# Patient Record
Sex: FEMALE | Race: WHITE | NOT HISPANIC OR LATINO | Employment: OTHER | URBAN - METROPOLITAN AREA
[De-identification: names, ages, dates, MRNs, and addresses within clinical notes are randomized per-mention and may not be internally consistent; named-entity substitution may affect disease eponyms.]

---

## 2018-07-11 ENCOUNTER — HOSPITAL ENCOUNTER (EMERGENCY)
Facility: HOSPITAL | Age: 83
Discharge: HOME | End: 2018-07-11
Attending: EMERGENCY MEDICINE | Admitting: EMERGENCY MEDICINE
Payer: MEDICARE

## 2018-07-11 VITALS
OXYGEN SATURATION: 95 % | BODY MASS INDEX: 23.3 KG/M2 | DIASTOLIC BLOOD PRESSURE: 78 MMHG | WEIGHT: 145 LBS | HEART RATE: 71 BPM | SYSTOLIC BLOOD PRESSURE: 184 MMHG | RESPIRATION RATE: 18 BRPM | HEIGHT: 66 IN | TEMPERATURE: 98 F

## 2018-07-11 DIAGNOSIS — T07.XXXA ABRASIONS OF MULTIPLE SITES: Primary | ICD-10-CM

## 2018-07-11 PROCEDURE — 99284 EMERGENCY DEPT VISIT MOD MDM: CPT

## 2018-07-11 PROCEDURE — 90715 TDAP VACCINE 7 YRS/> IM: CPT | Performed by: EMERGENCY MEDICINE

## 2018-07-11 PROCEDURE — 90471 IMMUNIZATION ADMIN: CPT

## 2018-07-11 RX ORDER — LEVOTHYROXINE SODIUM 0.07 MG/1
75 TABLET ORAL DAILY
COMMUNITY

## 2018-07-11 RX ORDER — FUROSEMIDE 40 MG/1
40 TABLET ORAL DAILY
COMMUNITY

## 2018-07-11 RX ORDER — ASPIRIN 81 MG/1
81 TABLET ORAL DAILY
COMMUNITY

## 2018-07-11 RX ORDER — LISINOPRIL 5 MG/1
5 TABLET ORAL DAILY
COMMUNITY

## 2018-07-11 RX ADMIN — TETANUS TOXOID, REDUCED DIPHTHERIA TOXOID AND ACELLULAR PERTUSSIS VACCINE, ADSORBED 0.5 ML: 5; 2.5; 8; 8; 2.5 SUSPENSION INTRAMUSCULAR at 21:26

## 2018-07-11 NOTE — ED TRIAGE NOTES
Pt arrives to ED covered in urine and dirty clothes  Stating that she would like her cat bite from 4 days ago looked at  Per EMS, pts Son checked on her today after a period of time and found her unkempt appearing to not be able to take care of herself  Pt denies needing any help at home  Pt states uses at wheelchair at home and couldn't get up to go to the bathroom in time because of her lasix  Pt oriented to person and place but needs prompting of the time  Pt also admits that she is falling more at home

## 2018-07-12 NOTE — ED NOTES
Pt cleaned and changed into paper scrubs  Pt refusing any testing, requesting only a tetanus shot  Physician at bedside to speak to family        Panchito Henriquez RN  07/11/18 5595

## 2018-07-12 NOTE — ED PROVIDER NOTES
History  Chief Complaint   Patient presents with    Cat Bite    Failure To Thrive     87 YOF WITH REPORTED CAT BITE TO RIGHT ARM 4 DAYS AGO  HER SON HAD VISITED THE PATIENT TODAY AND NOTICE THE RIGHT FOREARM WOUNDS  NO REPORTED ADDITIONAL TRAUMA  THE PATIENT IS IN A WC AND DOES MOST OF HER HOUSEHOLD ACTIVITIES FROM THE WC  SHE DECLINES HELP AOR HOSPITALIZATION            Prior to Admission Medications   Prescriptions Last Dose Informant Patient Reported? Taking?   aspirin (ECOTRIN LOW STRENGTH) 81 mg EC tablet Unknown at Unknown time  Yes No   Sig: Take 81 mg by mouth daily   furosemide (LASIX) 40 mg tablet   Yes Yes   Sig: Take 40 mg by mouth daily   levothyroxine 75 mcg tablet Unknown at Unknown time  Yes No   Sig: Take 75 mcg by mouth daily   lisinopril (ZESTRIL) 5 mg tablet Unknown at Unknown time  Yes No   Sig: Take 5 mg by mouth daily      Facility-Administered Medications: None       Past Medical History:   Diagnosis Date    CHF (congestive heart failure) (HCC)     Disease of thyroid gland     Hypertension     MI, old        Past Surgical History:   Procedure Laterality Date    APPENDECTOMY      CAROTID ARTERY - SUBCLAVIAN ARTERY BYPASS GRAFT      HYSTERECTOMY      TONSILLECTOMY         History reviewed  No pertinent family history  I have reviewed and agree with the history as documented  Social History   Substance Use Topics    Smoking status: Former Smoker     Types: Cigarettes     Quit date: 1985    Smokeless tobacco: Never Used    Alcohol use No        Review of Systems   Constitutional: Negative for chills and fever  HENT: Negative for ear pain, rhinorrhea and sore throat  Eyes: Negative for pain, redness and visual disturbance  Respiratory: Negative for cough and shortness of breath  Cardiovascular: Negative for chest pain and leg swelling  Gastrointestinal: Negative for abdominal pain, diarrhea, nausea and vomiting     Genitourinary: Negative for dysuria, flank pain, frequency and urgency  Musculoskeletal: Negative for back pain, myalgias and neck pain  Skin: Negative for rash  Neurological: Negative for dizziness, weakness, light-headedness and headaches  Hematological: Negative  Psychiatric/Behavioral: Negative for agitation, confusion and suicidal ideas  The patient is not nervous/anxious  All other systems reviewed and are negative  Physical Exam  Physical Exam   Constitutional: She is oriented to person, place, and time  She appears well-developed and well-nourished  HENT:   Nose: Nose normal    Mouth/Throat: Oropharynx is clear and moist  No oropharyngeal exudate  Eyes: Conjunctivae and EOM are normal  Pupils are equal, round, and reactive to light  No scleral icterus  Neck: Normal range of motion  Neck supple  No JVD present  No tracheal deviation present  Cardiovascular: Normal rate, regular rhythm and normal heart sounds  No murmur heard  Pulmonary/Chest: Effort normal and breath sounds normal  No respiratory distress  She has no wheezes  She has no rales  Abdominal: Soft  Bowel sounds are normal  There is no tenderness  There is no guarding  Musculoskeletal: Normal range of motion  She exhibits no edema or tenderness  Neurological: She is alert and oriented to person, place, and time  No cranial nerve deficit or sensory deficit  She exhibits normal muscle tone  5/5 motor, nl sens   Skin: Skin is warm and dry  THERE ARE SCATTERED SMALL LINEAR CRUSTED LESIONS TO THE RIGHT DORSAL FOREARM  SCATTERED ECCHYMOTIC PATCHES  NO DC OR OOZE  NO SEPSIS OR CELLULITIS   Psychiatric: She has a normal mood and affect  Her behavior is normal    Nursing note and vitals reviewed        Vital Signs  ED Triage Vitals [07/11/18 1912]   Temperature Pulse Respirations Blood Pressure SpO2   98 1 °F (36 7 °C) 69 18 (!) 197/114 96 %      Temp Source Heart Rate Source Patient Position - Orthostatic VS BP Location FiO2 (%)   Temporal Monitor Lying Left arm --      Pain Score       No Pain           Vitals:    07/11/18 1912 07/11/18 1930   BP: (!) 197/114 145/97   Pulse: 69 70   Patient Position - Orthostatic VS: Lying        Visual Acuity      ED Medications  Medications - No data to display    Diagnostic Studies  Results Reviewed     None                 No orders to display              Procedures  Procedures       Phone Contacts  ED Phone Contact    ED Course                               MDM  CritCare Time    Disposition  Final diagnoses:   None     ED Disposition     None      Follow-up Information    None         Patient's Medications   Discharge Prescriptions    No medications on file     No discharge procedures on file      ED Provider  Electronically Signed by           Savita Blackwood MD  07/11/18 9965

## 2018-07-12 NOTE — DISCHARGE INSTRUCTIONS
Abrasion   WHAT YOU NEED TO KNOW:   An abrasion is a scrape on your skin  It happens when your skin rubs against a rough surface  Some examples of an abrasion include rug burn, a skinned elbow, or road rash  Abrasions can be many shapes and sizes  The wound may hurt, bleed, bruise, or swell  DISCHARGE INSTRUCTIONS:   Return to the emergency department if:   · The bleeding does not stop after 10 minutes of firm pressure  · You cannot rinse one or more foreign objects out of your wound  · You have red streaks on your skin coming from your wound  Contact your healthcare provider if:   · You have a fever or chills  · Your abrasion is red, warm, swollen, or draining pus  · You have questions or concerns about your condition or care  Care for your abrasion:   · Wash your hands and dry them with a clean towel  · Press a clean cloth against your wound to stop any bleeding  · Rinse your wound with a lot of clean water  Do not use harsh soap, alcohol, or iodine solutions  · Use a clean, wet cloth to remove any objects, such as small pieces of rocks or dirt  · Rub antibiotic ointment on your wound  This may help prevent infection and help your wound heal     · Cover the wound with a non-stick bandage  Change the bandage daily, and if gets wet or dirty  Follow up with your healthcare provider as directed:  Write down your questions so you remember to ask them during your visits  © 2017 [employer] Information is for End User's use only and may not be sold, redistributed or otherwise used for commercial purposes  All illustrations and images included in CareNotes® are the copyrighted property of [employer] , [employer]  or [employer]  The above information is an  only  It is not intended as medical advice for individual conditions or treatments   Talk to your doctor, nurse or pharmacist before following any medical regimen to see if it is safe and effective for you

## 2018-08-21 ENCOUNTER — HOSPITAL ENCOUNTER (EMERGENCY)
Facility: HOSPITAL | Age: 83
Discharge: HOME | End: 2018-08-21
Attending: EMERGENCY MEDICINE | Admitting: EMERGENCY MEDICINE
Payer: MEDICARE

## 2018-08-21 VITALS
OXYGEN SATURATION: 99 % | SYSTOLIC BLOOD PRESSURE: 199 MMHG | DIASTOLIC BLOOD PRESSURE: 74 MMHG | BODY MASS INDEX: 23.3 KG/M2 | RESPIRATION RATE: 16 BRPM | TEMPERATURE: 97.1 F | WEIGHT: 145 LBS | HEART RATE: 78 BPM | HEIGHT: 66 IN

## 2018-08-21 DIAGNOSIS — I10 HYPERTENSION: ICD-10-CM

## 2018-08-21 DIAGNOSIS — R60.0 PEDAL EDEMA: ICD-10-CM

## 2018-08-21 DIAGNOSIS — R50.9 FEVER: ICD-10-CM

## 2018-08-21 DIAGNOSIS — N81.10 BLADDER PROLAPSE, FEMALE, ACQUIRED: ICD-10-CM

## 2018-08-21 DIAGNOSIS — L97.909 VENOUS STASIS ULCER (HCC): ICD-10-CM

## 2018-08-21 DIAGNOSIS — I83.009 VENOUS STASIS ULCER (HCC): ICD-10-CM

## 2018-08-21 DIAGNOSIS — N28.9 RENAL INSUFFICIENCY: ICD-10-CM

## 2018-08-21 DIAGNOSIS — N39.0 UTI (URINARY TRACT INFECTION): Primary | ICD-10-CM

## 2018-08-21 LAB
ALBUMIN SERPL BCP-MCNC: 4.1 G/DL (ref 3.5–5.7)
ALP SERPL-CCNC: 157 U/L (ref 55–165)
ALT SERPL W P-5'-P-CCNC: 8 U/L (ref 7–52)
ANION GAP SERPL CALCULATED.3IONS-SCNC: 9 MMOL/L (ref 4–13)
APTT PPP: 36 SECONDS (ref 24–36)
AST SERPL W P-5'-P-CCNC: 14 U/L (ref 13–39)
BACTERIA UR QL AUTO: ABNORMAL /HPF
BASOPHILS # BLD AUTO: 0.1 THOUSANDS/ΜL (ref 0–0.1)
BASOPHILS NFR BLD AUTO: 1 % (ref 0–1)
BILIRUB SERPL-MCNC: 0.5 MG/DL (ref 0.2–1)
BILIRUB UR QL STRIP: NEGATIVE
BUN SERPL-MCNC: 31 MG/DL (ref 7–25)
CALCIUM SERPL-MCNC: 9.8 MG/DL (ref 8.6–10.5)
CHLORIDE SERPL-SCNC: 102 MMOL/L (ref 98–107)
CLARITY UR: ABNORMAL
CO2 SERPL-SCNC: 28 MMOL/L (ref 21–31)
COLOR UR: YELLOW
CREAT SERPL-MCNC: 1.25 MG/DL (ref 0.6–1.2)
EOSINOPHIL # BLD AUTO: 0.2 THOUSAND/ΜL (ref 0–0.61)
EOSINOPHIL NFR BLD AUTO: 3 % (ref 0–6)
ERYTHROCYTE [DISTWIDTH] IN BLOOD BY AUTOMATED COUNT: 16.3 % (ref 11.6–15.1)
GFR SERPL CREATININE-BSD FRML MDRD: 39 ML/MIN/1.73SQ M
GLUCOSE SERPL-MCNC: 92 MG/DL (ref 65–140)
GLUCOSE UR STRIP-MCNC: NEGATIVE MG/DL
HCT VFR BLD AUTO: 38.1 % (ref 37–47)
HGB BLD-MCNC: 12.3 G/DL (ref 11.5–15.4)
HGB UR QL STRIP.AUTO: ABNORMAL
INR PPP: 1.09 (ref 0.9–1.5)
KETONES UR STRIP-MCNC: NEGATIVE MG/DL
LACTATE SERPL-SCNC: 1.2 MMOL/L (ref 0.5–2)
LEUKOCYTE ESTERASE UR QL STRIP: ABNORMAL
LYMPHOCYTES # BLD AUTO: 2 THOUSANDS/ΜL (ref 0.6–4.47)
LYMPHOCYTES NFR BLD AUTO: 31 % (ref 14–44)
MCH RBC QN AUTO: 28.7 PG (ref 26.8–34.3)
MCHC RBC AUTO-ENTMCNC: 32.4 G/DL (ref 31.4–37.4)
MCV RBC AUTO: 89 FL (ref 82–98)
MONOCYTES # BLD AUTO: 0.7 THOUSAND/ΜL (ref 0.17–1.22)
MONOCYTES NFR BLD AUTO: 11 % (ref 4–12)
NEUTROPHILS # BLD AUTO: 3.6 THOUSANDS/ΜL (ref 1.85–7.62)
NEUTS SEG NFR BLD AUTO: 55 % (ref 43–75)
NITRITE UR QL STRIP: POSITIVE
NON-SQ EPI CELLS URNS QL MICRO: ABNORMAL /HPF
NRBC BLD AUTO-RTO: 0 /100 WBCS
PH UR STRIP.AUTO: 7 [PH] (ref 5–8)
PLATELET # BLD AUTO: 336 THOUSANDS/UL (ref 149–390)
PMV BLD AUTO: 7.9 FL (ref 8.9–12.7)
POTASSIUM SERPL-SCNC: 4.7 MMOL/L (ref 3.5–5.5)
PROT SERPL-MCNC: 7.6 G/DL (ref 6.4–8.9)
PROT UR STRIP-MCNC: ABNORMAL MG/DL
PROTHROMBIN TIME: 12.5 SECONDS (ref 10.1–12.9)
RBC # BLD AUTO: 4.3 MILLION/UL (ref 3.81–5.12)
RBC #/AREA URNS AUTO: ABNORMAL /HPF
SODIUM SERPL-SCNC: 139 MMOL/L (ref 134–143)
SP GR UR STRIP.AUTO: 1.01 (ref 1–1.03)
UROBILINOGEN UR QL STRIP.AUTO: 0.2 E.U./DL
WBC # BLD AUTO: 6.6 THOUSAND/UL (ref 4.31–10.16)
WBC #/AREA URNS AUTO: ABNORMAL /HPF

## 2018-08-21 PROCEDURE — 85730 THROMBOPLASTIN TIME PARTIAL: CPT | Performed by: EMERGENCY MEDICINE

## 2018-08-21 PROCEDURE — 87077 CULTURE AEROBIC IDENTIFY: CPT | Performed by: EMERGENCY MEDICINE

## 2018-08-21 PROCEDURE — 81001 URINALYSIS AUTO W/SCOPE: CPT | Performed by: EMERGENCY MEDICINE

## 2018-08-21 PROCEDURE — 85025 COMPLETE CBC W/AUTO DIFF WBC: CPT | Performed by: EMERGENCY MEDICINE

## 2018-08-21 PROCEDURE — 87086 URINE CULTURE/COLONY COUNT: CPT | Performed by: EMERGENCY MEDICINE

## 2018-08-21 PROCEDURE — 99283 EMERGENCY DEPT VISIT LOW MDM: CPT

## 2018-08-21 PROCEDURE — 85610 PROTHROMBIN TIME: CPT | Performed by: EMERGENCY MEDICINE

## 2018-08-21 PROCEDURE — 36415 COLL VENOUS BLD VENIPUNCTURE: CPT | Performed by: EMERGENCY MEDICINE

## 2018-08-21 PROCEDURE — 87040 BLOOD CULTURE FOR BACTERIA: CPT | Performed by: EMERGENCY MEDICINE

## 2018-08-21 PROCEDURE — 80053 COMPREHEN METABOLIC PANEL: CPT | Performed by: EMERGENCY MEDICINE

## 2018-08-21 PROCEDURE — 83605 ASSAY OF LACTIC ACID: CPT | Performed by: EMERGENCY MEDICINE

## 2018-08-21 PROCEDURE — 93005 ELECTROCARDIOGRAM TRACING: CPT

## 2018-08-21 PROCEDURE — 87186 SC STD MICRODIL/AGAR DIL: CPT | Performed by: EMERGENCY MEDICINE

## 2018-08-21 RX ORDER — CLONIDINE HYDROCHLORIDE 0.1 MG/1
0.1 TABLET ORAL ONCE
Status: COMPLETED | OUTPATIENT
Start: 2018-08-21 | End: 2018-08-21

## 2018-08-21 RX ORDER — CEPHALEXIN 500 MG/1
500 CAPSULE ORAL ONCE
Status: COMPLETED | OUTPATIENT
Start: 2018-08-21 | End: 2018-08-21

## 2018-08-21 RX ORDER — CEPHALEXIN 500 MG/1
500 CAPSULE ORAL EVERY 6 HOURS SCHEDULED
Qty: 28 CAPSULE | Refills: 0 | Status: SHIPPED | OUTPATIENT
Start: 2018-08-21 | End: 2018-08-25

## 2018-08-21 RX ADMIN — CLONIDINE HYDROCHLORIDE 0.1 MG: 0.1 TABLET ORAL at 21:26

## 2018-08-21 RX ADMIN — CEPHALEXIN 500 MG: 500 CAPSULE ORAL at 23:16

## 2018-08-21 RX ADMIN — CLONIDINE HYDROCHLORIDE 0.1 MG: 0.1 TABLET ORAL at 22:24

## 2018-08-22 LAB
ATRIAL RATE: 90 BPM
P AXIS: 84 DEGREES
PR INTERVAL: 164 MS
QRS AXIS: 26 DEGREES
QRSD INTERVAL: 96 MS
QT INTERVAL: 368 MS
QTC INTERVAL: 450 MS
T WAVE AXIS: -24 DEGREES
VENTRICULAR RATE: 90 BPM
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
## 2018-08-22 NOTE — DISCHARGE INSTRUCTIONS
Urinary Traction Infection in Older Adults   AMBULATORY CARE:   A urinary tract infection  (UTI) is caused by bacteria that get inside your urinary tract  Your urinary tract includes your kidneys, ureters, bladder, and urethra  Urine is made in your kidneys, and it flows from the ureters to the bladder  Urine leaves the bladder through the urethra  A UTI is more common in your lower urinary tract, which includes your bladder and urethra  Common signs and symptoms include the following:   · Fever and chills    · Pain or burning when you urinate    · Urine that smells bad or looks cloudy, or blood in your urine    · Urinating more often or waking from sleep to urinate    · Sudden, strong need to urinate    · Pain or pressure in your lower abdomen     · Leaking urine    · Confusion or agitation    · Fatigue, shakiness, and weakness  Seek care immediately if:   · You are urinating very little or not at all  · You are vomiting  · You have a high fever with shaking chills  · You have side or back pain that gets worse  Contact your healthcare provider if:   · You have a fever  · You are a woman and you have increased white or yellow discharge from your vagina  · You do not feel better after 2 days of taking antibiotics  · You have questions or concerns about your condition or care  Treatment:  Medicines treat the bacterial infection or decrease pain and burning when you urinate  You may also need medicines to decrease the urge to urinate often  Your healthcare provider may recommend cranberry juice or cranberry supplements to help decrease your symptoms  Self-care:   · Urinate when you feel the urge  Do not hold your urine because bacteria can grow in the bladder if urine stays in the bladder too long  It may be helpful to urinate at least every 3 to 4 hours  · Drink liquids as directed  Liquids can help flush bacteria from your urinary tract   Ask how much liquid to drink each day and which liquids are best for you  You may need to drink more liquids than usual to help flush out the bacteria  Do not drink alcohol, caffeine, and citrus juices  These can irritate your bladder and increase your symptoms  · Apply heat  on your abdomen for 20 to 30 minutes every 2 hours for as many days as directed  Heat helps decrease discomfort and pressure in your bladder  Prevent a UTI:   · Women should wipe front to back  after urinating or having a bowel movement  This may prevent germs from getting into the urinary tract  · Urinate after you have sex  to flush away bacteria that can enter your urinary tract during sex  · Wear cotton underwear and clothes that fit loose  Tight pants and nylon underwear can trap moisture and cause bacteria to grow  Follow up with your healthcare provider as directed:  Write down your questions so you remember to ask them during your visits  © 2017 [facility] Information is for End User's use only and may not be sold, redistributed or otherwise used for commercial purposes  All illustrations and images included in CareNotes® are the copyrighted property of A D A M , Inc  or Aiden Rios  The above information is an  only  It is not intended as medical advice for individual conditions or treatments  Talk to your doctor, nurse or pharmacist before following any medical regimen to see if it is safe and effective for you  Cystocele   WHAT YOU NEED TO KNOW:   A cystocele is a condition where a part of your bladder falls into your vagina because of weakened or stretched pelvic muscles  In some cases your bladder may begin to slip through your vaginal opening  DISCHARGE INSTRUCTIONS:   Follow up with your healthcare provider or gynecologist as directed:  Write down your questions so you remember to ask them during your visits  Keep a diary:  Record the number of times you urinate each day   Describe the color and amount of your urine  Bring the diary to your follow-up visits  Manage your symptoms:   · Do Kegel exercises regularly: These exercises can help your pelvic floor muscles get stronger  Ask your healthcare provider for more information about Kegel exercises  Tighten the muscles of your pelvis (the muscles you use to stop urinating)  Hold the muscles tight for 5 seconds, then relax for 5 seconds  Gradually work up to hold the muscles contracted for 10 seconds  Do at least 3 sets of 10 repetitions a day  · Avoid straining:  Do not lift heavy objects, stand for long periods of time, or strain to have a bowel movement  Prevent constipation by drinking plenty of liquids and eating foods high in fiber  Ask how much liquid you should drink every day  High fiber foods include fresh fruits, vegetables, and whole grains  · Maintain a healthy weight:  Ask your healthcare provider if you are at a healthy weight and what is the best exercise program for you  Try to exercise at least 30 minutes every day  Exercise can also help prevent constipation  Contact your healthcare provider or gynecologist if:   · You have a fever  · You have chills or feel weak and achy  · You have lower abdominal pain or back pain that does not go away  · You cannot urinate  · You have questions about your condition or care  Return to the emergency department if:   · You have abnormal bleeding from your vagina  · You have a mass coming out of your vagina that you cannot push back in     · You have severe lower abdominal pain  · You have a bad-smelling discharge coming from your vagina  © 2017 [address] [employer] Information is for End User's use only and may not be sold, redistributed or otherwise used for commercial purposes  All illustrations and images included in CareNotes® are the copyrighted property of A D A [employer]  or Aiden Rios  The above information is an  only   It is not intended as medical advice for individual conditions or treatments  Talk to your doctor, nurse or pharmacist before following any medical regimen to see if it is safe and effective for you  Chronic Hypertension   WHAT YOU NEED TO KNOW:   Hypertension is high blood pressure (BP)  Your BP is the force of your blood moving against the walls of your arteries  Normal BP is less than 120/80  Prehypertension is between 120/80 and 139/89  Hypertension is 140/90 or higher  Hypertension causes your BP to get so high that your heart has to work much harder than normal  This can damage your heart  Chronic hypertension is a long-term condition that you can control with a healthy lifestyle or medicines  A controlled blood pressure helps protect your organs, such as your heart, lungs, brain, and kidneys  DISCHARGE INSTRUCTIONS:   Call 911 for any of the following:   · You have discomfort in your chest that feels like squeezing, pressure, fullness, or pain  · You become confused or have difficulty speaking  · You suddenly feel lightheaded or have trouble breathing  · You have pain or discomfort in your back, neck, jaw, stomach, or arm  Return to the emergency department if:   · You have a severe headache or vision loss  · You have weakness in an arm or leg  Contact your healthcare provider if:   · You feel faint, dizzy, confused, or drowsy  · You have been taking your BP medicine and your BP is still higher than your healthcare provider says it should be  · You have questions or concerns about your condition or care  Medicines: You may need any of the following:  · Medicine  may be used to help lower your BP  You may need more than one type of medicine  Take the medicine exactly as directed  · Diuretics  help decrease extra fluid that collects in your body  This will help lower your BP  You may urinate more often while you take this medicine  · Cholesterol medicine  helps lower your cholesterol level   A low cholesterol level helps prevent heart disease and makes it easier to control your blood pressure  · Take your medicine as directed  Contact your healthcare provider if you think your medicine is not helping or if you have side effects  Tell him or her if you are allergic to any medicine  Keep a list of the medicines, vitamins, and herbs you take  Include the amounts, and when and why you take them  Bring the list or the pill bottles to follow-up visits  Carry your medicine list with you in case of an emergency  Follow up with your healthcare provider as directed: You will need to return to have your blood pressure checked and to have other lab tests done  Write down your questions so you remember to ask them during your visits  Manage chronic hypertension:  Talk with your healthcare provider about these and other ways to manage hypertension:  · Take your BP at home  Sit and rest for 5 minutes before you take your BP  Extend your arm and support it on a flat surface  Your arm should be at the same level as your heart  Follow the directions that came with your BP monitor  If possible, take at least 2 BP readings each time  Take your BP at least twice a day at the same times each day, such as morning and evening  Keep a record of your BP readings and bring it to your follow-up visits  Ask your healthcare provider what your blood pressure should be  · Limit sodium (salt) as directed  Too much sodium can affect your fluid balance  Check labels to find low-sodium or no-salt-added foods  Some low-sodium foods use potassium salts for flavor  Too much potassium can also cause health problems  Your healthcare provider will tell you how much sodium and potassium are safe for you to have in a day  He or she may recommend that you limit sodium to 2,300 mg a day  · Follow the meal plan recommended by your healthcare provider    A dietitian or your provider can give you more information on low-sodium plans or the DASH (Dietary Approaches to Stop Hypertension) eating plan  The DASH plan is low in sodium, unhealthy fats, and total fat  It is high in potassium, calcium, and fiber  · Exercise to maintain a healthy weight  Exercise at least 30 minutes per day, on most days of the week  This will help decrease your blood pressure  Ask about the best exercise plan for you  · Decrease stress  This may help lower your BP  Learn ways to relax, such as deep breathing or listening to music  · Limit alcohol  Women should limit alcohol to 1 drink a day  Men should limit alcohol to 2 drinks a day  A drink of alcohol is 12 ounces of beer, 5 ounces of wine, or 1½ ounces of liquor  · Do not smoke  Nicotine and other chemicals in cigarettes and cigars can increase your BP and also cause lung damage  Ask your healthcare provider for information if you currently smoke and need help to quit  E-cigarettes or smokeless tobacco still contain nicotine  Talk to your healthcare provider before you use these products  © 2017 [address] [facility] Information is for End User's use only and may not be sold, redistributed or otherwise used for commercial purposes  All illustrations and images included in CareNotes® are the copyrighted property of A D A M , Inc  or Aiden Rios  The above information is an  only  It is not intended as medical advice for individual conditions or treatments  Talk to your doctor, nurse or pharmacist before following any medical regimen to see if it is safe and effective for you

## 2018-08-22 NOTE — ED RE-EVALUATION NOTE
Patient's pharmacy called and patient has cephalexin prescribed but patient has an allergy to cephalosporins  I changed prescription to Bactrim DS BID for ten days       Christine Lazo,   08/22/18 5961

## 2018-08-24 LAB
ATRIAL RATE: 90 BPM
BACTERIA UR CULT: ABNORMAL
P AXIS: 84 DEGREES
PR INTERVAL: 164 MS
QRS AXIS: 26 DEGREES
QRSD INTERVAL: 96 MS
QT INTERVAL: 368 MS
QTC INTERVAL: 450 MS
T WAVE AXIS: -24 DEGREES
VENTRICULAR RATE: 90 BPM

## 2018-08-25 ENCOUNTER — HOSPITAL ENCOUNTER (EMERGENCY)
Facility: HOSPITAL | Age: 83
DRG: 689 | End: 2018-08-25
Attending: EMERGENCY MEDICINE | Admitting: EMERGENCY MEDICINE
Payer: MEDICARE

## 2018-08-25 ENCOUNTER — APPOINTMENT (EMERGENCY)
Dept: RADIOLOGY | Facility: HOSPITAL | Age: 83
DRG: 689 | End: 2018-08-25
Payer: MEDICARE

## 2018-08-25 ENCOUNTER — HOSPITAL ENCOUNTER (INPATIENT)
Facility: HOSPITAL | Age: 83
LOS: 3 days | Discharge: NURSING, REHAB OR LONG-TERM CARE | DRG: 689 | End: 2018-08-28
Attending: HOSPITALIST | Admitting: HOSPITALIST
Payer: MEDICARE

## 2018-08-25 ENCOUNTER — APPOINTMENT (EMERGENCY)
Dept: CT IMAGING | Facility: HOSPITAL | Age: 83
DRG: 689 | End: 2018-08-25
Payer: MEDICARE

## 2018-08-25 VITALS
SYSTOLIC BLOOD PRESSURE: 132 MMHG | DIASTOLIC BLOOD PRESSURE: 62 MMHG | BODY MASS INDEX: 23.3 KG/M2 | HEART RATE: 88 BPM | HEIGHT: 66 IN | TEMPERATURE: 99.1 F | RESPIRATION RATE: 21 BRPM | WEIGHT: 145 LBS

## 2018-08-25 DIAGNOSIS — R77.8 ELEVATED TROPONIN: ICD-10-CM

## 2018-08-25 DIAGNOSIS — L89.42 PRESSURE ULCER OF CONTIGUOUS REGION INVOLVING BACK AND LEFT BUTTOCK, STAGE 2 (HCC): Primary | ICD-10-CM

## 2018-08-25 DIAGNOSIS — N39.0 URINARY TRACT INFECTION: ICD-10-CM

## 2018-08-25 DIAGNOSIS — M62.82 RHABDOMYOLYSIS: Primary | ICD-10-CM

## 2018-08-25 DIAGNOSIS — N18.30 CKD (CHRONIC KIDNEY DISEASE) STAGE 3, GFR 30-59 ML/MIN (HCC): Chronic | ICD-10-CM

## 2018-08-25 PROBLEM — E03.4 HYPOTHYROIDISM DUE TO ACQUIRED ATROPHY OF THYROID: Chronic | Status: ACTIVE | Noted: 2018-08-25

## 2018-08-25 PROBLEM — N30.00 ACUTE CYSTITIS WITHOUT HEMATURIA: Status: ACTIVE | Noted: 2018-08-25

## 2018-08-25 PROBLEM — G92.9 TOXIC ENCEPHALOPATHY: Status: ACTIVE | Noted: 2018-08-25

## 2018-08-25 LAB
ALBUMIN SERPL BCP-MCNC: 3.7 G/DL (ref 3.5–5.7)
ALP SERPL-CCNC: 145 U/L (ref 55–165)
ALT SERPL W P-5'-P-CCNC: 19 U/L (ref 7–52)
ANION GAP SERPL CALCULATED.3IONS-SCNC: 13 MMOL/L (ref 4–13)
ANION GAP SERPL CALCULATED.3IONS-SCNC: 9 MMOL/L (ref 4–13)
AST SERPL W P-5'-P-CCNC: 51 U/L (ref 13–39)
ATRIAL RATE: 93 BPM
ATRIAL RATE: 95 BPM
BACTERIA UR QL AUTO: ABNORMAL /HPF
BASOPHILS # BLD AUTO: 0 THOUSANDS/ΜL (ref 0–0.1)
BASOPHILS NFR BLD AUTO: 0 % (ref 0–1)
BILIRUB SERPL-MCNC: 0.7 MG/DL (ref 0.2–1)
BILIRUB UR QL STRIP: NEGATIVE
BUN SERPL-MCNC: 30 MG/DL (ref 7–25)
BUN SERPL-MCNC: 31 MG/DL (ref 7–25)
CALCIUM SERPL-MCNC: 9.2 MG/DL (ref 8.6–10.5)
CALCIUM SERPL-MCNC: 9.6 MG/DL (ref 8.6–10.5)
CHLORIDE SERPL-SCNC: 103 MMOL/L (ref 98–107)
CHLORIDE SERPL-SCNC: 105 MMOL/L (ref 98–107)
CK MB SERPL-MCNC: 2.2 % (ref 0–2.5)
CK MB SERPL-MCNC: 28.7 NG/ML (ref 0.6–6.3)
CK SERPL-CCNC: 1276 U/L (ref 30–192)
CLARITY UR: ABNORMAL
CO2 SERPL-SCNC: 24 MMOL/L (ref 21–31)
CO2 SERPL-SCNC: 27 MMOL/L (ref 21–31)
COLOR UR: YELLOW
CREAT SERPL-MCNC: 1.15 MG/DL (ref 0.6–1.2)
CREAT SERPL-MCNC: 1.15 MG/DL (ref 0.6–1.2)
EOSINOPHIL # BLD AUTO: 0 THOUSAND/ΜL (ref 0–0.61)
EOSINOPHIL NFR BLD AUTO: 0 % (ref 0–6)
ERYTHROCYTE [DISTWIDTH] IN BLOOD BY AUTOMATED COUNT: 15.9 % (ref 11.6–15.1)
ERYTHROCYTE [DISTWIDTH] IN BLOOD BY AUTOMATED COUNT: 16 % (ref 11.5–14.5)
GFR SERPL CREATININE-BSD FRML MDRD: 43 ML/MIN/1.73SQ M
GFR SERPL CREATININE-BSD FRML MDRD: 43 ML/MIN/1.73SQ M
GLUCOSE SERPL-MCNC: 109 MG/DL (ref 65–140)
GLUCOSE SERPL-MCNC: 142 MG/DL (ref 65–99)
GLUCOSE UR STRIP-MCNC: NEGATIVE MG/DL
HCT VFR BLD AUTO: 32.7 % (ref 34.8–46.1)
HCT VFR BLD AUTO: 36.8 % (ref 37–47)
HGB BLD-MCNC: 10.9 G/DL (ref 12–16)
HGB BLD-MCNC: 12.1 G/DL (ref 11.5–15.4)
HGB UR QL STRIP.AUTO: ABNORMAL
KETONES UR STRIP-MCNC: ABNORMAL MG/DL
LEUKOCYTE ESTERASE UR QL STRIP: ABNORMAL
LYMPHOCYTES # BLD AUTO: 0.8 THOUSANDS/ΜL (ref 0.6–4.47)
LYMPHOCYTES NFR BLD AUTO: 8 % (ref 14–44)
MAGNESIUM SERPL-MCNC: 2.3 MG/DL (ref 1.9–2.7)
MCH RBC QN AUTO: 29 PG (ref 26–34)
MCH RBC QN AUTO: 29.1 PG (ref 26.8–34.3)
MCHC RBC AUTO-ENTMCNC: 32.8 G/DL (ref 31.4–37.4)
MCHC RBC AUTO-ENTMCNC: 33.3 G/DL (ref 31–37)
MCV RBC AUTO: 87 FL (ref 81–99)
MCV RBC AUTO: 89 FL (ref 82–98)
MONOCYTES # BLD AUTO: 1 THOUSAND/ΜL (ref 0.17–1.22)
MONOCYTES NFR BLD AUTO: 10 % (ref 4–12)
NEUTROPHILS # BLD AUTO: 8 THOUSANDS/ΜL (ref 1.85–7.62)
NEUTS SEG NFR BLD AUTO: 82 % (ref 43–75)
NITRITE UR QL STRIP: POSITIVE
NON-SQ EPI CELLS URNS QL MICRO: ABNORMAL /HPF
NRBC BLD AUTO-RTO: 0 /100 WBCS
P AXIS: 114 DEGREES
P AXIS: 88 DEGREES
PH UR STRIP.AUTO: 6.5 [PH] (ref 5–8)
PLATELET # BLD AUTO: 278 THOUSANDS/UL (ref 149–390)
PLATELET # BLD AUTO: 316 THOUSANDS/UL (ref 149–390)
PMV BLD AUTO: 7.9 FL (ref 8.9–12.7)
PMV BLD AUTO: 8.2 FL (ref 8.6–11.7)
POTASSIUM SERPL-SCNC: 4.3 MMOL/L (ref 3.5–5.5)
POTASSIUM SERPL-SCNC: 4.4 MMOL/L (ref 3.5–5.5)
PR INTERVAL: 146 MS
PR INTERVAL: 174 MS
PROT SERPL-MCNC: 7.3 G/DL (ref 6.4–8.9)
PROT UR STRIP-MCNC: ABNORMAL MG/DL
QRS AXIS: 48 DEGREES
QRS AXIS: 51 DEGREES
QRSD INTERVAL: 82 MS
QRSD INTERVAL: 86 MS
QT INTERVAL: 348 MS
QT INTERVAL: 376 MS
QTC INTERVAL: 437 MS
QTC INTERVAL: 467 MS
RBC # BLD AUTO: 3.76 MILLION/UL (ref 3.9–5.2)
RBC # BLD AUTO: 4.14 MILLION/UL (ref 3.81–5.12)
RBC #/AREA URNS AUTO: ABNORMAL /HPF
SODIUM SERPL-SCNC: 140 MMOL/L (ref 134–143)
SODIUM SERPL-SCNC: 141 MMOL/L (ref 134–143)
SP GR UR STRIP.AUTO: 1.02 (ref 1–1.03)
T WAVE AXIS: 243 DEGREES
T WAVE AXIS: 255 DEGREES
TROPONIN I SERPL-MCNC: 0.15 NG/ML
TROPONIN I SERPL-MCNC: 0.15 NG/ML
TROPONIN I SERPL-MCNC: 0.19 NG/ML
UROBILINOGEN UR QL STRIP.AUTO: 0.2 E.U./DL
VENTRICULAR RATE: 93 BPM
VENTRICULAR RATE: 95 BPM
WBC # BLD AUTO: 8.1 THOUSAND/UL (ref 4.8–10.8)
WBC # BLD AUTO: 9.8 THOUSAND/UL (ref 4.31–10.16)
WBC #/AREA URNS AUTO: ABNORMAL /HPF

## 2018-08-25 PROCEDURE — 73030 X-RAY EXAM OF SHOULDER: CPT

## 2018-08-25 PROCEDURE — 82550 ASSAY OF CK (CPK): CPT | Performed by: EMERGENCY MEDICINE

## 2018-08-25 PROCEDURE — 71045 X-RAY EXAM CHEST 1 VIEW: CPT

## 2018-08-25 PROCEDURE — 73502 X-RAY EXAM HIP UNI 2-3 VIEWS: CPT

## 2018-08-25 PROCEDURE — 36415 COLL VENOUS BLD VENIPUNCTURE: CPT | Performed by: EMERGENCY MEDICINE

## 2018-08-25 PROCEDURE — 83735 ASSAY OF MAGNESIUM: CPT | Performed by: EMERGENCY MEDICINE

## 2018-08-25 PROCEDURE — G8987 SELF CARE CURRENT STATUS: HCPCS

## 2018-08-25 PROCEDURE — 87086 URINE CULTURE/COLONY COUNT: CPT | Performed by: EMERGENCY MEDICINE

## 2018-08-25 PROCEDURE — 97165 OT EVAL LOW COMPLEX 30 MIN: CPT

## 2018-08-25 PROCEDURE — 85027 COMPLETE CBC AUTOMATED: CPT | Performed by: PHYSICIAN ASSISTANT

## 2018-08-25 PROCEDURE — G8979 MOBILITY GOAL STATUS: HCPCS

## 2018-08-25 PROCEDURE — 87205 SMEAR GRAM STAIN: CPT | Performed by: FAMILY MEDICINE

## 2018-08-25 PROCEDURE — G8978 MOBILITY CURRENT STATUS: HCPCS

## 2018-08-25 PROCEDURE — 87070 CULTURE OTHR SPECIMN AEROBIC: CPT | Performed by: FAMILY MEDICINE

## 2018-08-25 PROCEDURE — 93005 ELECTROCARDIOGRAM TRACING: CPT

## 2018-08-25 PROCEDURE — 82553 CREATINE MB FRACTION: CPT | Performed by: EMERGENCY MEDICINE

## 2018-08-25 PROCEDURE — 97163 PT EVAL HIGH COMPLEX 45 MIN: CPT

## 2018-08-25 PROCEDURE — 87186 SC STD MICRODIL/AGAR DIL: CPT | Performed by: EMERGENCY MEDICINE

## 2018-08-25 PROCEDURE — 99223 1ST HOSP IP/OBS HIGH 75: CPT | Performed by: HOSPITALIST

## 2018-08-25 PROCEDURE — 84484 ASSAY OF TROPONIN QUANT: CPT | Performed by: EMERGENCY MEDICINE

## 2018-08-25 PROCEDURE — 80053 COMPREHEN METABOLIC PANEL: CPT | Performed by: EMERGENCY MEDICINE

## 2018-08-25 PROCEDURE — 99285 EMERGENCY DEPT VISIT HI MDM: CPT

## 2018-08-25 PROCEDURE — 84484 ASSAY OF TROPONIN QUANT: CPT | Performed by: PHYSICIAN ASSISTANT

## 2018-08-25 PROCEDURE — 80048 BASIC METABOLIC PNL TOTAL CA: CPT | Performed by: PHYSICIAN ASSISTANT

## 2018-08-25 PROCEDURE — 85025 COMPLETE CBC W/AUTO DIFF WBC: CPT | Performed by: EMERGENCY MEDICINE

## 2018-08-25 PROCEDURE — 87077 CULTURE AEROBIC IDENTIFY: CPT | Performed by: EMERGENCY MEDICINE

## 2018-08-25 PROCEDURE — 81001 URINALYSIS AUTO W/SCOPE: CPT | Performed by: EMERGENCY MEDICINE

## 2018-08-25 PROCEDURE — 70450 CT HEAD/BRAIN W/O DYE: CPT

## 2018-08-25 RX ORDER — ASPIRIN 81 MG/1
81 TABLET ORAL DAILY
Status: DISCONTINUED | OUTPATIENT
Start: 2018-08-25 | End: 2018-08-28 | Stop reason: HOSPADM

## 2018-08-25 RX ORDER — HEPARIN SODIUM 5000 [USP'U]/ML
5000 INJECTION, SOLUTION INTRAVENOUS; SUBCUTANEOUS EVERY 8 HOURS SCHEDULED
Status: DISCONTINUED | OUTPATIENT
Start: 2018-08-25 | End: 2018-08-28 | Stop reason: HOSPADM

## 2018-08-25 RX ORDER — NITROFURANTOIN 25; 75 MG/1; MG/1
100 CAPSULE ORAL 2 TIMES DAILY
COMMUNITY
End: 2019-07-30 | Stop reason: HOSPADM

## 2018-08-25 RX ORDER — AMMONIUM LACTATE 12 G/100G
CREAM TOPICAL 2 TIMES DAILY PRN
Status: DISCONTINUED | OUTPATIENT
Start: 2018-08-25 | End: 2018-08-25

## 2018-08-25 RX ORDER — LEVOFLOXACIN 5 MG/ML
500 INJECTION, SOLUTION INTRAVENOUS
Status: DISCONTINUED | OUTPATIENT
Start: 2018-08-25 | End: 2018-08-28

## 2018-08-25 RX ORDER — CLOTRIMAZOLE 1 %
CREAM (GRAM) TOPICAL 2 TIMES DAILY
Status: DISCONTINUED | OUTPATIENT
Start: 2018-08-25 | End: 2018-08-28 | Stop reason: HOSPADM

## 2018-08-25 RX ORDER — SODIUM CHLORIDE 9 MG/ML
100 INJECTION, SOLUTION INTRAVENOUS CONTINUOUS
Status: DISCONTINUED | OUTPATIENT
Start: 2018-08-25 | End: 2018-08-26

## 2018-08-25 RX ORDER — LISINOPRIL 5 MG/1
5 TABLET ORAL DAILY
Status: DISCONTINUED | OUTPATIENT
Start: 2018-08-25 | End: 2018-08-28

## 2018-08-25 RX ORDER — AMMONIUM LACTATE 12 G/100G
CREAM TOPICAL 2 TIMES DAILY
Status: DISCONTINUED | OUTPATIENT
Start: 2018-08-25 | End: 2018-08-25

## 2018-08-25 RX ORDER — LEVOTHYROXINE SODIUM 0.07 MG/1
75 TABLET ORAL
Status: DISCONTINUED | OUTPATIENT
Start: 2018-08-25 | End: 2018-08-28 | Stop reason: HOSPADM

## 2018-08-25 RX ORDER — AMMONIUM LACTATE 12 G/100G
LOTION TOPICAL 2 TIMES DAILY
Status: DISCONTINUED | OUTPATIENT
Start: 2018-08-25 | End: 2018-08-28 | Stop reason: HOSPADM

## 2018-08-25 RX ADMIN — SODIUM CHLORIDE 100 ML/HR: 9 INJECTION, SOLUTION INTRAVENOUS at 22:21

## 2018-08-25 RX ADMIN — LEVOTHYROXINE SODIUM 75 MCG: 75 TABLET ORAL at 08:50

## 2018-08-25 RX ADMIN — LEVOFLOXACIN 500 MG: 5 INJECTION, SOLUTION INTRAVENOUS at 05:30

## 2018-08-25 RX ADMIN — LISINOPRIL 5 MG: 5 TABLET ORAL at 08:50

## 2018-08-25 RX ADMIN — CLOTRIMAZOLE: 10 CREAM TOPICAL at 17:07

## 2018-08-25 RX ADMIN — HEPARIN SODIUM 5000 UNITS: 5000 INJECTION INTRAVENOUS; SUBCUTANEOUS at 22:21

## 2018-08-25 RX ADMIN — Medication: at 18:16

## 2018-08-25 RX ADMIN — SODIUM CHLORIDE 100 ML/HR: 9 INJECTION, SOLUTION INTRAVENOUS at 05:23

## 2018-08-25 RX ADMIN — HEPARIN SODIUM 5000 UNITS: 5000 INJECTION INTRAVENOUS; SUBCUTANEOUS at 05:23

## 2018-08-25 RX ADMIN — HEPARIN SODIUM 5000 UNITS: 5000 INJECTION INTRAVENOUS; SUBCUTANEOUS at 13:44

## 2018-08-25 RX ADMIN — ASPIRIN 81 MG: 81 TABLET, COATED ORAL at 08:50

## 2018-08-25 RX ADMIN — SILVER SULFADIAZINE: 10 CREAM TOPICAL at 17:07

## 2018-08-25 RX ADMIN — CLOTRIMAZOLE: 10 CREAM TOPICAL at 12:11

## 2018-08-25 NOTE — ED PROVIDER NOTES
History  Chief Complaint   Patient presents with    Altered Mental Status     fell today, found by ems 5-6 hours later in feces, confused, shaky     80YEAR-OLD FEMALE WITH A HISTORY OF CONGESTIVE HEART FAILURE OR MI AND HYPERTENSION PRESENTS TO THE EMERGENCY DEPARTMENT WITH HER SON AND DAUGHTER-IN-LAW AFTER APPARENTLY FALLING ON A NUMBER OF OCCASIONS TODAY INJURING HER LEFT SHOULDER AND HER LEFT HIP  APPARENTLY SHE WAS FOUND BY THE POLICE AT HOME AFTER THERE WAS NO RESPONSE FROM PHONE CALLS THAT THE FAMILY HAD MADE TO HER  SHE WAS FOUND LYING ON FLOOR WITH FECAL CONTAMINATION UNABLE TO GET UP  THE FAMILY HAS REPORTED THAT SHE SEEMS INCREASINGLY CONFUSED TODAY AND SEEMS TO BE HALLUCINATING WITH VISIONS OF FLYING CATS  THIS WOMAN HAS HAD A NUMBER OF FALLS OVER THE RECENT PAST, BUT HAS REFUSED ANY TYPE OF HELP EITHER AT HOME OR IN TERMS OF PLACEMENT  AT THIS TIME THE FAMILY IS INTERESTED IN HAVING THE PATIENT AT THE VERY LEAST PLACED IF NOT ADMITTED DEPENDING ON WHETHER MAYBE MEDICAL JUSTIFICATION FOR ADMISSION OR NOT  PATIENT IS UNABLE TO CARE FOR HERSELF SHE HAS LIMITED MOBILITY SECONDARY TO ARTHRITIC AND JOINT ISSUES ALSO BECAUSE OF BALANCE ISSUES IN ADDITION TO THAT SHE HAS BECOME INCREASINGLY CONFUSED AND OFF TIMES IS DIFFICULT TO COMMUNICATE WITH  Cannot display prior to admission medications because the patient has not been admitted in this contact  Past Medical History:   Diagnosis Date    CHF (congestive heart failure) (HCC)     Disease of thyroid gland     Hypertension     MI, old        Past Surgical History:   Procedure Laterality Date    APPENDECTOMY      CAROTID ARTERY - SUBCLAVIAN ARTERY BYPASS GRAFT      HYSTERECTOMY      TONSILLECTOMY         No family history on file  I have reviewed and agree with the history as documented      Social History   Substance Use Topics    Smoking status: Former Smoker     Types: Cigarettes     Quit date: 1985    Smokeless tobacco: Never Used    Alcohol use Not on file        Review of Systems    Physical Exam  Physical Exam    Vital Signs  ED Triage Vitals [08/25/18 0021]   Temperature Pulse Respirations Blood Pressure SpO2   98 5 °F (36 9 °C) 99 18 115/96 --      Temp src Heart Rate Source Patient Position - Orthostatic VS BP Location FiO2 (%)   -- -- -- -- --      Pain Score       --           Vitals:    08/25/18 0021   BP: 115/96   Pulse: 99       Visual Acuity      ED Medications  Medications - No data to display    Diagnostic Studies  Results Reviewed     Procedure Component Value Units Date/Time    CBC and differential [[id number]]     Lab Status:  No result Specimen:  Blood     Comprehensive metabolic panel [[id number]]     Lab Status:  No result Specimen:  Blood     UA w Reflex to Microscopic w Reflex to Culture [[id number]]     Lab Status:  No result Specimen:  Urine     Magnesium [[id number]]     Lab Status:  No result Specimen:  Blood     Troponin I [[id number]]     Lab Status:  No result Specimen:  Blood     CK (with reflex to MB) [[id number]]     Lab Status:  No result Specimen:  Blood                  XR chest 2 views    (Results Pending)   CT head without contrast    (Results Pending)   XR shoulder 2+ views LEFT    (Results Pending)   XR hip/pelv 2-3 vws left if performed    (Results Pending)              Procedures  Procedures       Phone Contacts  ED Phone Contact    ED Course                               MDM  Number of Diagnoses or Management Options  Rhabdomyolysis:   Urinary tract infection:   Diagnosis management comments: AT 2:50 A  M  PATIENT'S STUDIES RETURNED WITH A TROPONIN OF 0 19 THE CPK IS 1276 BUN 30 CREATININE IS PENDING THE REST OF THE METABOLIC PANEL IS UNREMARKABLE CBC LIKEWISE IS UNREMARKABLE THE URINE IS CONTAMINATED CONSISTENT WITH A URINARY TRACT INFECTION THE PATIENT NEVERTHELESS REMAINS HEMODYNAMICALLY AND CLINICALLY STABLE THE MATTER CORRECTION THE PATIENT HAS BEEN PRESENTED TO ACCEPTED BY [facility] FOR ADMISSION THE TRANSFER TO AID WITH A DIAGNOSIS OF RHABDOMYOLYSIS AND URINARY TRACT INFECTION    CritCare Time    Disposition  Final diagnoses:   None     ED Disposition     None      Follow-up Information    None         Patient's Medications   Discharge Prescriptions    No medications on file     No discharge procedures on file      ED Provider  Electronically Signed by           Yuliana Loja MD  08/25/18 1596       Yuliana Loja MD  08/25/18 5998       Yuliana Loja MD  08/25/18 9361       Yuliana Loja MD  08/25/18 1640

## 2018-08-25 NOTE — H&P
H&P- Luther Dunham 5/24/1931, 80 y o  female MRN: [id number]    Unit/Bed#: -02 Encounter: [id number]    Primary Care Provider: Baron Agustin DO   Date and time admitted to hospital: 8/25/2018  4:36 AM        * Acute cystitis without hematuria   Assessment & Plan    · Proteus Mirabalis on Ucx 8/21  · Start Levaquin with PCN allergy        Non-traumatic rhabdomyolysis   Assessment & Plan    · IVF  · Monitor CPK          Elevated troponin   Assessment & Plan    · Likely secondary to rhabdo and infection  · Continue to trend and if increases, Cardiology consult will be requested        Toxic encephalopathy   Assessment & Plan    · Secondary to UTI  · Supportive care        Essential hypertension   Assessment & Plan    · BP ok  · Continue meds and monitor        CKD (chronic kidney disease) stage 3, GFR 30-59 ml/min   Assessment & Plan    · Creat stable  · Monitor          Hypothyroidism due to acquired atrophy of thyroid   Assessment & Plan    · Continue levothyroxine        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, stage 2   Assessment & Plan    · Local care  · Frequent position changes            VTE Prophylaxis: Heparin  Code Status: DNR/DNI per discussion with Dr Mauricio Vegas and family will need to be verified, per nurse Son to bring in living will  POLST: POLST is not applicable to this patient  Discussion with family: none present at this time    Anticipated Length of Stay:  Patient will be admitted on an Inpatient basis with an anticipated length of stay of  > 2 midnights  Justification for Hospital Stay: IV antibiotics, IVF, safe d/c planning    Total Time for Visit, including Counseling / Coordination of Care: 45 minutes  Greater than 50% of this total time spent on direct patient counseling and coordination of care      Chief Complaint:   Fall and confusion    History of Present Illness:    Luther Dunham is a 80 y o  female who presents with multiple falls  Patient was seen in ER on 8/21 and diagnosed with UTI, she was noted to have confusion at that time  She has had multiple falls recently,  had injury left shoulder and left hip approximately 1 week ago  Today patient fell and was down for about 6 hours and could not get up  Patient was found down by Police after not answering her phone calls from family  Family reported visual hallucinations of flying cats  Family is concerned about home safety and may rquest placement for safety, limited mobility, confusion  Patient reports didn't eat for 2 days  States she has had multiple bouts diarrhea since she completed antibiotic  She was transferred from [facility] to [facility] for admission  Review of Systems:  Review of Systems   Constitutional: Negative for chills and fever  HENT: Positive for sore throat  Negative for trouble swallowing  Eyes: Negative for discharge  Respiratory: Positive for cough  Negative for shortness of breath and wheezing  Cardiovascular: Positive for leg swelling  Negative for chest pain  Gastrointestinal: Positive for diarrhea  Negative for abdominal pain, nausea and vomiting  Genitourinary: Negative for dysuria  Musculoskeletal: Positive for arthralgias  Negative for back pain  Skin: Positive for wound  Neurological: Positive for dizziness and weakness  Negative for speech difficulty, light-headedness and headaches  Psychiatric/Behavioral: Positive for confusion and hallucinations         Past Medical and Surgical History:   Past Medical History:   Diagnosis Date    Arthritis     [facility]est[facility] failure[facility] ([facility])     CKD (chronic kidney disease) stage 3, GFR 30-59 ml/min 8/25/2018    Disease of thyroid gland     Hypertension     MI, old     TIA (transient ischemic attack)        Past Surgical History:   Procedure Laterality Date    ANKLE FRACTURE SURGERY      APPENDECTOMY      CAROTID ARTERY - SUBCLAVIAN ARTERY BYPASS GRAFT      HYSTERECTOMY  TONSILLECTOMY         Meds/Allergies:  Prior to Admission medications    Medication Sig Start Date End Date Taking? Authorizing Provider   aspirin (ECOTRIN LOW STRENGTH) 81 mg EC tablet Take 81 mg by mouth daily    Historical Provider, MD   furosemide (LASIX) 40 mg tablet Take 40 mg by mouth daily    Historical Provider, MD   levothyroxine 75 mcg tablet Take 75 mcg by mouth daily    Historical Provider, MD   lisinopril (ZESTRIL) 5 mg tablet Take 5 mg by mouth daily    Historical Provider, MD   nitrofurantoin (MACROBID) 100 mg capsule Take 100 mg by mouth 2 (two) times a day    Historical Provider, MD   cephalexin (KEFLEX) 500 mg capsule Take 1 capsule (500 mg total) by mouth every 6 (six) hours for 7 days 8/21/18 8/25/18  DO Clay Perkins Meds: need to be reviewed with family/pharmacy in AM    Allergies: Allergies   Allergen Reactions    Penicillins        Social History:  Marital Status: /Civil Union   Occupation: retired    Patient Pre-hospital Living Situation: home  Patient Pre-hospital Level of Mobility: has wheelchair at home, but walking at home  Patient Pre-hospital Diet Restrictions: none  Substance Use History:     History   Alcohol Use No     History   Smoking Status    Former Smoker    Types: Cigarettes    Quit date: 1985   Smokeless Tobacco    Never Used     History   Drug Use No       Family History:  I have reviewed the patients family history    Physical Exam:   Vitals:   Blood Pressure: 136/68 (08/25/18 0441)  Pulse: 97 (08/25/18 0441)  Temperature: 98 4 °F (36 9 °C) (08/25/18 0441)  Temp Source: Temporal (08/25/18 0441)  Respirations: 20 (08/25/18 0441)  Height: 5' 7" (170 2 cm) (08/25/18 0441)  Weight - Scale: 60 4 kg (133 lb 1 6 oz) (08/25/18 0441)  SpO2: 97 % (08/25/18 0441)    Physical Exam   Constitutional: She appears well-developed and well-nourished  HENT:   Head: Normocephalic and atraumatic     Eyes: Conjunctivae and EOM are normal  Right eye exhibits no discharge  Left eye exhibits no discharge  Neck: Normal range of motion  No tracheal deviation present  Cardiovascular: Normal rate, regular rhythm and normal heart sounds  Exam reveals no gallop and no friction rub  No murmur heard  Pulmonary/Chest: Effort normal  No respiratory distress  She has no wheezes  She has no rales  Decreased breath sounds bilaterally   Abdominal: Soft  Bowel sounds are normal  She exhibits no distension  There is no tenderness  There is no rebound and no guarding  Musculoskeletal: Normal range of motion  She exhibits edema (mild lower extremity)  She exhibits no tenderness or deformity  Neurological:   Awake and alert, speech intact, moving extremities   Skin: Skin is warm and dry  No rash noted  There is erythema (lower extremities with redness R>L)  There is pallor  Psychiatric: She has a normal mood and affect  Nursing note and vitals reviewed  Additional Data:   Lab Results: I have personally reviewed pertinent reports  Results from last 7 days  Lab Units 08/25/18  0107   WBC Thousand/uL 9 80   HEMOGLOBIN g/dL 12 1   HEMATOCRIT % 36 8*   PLATELETS Thousands/uL 316   NEUTROS PCT % 82*   LYMPHS PCT % 8*   MONOS PCT % 10   EOS PCT % 0       Results from last 7 days  Lab Units 08/25/18  0107   SODIUM mmol/L 140   POTASSIUM mmol/L 4 4   CHLORIDE mmol/L 103   CO2 mmol/L 24   BUN mg/dL 30*   CREATININE mg/dL 1 15   CALCIUM mg/dL 9 6   TOTAL PROTEIN g/dL 7 3   BILIRUBIN TOTAL mg/dL 0 70   ALK PHOS U/L 145   ALT U/L 19   AST U/L 51*   GLUCOSE RANDOM mg/dL 109       Results from last 7 days  Lab Units 08/21/18  2201   INR  1 09               Imaging: I have personally reviewed pertinent reports      CT head:  No evidence of acute intracranial abnormality  AP pelvis and left hip:  No definite acute fracture or dislocation  Left shoulder:  Osteopenia with mild to moderate age-related osteoarthritic changes of the shoulder  Portable chest x-ray:  Cardiomegaly with slightly increased interstitial markings bilaterally which may represent viral upper respiratory infection or mild pulmonary edema    EKG, Pathology, and Other Studies Reviewed on Admission:   · EKG: not available to review    [facility] Reviewed: Yes     ** Please Note: This note has been constructed using a voice recognition system   **

## 2018-08-25 NOTE — ED PROCEDURE NOTE
PROCEDURE  ECG 12 Lead Documentation  Date/Time: 8/25/2018 1:50 AM  Performed by: Romel Sawyer  Authorized by: Vera OCHOA     ECG reviewed by me, the ED Provider: yes    Patient location:  ED  Previous ECG:     Previous ECG:  Unavailable    Comparison to cardiac monitor: No    Interpretation:     Interpretation: abnormal    Rate:     ECG rate:  95    ECG rate assessment: normal    Rhythm:     Rhythm: sinus rhythm    Ectopy:     Ectopy: none    QRS:     QRS axis:  Normal    QRS intervals:  Normal  Conduction:     Conduction: normal    ST segments:     ST segments:  Normal  T waves:     T waves: peaked             Sushma Dolan MD  08/25/18 5337

## 2018-08-25 NOTE — ED NOTES
DAUGHTER IN LAW STATES PT IS A DNR AND THAT SON HAS POWER OF   SHE STATES SHE WILL BRING ADVANCE DIRECTIVE PAPER WORK TO [address] I INFORM HER OF TRANSFER TIME  SON AND DAUGHTER IN LAW HAVE GONE HOME        Karen Bustillos RN  08/25/18 [id number]

## 2018-08-25 NOTE — PLAN OF CARE
Problem: PHYSICAL THERAPY ADULT  Goal: Performs mobility at highest level of function for planned discharge setting  See evaluation for individualized goals  Treatment/Interventions: ADL retraining, Functional transfer training, LE strengthening/ROM, Therapeutic exercise, Endurance training, Cognitive reorientation, Patient/family training, Bed mobility, Gait training, Equipment eval/education, Compensatory technique education, Spoke to nursing  Equipment Recommended: China Adkins, PT         See flowsheet documentation for full assessment, interventions and recommendations  Outcome: Progressing  Prognosis: Fair  Problem List: Decreased strength, Decreased endurance, Impaired balance, Decreased mobility, Decreased coordination, Impaired judgement, Decreased safety awareness, Impaired hearing, Decreased skin integrity, Pain  Assessment: Pt is 80 y o  female seen for PT evaluation s/p admit to [facility] [place] [address] on 8/25/2018 w/ Acute cystitis without hematuria  PT consulted to assess pt's functional mobility and d/c needs  Order placed for PT eval and tx, w/ activity as tolerated order  Comorbidities affecting pt's physical performance at time of assessment include: falls, acute cystitis, rhabdomyolysis, toxic encephalopathy, HTN  PTA, pt was lives alone in two level house and typically propels in w/c due to falls  Personal factors affecting pt at time of IE include: inaccessible home environment, inability to ambulate household distances, inability to navigate community distances, positive fall history, hearing impairments, inability to perform IADLs, inability to perform ADLs and inability to live alone   Please find objective findings from PT assessment regarding body systems outlined above with impairments and limitations including weakness, impaired balance, decreased endurance, impaired coordination, gait deviations, pain, decreased activity tolerance, decreased functional mobility tolerance, altered sensation, decreased safety awareness, impaired judgement, fall risk and decreased skin integrity  The following objective measures performed on IE also reveal limitations: Barthel Index: 20/100  Pt's clinical presentation is currently unstable/unpredictable seen in pt's presentation of significant mobility impairment, fatigue  Pt to benefit from continued PT tx to address deficits as defined above and maximize level of functional independent mobility and consistency  From PT/mobility standpoint, recommendation at time of d/c would be STR pending progress in order to facilitate return to PLOF  Recommendation: Short-term skilled PT     PT - OK to Discharge: No    See flowsheet documentation for full assessment     Merline Dennison, PT

## 2018-08-25 NOTE — EMTALA/ACUTE CARE TRANSFER
[address] [place], [place] [id number] [phone]  Dept: [address] CONSENT    NAME Kolby Salmeron                                         1931                              MRN [id number]    I have been informed of my rights regarding examination, treatment, and transfer   by Dr Jake Camilo MD    Benefits: Specialized equipment and/or services available at the receiving facility (Include comment)________________________ (Emelia Adkins 107)    Risks: Potential for delay in receiving treatment, Potential deterioration of medical condition, Loss of IV, Increased discomfort during transfer, Possible worsening of condition or death during transfer, Other: (Include comment)__________________________      Transfer Request   I acknowledge that my medical condition has been evaluated and explained to me by the emergency department physician or other qualified medical person and/or my attending physician who has recommended and offered to me further medical examination and treatment  I understand the Hospital's obligation with respect to the treatment and stabilization of my emergency medical condition  I nevertheless request to be transferred  I release the Hospital, the doctor, and any other persons caring for me from all responsibility or liability for any injury or ill effects that may result from my transfer and agree to accept all responsibility for the consequences of my choice to transfer, rather than receive stabilizing treatment at the Hospital  I understand that because the transfer is my request, my insurance may not provide reimbursement for the services  The Hospital will assist and direct me and my family in how to make arrangements for transfer, but the hospital is not liable for any fees charged by the transport service    In spite of this understanding, I refuse to consent to further medical examination and treatment which has been offered to me, and request transfer to [address] [place] [address] Rd Name, [address] :  ([facility])  I authorize the performance of emergency medical procedures and treatments upon me in both transit and upon arrival at the receiving facility  Additionally, I authorize the release of any and all medical records to the receiving facility and request they be transported with me, if possible  I authorize the performance of emergency medical procedures and treatments upon me in both transit and upon arrival at the receiving facility  Additionally, I authorize the release of any and all medical records to the receiving facility and request they be transported with me, if possible  I understand that the safest mode of transportation during a medical emergency is an ambulance and that the Hospital advocates the use of this mode of transport  Risks of traveling to the receiving facility by car, including absence of medical control, life sustaining equipment, such as oxygen, and medical personnel has been explained to me and I fully understand them  ([facility] CORRECT BOX BELOW)  [ X ]  I consent to the stated transfer and to be transported by ambulance/helicopter  [  ]  I consent to the stated transfer, but refuse transportation by ambulance and accept full responsibility for my transportation by car    I understand the risks of non-ambulance transfers and I exonerate the Hospital and its staff from any deterioration in my condition that results from this refusal     X___________________________________________    DATE  18  TIME________  Signature of patient or legally responsible individual signing on patient behalf           RELATIONSHIP TO PATIENT_________________________          Provider Certification    NAME Viviana De Jesusroy Kanwal                                         1931                              MRN [id number]    A medical screening exam was performed on the above named patient  Based on the examination:    Condition Necessitating Transfer The primary encounter diagnosis was Rhabdomyolysis  A diagnosis of Urinary tract infection was also pertinent to this visit  Patient Condition: The patient has been stabilized such that within reasonable medical probability, no material deterioration of the patient condition or the condition of the unborn child(toby) is likely to result from the transfer    Reason for Transfer: Level of Care needed not available at this facility    Transfer Requirements: Facility  ([facility])   · Space available and qualified personnel available for treatment as acknowledged by    · Agreed to accept transfer and to provide appropriate medical treatment as acknowledged by        Lindsey Duarte)  · Appropriate medical records of the examination and treatment of the patient are provided at the time of transfer   [address] _______  · Transfer will be performed by qualified personnel from  [facility])  and appropriate transfer equipment as required, including the use of necessary and appropriate life support measures      Provider Certification: I have examined the patient and explained the following risks and benefits of being transferred/refusing transfer to the patient/family:  General risk, such as traffic hazards, adverse weather conditions, rough terrain or turbulence, possible failure of equipment (including vehicle or aircraft), or consequences of actions of persons outside the control of the transport personnel      Based on these reasonable risks and benefits to the patient and/or the unborn child(toby), and based upon the information available at the time of the patients examination, I certify that the medical benefits reasonably to be expected from the provision of appropriate medical treatments at another medical facility outweigh the increasing risks, if any, to the individuals medical condition, and in the case of labor to the unborn child, from effecting the transfer      X____________________________________________ DATE 08/25/18        TIME_______      ORIGINAL - SEND TO MEDICAL RECORDS   COPY - SEND WITH PATIENT DURING TRANSFER

## 2018-08-25 NOTE — PLAN OF CARE
DISCHARGE PLANNING     Discharge to home or other facility with appropriate resources Not Progressing        INFECTION - ADULT     Absence or prevention of progression during hospitalization Not Progressing     Absence of fever/infection during neutropenic period Not Progressing        Knowledge Deficit     Patient/family/caregiver demonstrates understanding of disease process, treatment plan, medications, and discharge instructions Not Progressing        PAIN - ADULT     Verbalizes/displays adequate comfort level or baseline comfort level Not Progressing        Potential for Falls     Patient will remain free of falls Not Progressing        Prexisting or High Potential for Compromised Skin Integrity     Skin integrity is maintained or improved Not Progressing        SAFETY ADULT     Maintain or return to baseline ADL function Not Progressing     Maintain or return mobility status to optimal level Not Progressing

## 2018-08-25 NOTE — PHYSICAL THERAPY NOTE
Physical Therapy Evaluation     Patient's Name: Shey Herndon    Admitting Diagnosis  UTI (urinary tract infection) [N39 0]    Problem List  Patient Active Problem List   Diagnosis    Acute cystitis without hematuria    Non-traumatic rhabdomyolysis    Essential hypertension    CKD (chronic kidney disease) stage 3, GFR 30-59 ml/min    Hypothyroidism due to acquired atrophy of thyroid    Toxic encephalopathy    Elevated troponin    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, stage 2       Past Medical History  Past Medical History:   Diagnosis Date    Arthritis     CHF (congestive heart failure) (HCC)     CKD (chronic kidney disease) stage 3, GFR 30-59 ml/min 8/25/2018    Disease of thyroid gland     Hypertension     MI, old     TIA (transient ischemic attack)        Past Surgical History  Past Surgical History:   Procedure Laterality Date    ANKLE FRACTURE SURGERY      APPENDECTOMY      CAROTID ARTERY - SUBCLAVIAN ARTERY BYPASS GRAFT      HYSTERECTOMY      TONSILLECTOMY        08/25/18 0825   Note Type   Note type Eval/Treat   Pain Assessment   Pain Assessment 0-10   Pain Score 8   Pain Type Chronic pain  (x one month)   Pain Location Hip   Pain Orientation Left   Pain Descriptors Aching;Burning   Pain Frequency Intermittent   Pain Onset Ongoing   Clinical Progression Not changed   Hospital Pain Intervention(s) Medication (See MAR)   Home Living   Type of [address] [place] [address] Two level;Ramped entrance; Access   Bathroom Shower/Tub Walk-in shower  ("my shower broke", sponge bathing)   Bathroom Toilet Raised  (3 inch riser,"it slips")   Bathroom Equipment Grab bars around toilet; Shower chair   Bathroom Accessibility Accessible   Home Equipment Wheelchair-manual;Walker; Hospital bed; Other (Comment)  (had returned it, "they lost it")   Prior Function   Level of [place] Independent with ADLs and functional mobility; Needs assistance with IADLs  (meals on wheels) Lives With Alone   Receives Help From Family  (visit patient 1x/week, they work)   ADL Assistance Independent   IADLs Needs assistance  (has a "")   Falls in the last 6 months 5 to 10   Vocational Retired   Restrictions/Precautions   [facility] Bearing Precautions Per Order No   Other Precautions Hard of hearing; Fall Risk   Cognition   Overall Cognitive Status Impaired   Arousal/Participation Responsive   Orientation Level Oriented to person;Oriented to time  (was unsure of the hospital)   Memory Decreased long term memory;Decreased recall of recent events   Following Commands Follows one step commands with increased time or repetition   RLE Assessment   RLE Assessment X   Strength RLE   R Hip Flexion 2+/5   R Hip Extension 2+/5   R Knee Flexion 2+/5   R Knee Extension 2+/5   LLE Assessment   LLE Assessment X   Strength LLE   L Hip Flexion 2+/5   L Hip Extension 2+/5   L Knee Flexion 2+/5   L Knee Extension 2+/5   Bed Mobility   Rolling R 3  Moderate assistance   Additional items Assist x 1;Bedrails   Rolling L 3  Moderate assistance   Additional items Assist x 1;Bedrails   Supine to Sit 2  Maximal assistance   Additional items Assist x 1;Bedrails   Sit to Supine 2  Maximal assistance   Additional items Assist x 2   Additional Comments patient able to sit x 2 minutes on bedside   Transfers   Sit to Stand Unable to assess   Additional Comments patient was returned to bed secondary to fatigue   Balance   Static Sitting Fair -   Dynamic Sitting Poor  (tactile cues 100% of session)   Endurance Deficit   Endurance Deficit Yes   Endurance Deficit Description increase fatigue with minimal exertion   Activity Tolerance   Activity Tolerance Patient limited by fatigue   Nurse Made Aware nurse present   Assessment   Prognosis Fair   Problem List Decreased strength;Decreased endurance; Impaired balance;Decreased mobility; Decreased coordination; Impaired judgement;Decreased safety awareness; Impaired hearing;Decreased skin integrity;Pain   Assessment Pt is 80 y o  female seen for PT evaluation s/p [facility] 8/25/2018 w/ Acute cystitis without hematuria  PT consulted to assess pt's functional mobility and d/c needs  Order placed for PT eval and tx, w/ activity as tolerated order  Comorbidities affecting pt's physical performance at time of assessment include: falls, acute cystitis, rhabdomyolysis, toxic encephalopathy, HTN  PTA, pt was lives alone in two level house and typically propels in w/c due to falls  Personal factors affecting pt at time of IE include: inaccessible home environment, inability to ambulate household distances, inability to navigate community distances, positive fall history, hearing impairments, inability to perform IADLs, inability to perform ADLs and inability to live alone  Please find objective findings from PT assessment regarding body systems outlined above with impairments and limitations including weakness, impaired balance, decreased endurance, impaired coordination, gait deviations, pain, decreased activity tolerance, decreased functional mobility tolerance, altered sensation, decreased safety awareness, impaired judgement, fall risk and decreased skin integrity  The following objective measures performed on IE also reveal limitations: Barthel Index: 20/100  Pt's clinical presentation is currently unstable/unpredictable seen in pt's presentation of significant mobility impairment, fatigue  Pt to benefit from continued PT tx to address deficits as defined above and maximize level of functional independent mobility and consistency  From PT/mobility standpoint, recommendation at time of d/c would be STR pending progress in order to facilitate return to PLOF  Goals   Patient Goals feel better   [facility] Expiration Date 08/30/18   Short Term Goal #1 Pt will perform bed mobility tasks to mod  A of 1 to decrease burden of care   Perform transfers to mod  A of 2 to decrease risk of skin breakdown with change of position  Perform ambulation with RW for 10 feet, mod A of 2 to improve activity tolerance  Patient will tolerate AAROM BLE's to decrease risk of contractures and facilitate joint proprioception  Patient will demonstrate increased static sitting balance to Fair,  Tactile cues 75% of treatment session to facilitate activation of stabilizing muscles and prepare for safe transfers  LTG Expiration Date 09/04/18   Long Term Goal #1 Pt will perform bed mobility tasks to min  A of 1 to decrease burden of care  Perform transfers to min  A of 2 to decrease risk of skin breakdown with change of position  Perform ambulation with RW for 20 feet, min A of 2 to improve activity tolerance  Patient will tolerate AAROM BLE's to decrease risk of contractures and facilitate joint proprioception  Patient will demonstrate increased static sitting balance to Fair+,  Tactile cues 50% of treatment session to facilitate activation of stabilizing muscles and prepare for safe transfers  Plan   Treatment/Interventions ADL retraining;Functional transfer training;LE strengthening/ROM; Therapeutic exercise; Endurance training;Cognitive reorientation;Patient/family training;Bed mobility;Gait training;Equipment eval/education; Compensatory technique education;Spoke to nursing   PT Frequency 5x/wk   Recommendation   Recommendation Short-term skilled PT   Equipment Recommended Walker   PT - OK to Discharge No   Barthel Index   Feeding 5   Bathing 0   Grooming Score 0   Dressing Score 5   Bladder Score 5   Bowels Score 5   Toilet Use Score 0   Transfers (Bed/Chair) Score 0   Mobility (Level Surface) Score 0   Stairs Score 0   Barthel Index Score 20     Jake Mandel PT

## 2018-08-25 NOTE — NURSING NOTE
hospitalist was in earlier to see and assess the pt at her bedside, shown wounds, orders received and wound MD was in to see and evaluate the pt at the bedside, orders noted, wound cx sent to the lab, no other changes in the pts assess at this time, pt presently in bed in no acute distress watching tv and visiting with family, callbell in reach of the pt, encouraged to ring with any needs and the pt agrees

## 2018-08-25 NOTE — PLAN OF CARE
Problem: OCCUPATIONAL THERAPY ADULT  Goal: Performs self-care activities at highest level of function for planned discharge setting  See evaluation for individualized goals  Treatment Interventions: ADL retraining, UE strengthening/ROM, Cognitive reorientation, Patient/family training          See flowsheet documentation for full assessment, interventions and recommendations  Outcome: Progressing  Limitation: Decreased ADL status, Decreased UE ROM, Decreased UE strength, Decreased Safe judgement during ADL, Decreased cognition, Decreased endurance     Assessment: Pt is a 80 y o  female seen for OT evaluation s/p admit to [facility] on 8/25/2018 w/ Acute cystitis without hematuria  Comorbidities affecting pt's functional performance at time of assessment include: limited hearing, limited cognition and dementia  Personal factors affecting pt at time of IE include:difficulty performing ADLS, difficulty performing IADLS  and limited insight into deficits  Prior to admission, pt was living alone and was having significant number of falls  Upon evaluation: Pt requires  OT tx 2* the following deficits impacting occupational performance: decreased ROM, decreased strength, decreased balance, decreased tolerance, impaired memory, impaired problem solving, decreased safety awareness and increased pain  Pt to benefit from continued skilled OT tx while in the hospital to address deficits as defined above and maximize level of functional independence w ADL's and functional mobility  Occupational Performance areas to address include: grooming, bathing/shower, toilet hygiene, dressing and functional mobility  From OT standpoint, recommendation at time of d/c would be continuation of therapy services through skilled nursing and rehab     Artist OY Kendrick

## 2018-08-25 NOTE — CONSULTS
REASON FOR CONSULTATION:   Multiple wounds involving left shoulder, left hip and left shin  HPI: Rebecca Stephenson is a 80y o  year old female who is admitted at medical surgical floor after she was found on the floor for unknown duration with the rhabdo myolysis  Patient was also found to have evidence of Proteus mirabilis UTI and has been started on Levaquin  Wound consultation was called in as patient was noticed to have multiple wounds involving the left shoulder, left hip, left shin and a right medial malleolar area  Patient was examined at bedside  Patient thinks that she has these wounds for 1-2 months  Patient denied any pain at the site of the wounds  Patient claims that she was taking care of herself at home and was ambulatory  Review of Systems   All other systems reviewed and are negative        Historical Information   Past Medical History:   Diagnosis Date    Arthritis     CHF (congestive heart failure) (HCC)     CKD (chronic kidney disease) stage 3, GFR 30-59 ml/min 8/25/2018    Disease of thyroid gland     Hypertension     MI, old     TIA (transient ischemic attack)      Past Surgical History:   Procedure Laterality Date    ANKLE FRACTURE SURGERY      APPENDECTOMY      CAROTID ARTERY - SUBCLAVIAN ARTERY BYPASS GRAFT      HYSTERECTOMY      TONSILLECTOMY       Social History   History   Alcohol Use No     History   Drug Use No     History   Smoking Status    Former Smoker    Types: Cigarettes    Quit date: 1985   Smokeless Tobacco    Never Used     Family History: non-contributory    Meds/Allergies   all current active meds have been reviewed  Allergies   Allergen Reactions    Penicillins        Social History:   Social History     Social History    Marital status: /Civil Union     Spouse name: N/A    Number of children: N/A    Years of education: N/A     Social History Main Topics    Smoking status: Former Smoker     Types: Cigarettes     Quit date: 1985  Smokeless tobacco: Never Used    Alcohol use No    Drug use: No    Sexual activity: No     Other Topics Concern    None     Social History Narrative    None       Family History:   Family History   Problem Relation Age of Onset    Heart disease Brother          Objective   Vitals: Blood pressure 140/60, pulse 83, temperature 99 6 °F (37 6 °C), temperature source Tympanic, resp  rate 18, height 5' 7" (1 702 m), weight 60 4 kg (133 lb 1 6 oz), SpO2 96 %  Intake/Output Summary (Last 24 hours) at 08/25/18 1302  Last data filed at 08/25/18 1200   Gross per 24 hour   Intake             1480 ml   Output                0 ml   Net             1480 ml     Invasive Devices     Peripheral Intravenous Line            Peripheral IV 08/25/18 Right Antecubital less than 1 day                Physical Exam   Constitutional: She is oriented to person, place, and time  She appears well-developed and well-nourished  HENT:   Head: Normocephalic and atraumatic  Eyes: EOM are normal  Pupils are equal, round, and reactive to light  Neck: Normal range of motion  Neck supple  Cardiovascular: Normal rate and regular rhythm  Pulmonary/Chest: Effort normal and breath sounds normal    Abdominal: Soft  Bowel sounds are normal    Musculoskeletal: Normal range of motion  Neurological: She is alert and oriented to person, place, and time  Skin: Skin is warm  Dry skin with venous stasis involving bilateral lower extremities  MULTIPLE WOUNDS:  1  Left shoulder wound with dimensions 4 6 centimeter x 8 5 centimeter x 0 1 centimeter  Mild serous discharge, no Odor, wound base has graciously white slough covering 70 percent of the wound  Moderate tenderness on palpation around the kalee wound area skin  2   Left hip wound:  Resolving stage II pressure ulcer on Iliac bone prominence  3  Left shin healing venous stasis ulcer     IMPRESSION:  1  Left shoulder open wound possibly pressure ulcer    2   Left hip healing stage II pressure ulcer  3   Left shin venous stasis ulcer  4  Right medial malleolar pressure ulcer stage I    RECOMMENDATIONS:  1  Wound culture has been taken from the left shoulder wound for Gram stain and culture and sensitivity  2   I will use Silvadene 1 percent cream to be applied on all the open wounds to be covered by nonstick gauze  3   I will use ammonium lactate lotion over the dry skin involving both legs    Thank you for consulting me to take part in the care of this patient I will be available for any further questions  Code Status: Level 3 - DNAR and DNI  Advance Directive and Living Will:     Patient has no living will or anybody appointed as her legal power-of-  Patient thinks that her son will make decisions on her behalf  Power of :   None    Counseling / Coordination of Care  Total floor time spent today 50 minutes  Greater than 50% of total time was spent with the patient counseling and / or coordination of care

## 2018-08-25 NOTE — OCCUPATIONAL THERAPY NOTE
Chief Complaint:   Fall and confusion     History of Present Illness:     Jennifer English is a 80 y o  female who presents with multiple falls  Patient was seen in ER on 8/21 and diagnosed with UTI, she was noted to have confusion at that time  She has had multiple falls recently,  had injury left shoulder and left hip approximately 1 week ago  Today patient fell and was down for about 6 hours and could not get up  Patient was found down by Police after not answering her phone calls from family  Family reported visual hallucinations of flying cats  Family is concerned about home safety and may rquest placement for safety, limited mobility, confusion  Patient reports didn't eat for 2 days  States she has had multiple bouts diarrhea since she completed antibiotic  She was transferred from [facility] to [facility] for admission  08/25/18 1055   Note Type   Note type Eval/Treat   Restrictions/Precautions   Weight Bearing Precautions Per Order No   Other Precautions Contact/isolation;Cognitive; Fall Risk;Pain;Hard of hearing   Pain Assessment   Pain Assessment 0-10   Pain Location Shoulder   Pain Orientation Left   Pain Descriptors Discomfort; Stabbing   Pain Frequency Intermittent   Pain Onset Ongoing   Clinical Progression Other (Comment)  ("worse since I fell on that side again")   Hospital Pain Intervention(s) Medication (See MAR)   Home Living   Type of [address] [place] [address] Two level;Ramped entrance   Bathroom Shower/Tub Walk-in shower   Bathroom Toilet Raised   Bathroom Equipment Grab bars in shower;Grab bars around toilet   [facility]   Prior Function   Level of [place] Needs assistance with IADLs; Needs assistance with ADLs and functional mobility; Other (Comment)  (patient reports she has weekly assist with sponge bath )   Lives With Alone   Receives Help From Family;Home health   ADL Assistance Independent   IADLs Needs assistance   Falls in the last 6 months 5 to 10 Comments (patient reports " less falls since I use the w/c")   Subjective   Subjective Patient reports she does not wear socks and shoes due to difficulty donning   ADL   Where Assessed Supine, bed   Eating Assistance 4  Minimal Assistance   Grooming Assistance 2  Maximal Assistance   UB Bathing Assistance 2  Maximal Assistance   LB Bathing Assistance 1  Total Assistance   [address] 1  Total Assistance   LB Dressing Assistance 1  Total [address]  1  Total Assistance   Activity Tolerance   Activity Tolerance Patient limited by fatigue;Patient limited by pain   Nurse Made Aware nurse present   RUE Assessment   RUE Assessment X  (no active shoulder movement all planes)   LUE Assessment   LUE Assessment X  (no active shoulder movement all planes)   Hand Function   Gross Motor Coordination Functional   Vision-Basic Assessment   Current Vision Wears glasses all the time   Cognition   Overall Cognitive Status Impaired   Orientation Level Oriented to person;Oriented to place;Oriented to situation   Memory Decreased short term memory   Following Commands Follows one step commands without difficulty   Assessment   Limitation Decreased ADL status; Decreased UE ROM; Decreased UE strength;Decreased Safe judgement during ADL;Decreased cognition;Decreased endurance   Assessment Pt is a 80 y o  female seen for OT evaluation s/p admit to [facility] on [facility]/25/2018 w/ Acute cystitis without hematuria  Comorbidities affecting pt's functional performance at time of assessment include: limited hearing, limited cognition and dementia  Personal factors affecting pt at time of IE include:difficulty performing ADLS, difficulty performing IADLS  and limited insight into deficits  Prior to admission, pt was living alone and was having significant number of falls   Upon evaluation: Pt requires  OT tx 2* the following deficits impacting occupational performance: decreased ROM, decreased strength, decreased balance, decreased tolerance, impaired memory, impaired problem solving, decreased safety awareness and increased pain  Pt to benefit from continued skilled OT tx while in the hospital to address deficits as defined above and maximize level of functional independence w ADL's and functional mobility  Occupational Performance areas to address include: grooming, bathing/shower, toilet hygiene, dressing and functional mobility  From OT standpoint, recommendation at time of d/c would be continuation of therapy services through skilled nursing and rehab  Goals   Patient Goals " to get back home where I can take care of myself"   ADL Goals   Pt Will Perform Grooming At edge of bed; With min assist   Pt Will Perform UE Dressing At edge of bed; With min assist   Pt Will Perform Toileting With min assist   Plan   Treatment Interventions ADL retraining;UE strengthening/ROM; Cognitive reorientation;Patient/family training   Goal Expiration Date 09/04/18   OT Frequency 3-5x/wk   Barthel Index   Feeding 5   Bathing 0   Grooming Score 0   Dressing Score 0   Bladder Score 5   Bowels Score 5   Toilet Use Score 0   Transfers (Bed/Chair) Score 0   Mobility (Level Surface) Score 0   Stairs Score 0   Barthel Index Score 15   Aryan Martinez, OT

## 2018-08-25 NOTE — ASSESSMENT & PLAN NOTE
· Likely secondary to rhabdo and infection  · Continue to trend and if increases, Cardiology consult will be requested

## 2018-08-26 LAB
ANION GAP SERPL CALCULATED.3IONS-SCNC: 5 MMOL/L (ref 4–13)
BASOPHILS # BLD AUTO: 0 THOUSANDS/ΜL (ref 0–0.1)
BASOPHILS NFR BLD AUTO: 1 % (ref 0–2)
BUN SERPL-MCNC: 27 MG/DL (ref 7–25)
CALCIUM SERPL-MCNC: 8.6 MG/DL (ref 8.6–10.5)
CHLORIDE SERPL-SCNC: 112 MMOL/L (ref 98–107)
CK MB SERPL-MCNC: 1.2 % (ref 0.6–6.3)
CK MB SERPL-MCNC: 3 % (ref 0.6–6.3)
CK MB SERPL-MCNC: 4.6 NG/ML (ref 0.6–6.3)
CK MB SERPL-MCNC: 8.1 NG/ML (ref 0.6–6.3)
CK SERPL-CCNC: 274 U/L (ref 30–192)
CK SERPL-CCNC: 381 U/L (ref 30–192)
CO2 SERPL-SCNC: 25 MMOL/L (ref 21–31)
CREAT SERPL-MCNC: 1.08 MG/DL (ref 0.6–1.2)
EOSINOPHIL # BLD AUTO: 0.1 THOUSAND/ΜL (ref 0–0.61)
EOSINOPHIL NFR BLD AUTO: 1 % (ref 0–5)
ERYTHROCYTE [DISTWIDTH] IN BLOOD BY AUTOMATED COUNT: 16.1 % (ref 11.5–14.5)
GFR SERPL CREATININE-BSD FRML MDRD: 46 ML/MIN/1.73SQ M
GLUCOSE SERPL-MCNC: 89 MG/DL (ref 65–99)
HCT VFR BLD AUTO: 27.2 % (ref 34.8–46.1)
HGB BLD-MCNC: 9.3 G/DL (ref 12–16)
LYMPHOCYTES # BLD AUTO: 1.9 THOUSANDS/ΜL (ref 0.6–4.47)
LYMPHOCYTES NFR BLD AUTO: 29 % (ref 21–51)
MCH RBC QN AUTO: 30 PG (ref 26–34)
MCHC RBC AUTO-ENTMCNC: 34.3 G/DL (ref 31–37)
MCV RBC AUTO: 87 FL (ref 81–99)
MONOCYTES # BLD AUTO: 1 THOUSAND/ΜL (ref 0.17–1.22)
MONOCYTES NFR BLD AUTO: 15 % (ref 2–12)
NEUTROPHILS # BLD AUTO: 3.4 THOUSANDS/ΜL (ref 1.4–6.5)
NEUTS SEG NFR BLD AUTO: 54 % (ref 42–75)
NRBC BLD AUTO-RTO: 0 /100 WBCS
PLATELET # BLD AUTO: 237 THOUSANDS/UL (ref 149–390)
PMV BLD AUTO: 8 FL (ref 8.6–11.7)
POTASSIUM SERPL-SCNC: 3.8 MMOL/L (ref 3.5–5.5)
RBC # BLD AUTO: 3.11 MILLION/UL (ref 3.9–5.2)
SODIUM SERPL-SCNC: 142 MMOL/L (ref 134–143)
WBC # BLD AUTO: 6.4 THOUSAND/UL (ref 4.8–10.8)

## 2018-08-26 PROCEDURE — 82553 CREATINE MB FRACTION: CPT | Performed by: HOSPITALIST

## 2018-08-26 PROCEDURE — 85025 COMPLETE CBC W/AUTO DIFF WBC: CPT | Performed by: HOSPITALIST

## 2018-08-26 PROCEDURE — 99232 SBSQ HOSP IP/OBS MODERATE 35: CPT | Performed by: HOSPITALIST

## 2018-08-26 PROCEDURE — 80048 BASIC METABOLIC PNL TOTAL CA: CPT | Performed by: HOSPITALIST

## 2018-08-26 PROCEDURE — 82553 CREATINE MB FRACTION: CPT | Performed by: PHYSICIAN ASSISTANT

## 2018-08-26 PROCEDURE — 82550 ASSAY OF CK (CPK): CPT | Performed by: PHYSICIAN ASSISTANT

## 2018-08-26 PROCEDURE — 82550 ASSAY OF CK (CPK): CPT | Performed by: HOSPITALIST

## 2018-08-26 RX ADMIN — LEVOTHYROXINE SODIUM 75 MCG: 75 TABLET ORAL at 06:37

## 2018-08-26 RX ADMIN — SODIUM CHLORIDE 100 ML/HR: 9 INJECTION, SOLUTION INTRAVENOUS at 08:34

## 2018-08-26 RX ADMIN — Medication: at 08:38

## 2018-08-26 RX ADMIN — SILVER SULFADIAZINE: 10 CREAM TOPICAL at 08:38

## 2018-08-26 RX ADMIN — ASPIRIN 81 MG: 81 TABLET, COATED ORAL at 08:23

## 2018-08-26 RX ADMIN — LISINOPRIL 5 MG: 5 TABLET ORAL at 08:23

## 2018-08-26 RX ADMIN — HEPARIN SODIUM 5000 UNITS: 5000 INJECTION INTRAVENOUS; SUBCUTANEOUS at 06:37

## 2018-08-26 RX ADMIN — CLOTRIMAZOLE: 10 CREAM TOPICAL at 08:38

## 2018-08-26 RX ADMIN — HEPARIN SODIUM 5000 UNITS: 5000 INJECTION INTRAVENOUS; SUBCUTANEOUS at 13:53

## 2018-08-26 RX ADMIN — HEPARIN SODIUM 5000 UNITS: 5000 INJECTION INTRAVENOUS; SUBCUTANEOUS at 21:21

## 2018-08-26 NOTE — ASSESSMENT & PLAN NOTE
·  CPK almost resolved  · No evidence of renal insufficiency  · Status post IV fluid therapy  · No further workup

## 2018-08-26 NOTE — NURSING NOTE
Pt resting in bed comfortably in bed  Vitals WNL  HRR  Lungs CTA  + BS  Tab alarm in place  Offers no c/o pain or discomfort  IV patent and intact  Call bell within reach  Fall precaution stablished and maintained  Bed in lowest position  Will continue to monitor

## 2018-08-26 NOTE — ASSESSMENT & PLAN NOTE
· Has significantly improved   · Patient is  alert awake oriented x3 -is a little forgetful however  · This is secondary to the urinary tract infection

## 2018-08-26 NOTE — PROGRESS NOTES
Progress Note - Luther Dunham 5/24/1931, 80 y o  female MRN: [id number]    Unit/Bed#: -01 Encounter: [id number]    Primary Care Provider: Baron Agustin DO   Date and time admitted to hospital: 8/25/2018  4:36 AM        Toxic encephalopathy   Assessment & Plan    · Has significantly improved   · Patient is  alert awake oriented x3 -is a little forgetful however  · This is secondary to the urinary tract infection        * Acute cystitis without hematuria   Assessment & Plan    · Secondary to Proteus mirabilis UTI  · Continue IV Levaquin         Non-traumatic rhabdomyolysis   Assessment & Plan    ·  CPK almost resolved  · No evidence of renal insufficiency  · Status post IV fluid therapy  · No further workup          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, stage 2   Assessment & Plan    · Patient is status post a wound care evaluation  · Continue supportive therapy        Hypothyroidism due to acquired atrophy of thyroid   Assessment & Plan    · Continue levothyroxine        Essential hypertension   Assessment & Plan    · Continue present blood pressure medications         Elevated troponin   Assessment & Plan    · This is a non ST segment elevation MI type 2   · Troponins are equivocal they are not climbing or dropping -cardiology evaluation is pending  · Continue present meds        CKD (chronic kidney disease) stage 3, GFR 30-59 ml/min   Assessment & Plan    · Creatinine stable               VTE Pharmacologic Prophylaxis: Pharmacologic: Heparin    Patient Centered Rounds: I have performed bedside rounds with nursing staff today  Discussions with Specialists or Other Care Team Provider:  None  Education and Discussions with Family / Patient:   Patient    Time Spent for Care: 15 minutes  More than 50% of total time spent on counseling and coordination of care as described above      Current Length of Stay: 1 day(s)    Current Patient Status: Inpatient Certification Statement: The patient will continue to require additional inpatient hospital stay due to The need for continued IV antibiotics and possible placement by case management    Discharge Plan:  Hopeful discharge planning 24-48 hours    Code Status: Level 3 - DNAR and DNI    Subjective:   Patient seen and examined  No new complaints no distress  I was notified by by an employee in respiratory that she knows the patient from outside of the hospital and states that there is no way this patient can take care of herself at home  Patient lives home alone  Objective:     Vitals:   Temp (24hrs), Av 8 °F (37 1 °C), Min:96 1 °F (35 6 °C), Max:99 6 °F (37 6 °C)    HR:  [82-93] 87  Resp:  [16-20] 18  BP: (120-166)/(57-84) 157/73  SpO2:  [91 %-94 %] 94 %  Body mass index is 20 85 kg/m²  Input and Output Summary (last 24 hours): Intake/Output Summary (Last 24 hours) at 18 1458  Last data filed at 18 4722   Gross per 24 hour   Intake          2503 33 ml   Output                0 ml   Net          2503 33 ml       Physical Exam:     Physical Exam   Constitutional: She is oriented to person, place, and time  She appears well-developed and well-nourished  HENT:   Head: Normocephalic and atraumatic  Nose: Nose normal    Mouth/Throat: Oropharynx is clear and moist    Eyes: Conjunctivae and EOM are normal  Pupils are equal, round, and reactive to light  Neck: Normal range of motion  Neck supple  No JVD present  No thyromegaly present  Cardiovascular: Normal rate, regular rhythm and intact distal pulses  Exam reveals no gallop and no friction rub  No murmur heard  Pulmonary/Chest: Effort normal and breath sounds normal  No respiratory distress  Abdominal: Soft  Bowel sounds are normal  She exhibits no distension and no mass  There is no tenderness  There is no guarding  Musculoskeletal: Normal range of motion  She exhibits no edema     Lymphadenopathy:     She has no cervical adenopathy  Neurological: She is alert and oriented to person, place, and time  No cranial nerve deficit  Skin: Skin is warm  No rash noted  No erythema  Psychiatric: She has a normal mood and affect  Her behavior is normal    Nursing note and vitals reviewed  Additional Data:     Labs:      Results from last 7 days  Lab Units 08/26/18  0508   WBC Thousand/uL 6 40   HEMOGLOBIN g/dL 9 3*   HEMATOCRIT % 27 2*   PLATELETS Thousands/uL 237   NEUTROS PCT % 54   LYMPHS PCT % 29   MONOS PCT % 15*   EOS PCT % 1       Results from last 7 days  Lab Units 08/26/18  0508  08/25/18  0107   SODIUM mmol/L 142  < > 140   POTASSIUM mmol/L 3 8  < > 4 4   CHLORIDE mmol/L 112*  < > 103   CO2 mmol/L 25  < > 24   BUN mg/dL 27*  < > 30*   CREATININE mg/dL 1 08  < > 1 15   CALCIUM mg/dL 8 6  < > 9 6   TOTAL PROTEIN g/dL  --   --  7 3   BILIRUBIN TOTAL mg/dL  --   --  0 70   ALK PHOS U/L  --   --  145   ALT U/L  --   --  19   AST U/L  --   --  51*   GLUCOSE RANDOM mg/dL 89  < > 109   < > = values in this interval not displayed  Results from last 7 days  Lab Units 08/21/18  2201   INR  1 09               * I Have Reviewed All Lab Data Listed Above  * Additional Pertinent Lab Tests Reviewed: Dago 66 Admission  Reviewed    Imaging:  Imaging Reports Reviewed Today Include:  None    Recent Cultures (last 7 days):       Results from last 7 days  Lab Units 08/25/18  1347 08/25/18  0200 08/21/18  2256 08/21/18  2201   BLOOD CULTURE   --   --   --  No Growth After 4 Days  No Growth After 4 Days     GRAM STAIN RESULT  Rare Polys  No bacteria seen  --   --   --    URINE CULTURE   --  80,000-89,000 cfu/ml Gram Negative Juan* >100,000 cfu/ml Proteus mirabilis*  --    WOUND CULTURE  Culture too young- will reincubate  --   --   --        Last 24 Hours Medication List:     Current Facility-Administered Medications:  ammonium lactate  Topical BID Kathleen Michel MD    aspirin 81 mg Oral Daily Rory Lindsay PA-C clotrimazole  Topical BID Elby Jonatan, PA-C    heparin (porcine) 5,000 Units Subcutaneous [facility] Elby Jonatan, PA-C    levofloxacin 500 mg Intravenous Q48H Elby Jonatan, PA-C Last Rate: 500 mg (08/25/18 0530)   levothyroxine 75 mcg Oral Early Morning Elby Jonatan, PA-C    lisinopril 5 mg Oral Daily Elby Jonatan, PA-C    silver sulfadiazine  Topical BID Carmen French MD    sodium chloride 100 mL/hr Intravenous Continuous Elby Jonatan, PA-C Last Rate: 100 mL/hr (08/26/18 0834)        Today, Patient Was Seen By: Liliana Campbell MD    ** Please Note: Dictation voice to text software may have been used in the creation of this document   **

## 2018-08-26 NOTE — ASSESSMENT & PLAN NOTE
· This is a non ST segment elevation MI type 2   · Troponins are equivocal they are not climbing or dropping -cardiology evaluation is pending  · Continue present meds

## 2018-08-26 NOTE — CASE MANAGEMENT
Initial Clinical Review    Admission: Date/Time/Statement: 8/25/18 @ 0436     Orders Placed This Encounter   Procedures    Inpatient Admission     Standing Status:   Standing     Number of Occurrences:   1     Order Specific Question:   Admitting Physician     Answer:   Toro Dominguez [[id number]]     Order Specific Question:   Level of Care     Answer:   Med Surg [16]     Order Specific Question:   Estimated length of stay     Answer:   More than 2 Midnights     Order Specific Question:   Certification     Answer:   I certify that inpatient services are medically necessary for this patient for a duration of greater than two midnights  See H&P and MD Progress Notes for additional information about the patient's course of treatment  History of Illness: Ling Colvin is a 80 y o  female who presents with multiple falls  Patient was seen in ER on 8/21 and diagnosed with UTI, she was noted to have confusion at that time  She has had multiple falls recently,  had injury left shoulder and left hip approximately 1 week ago  Today patient fell and was down for about 6 hours and could not get up  Patient was found down by Police after not answering her phone calls from family  Family reported visual hallucinations of flying cats  Family is concerned about home safety and may rquest placement for safety, limited mobility, confusion  Patient reports didn't eat for 2 days  States she has had multiple bouts diarrhea since she completed antibiotic  She was [facility] from [facility] to [facility] for admission  Decreased breath sounds bilaterally  Awake and alert, speech intact, moving extremities   Skin: Skin is warm and dry  No rash noted  There is erythema (lower extremities with redness R>L)     ED Vital Signs:   ED Triage Vitals   Temperature Pulse Respirations Blood Pressure SpO2   08/25/18 0441 08/25/18 0441 08/25/18 0441 08/25/18 0441 08/25/18 0441   98 4 °F (36 9 °C) 97 20 136/68 97 %      Temp Source Heart Rate Source Patient Position - Orthostatic VS BP Location FiO2 (%)   08/25/18 0441 08/25/18 0441 08/25/18 0752 08/25/18 0441 --   Temporal Monitor Lying Right arm       Pain Score       08/25/18 0441       8        Wt Readings from Last 1 Encounters:   08/25/18 60 4 kg (133 lb 1 6 oz)       Abnormal Labs/Diagnostic Test Results: BUN 30 CKMB 28 7 CK 1276, TROPONIN 0 19  0 15,0 15, UA CLOUDY 15 KETONES +NITRITE  3+ LEUKOCYTES INNUME WBC   CXR Cardiomegaly with slightly increased interstitial markings bilaterally  which may represent viral upper respiratory infection or mild pulmonary  edema in the appropriate clinical setting  XRAY LEFT SHOULDER  Osteopenia with mild to moderate age-related osteoarthritic change of the left shoulder  LEFT HIP XRAY  Osteopenia  No definite acute fracture or dislocation identified  CT HEAD  No evidence of acute intracranial abnormality  ED Treatment:   Medication Administration - No Administrations Displayed (No Start Event Found)     None          Past Medical/Surgical History:    Active Ambulatory Problems     Diagnosis Date Noted    No Active Ambulatory Problems     Resolved Ambulatory Problems     Diagnosis Date Noted    No Resolved Ambulatory Problems     Past Medical History:   Diagnosis Date    Arthritis     [facility]est[facility])     CKD (chronic kidney disease) stage 3, GFR 30-59 ml/min 8/25/2018    Disease of thyroid gland     Hypertension     MI, old     TIA (transient ischemic attack)        Admitting Diagnosis: UTI (urinary tract infection) [N39 0]    Age/Sex: 80 y o  female    Assessment/Plan:   Acute cystitis without hematuria   Assessment & Plan     · Proteus Mirabalis on Ucx 8/21  · Start Levaquin with PCN allergy          Non-traumatic rhabdomyolysis   Assessment & Plan     · IVF  · Monitor CPK       Elevated troponin   Assessment & Plan     · Likely secondary to rhabdo and infection  · Continue to trend and if increases, Cardiology consult will be requested       Toxic encephalopathy   Assessment & Plan     · Secondary to UTI  · Supportive care       Essential hypertension   Assessment & Plan     · BP ok  · Continue meds and monitor          CKD (chronic kidney disease) stage 3, GFR 30-59 ml/min   Assessment & Plan     · Creat stable  · Monitor       Hypothyroidism due to acquired atrophy of thyroid   Assessment & Plan     · Continue levothyroxine       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, stage 2   Assessment & Plan     · Local care  · Frequent position changes         VTE Prophylaxis: Heparin  Code Status: DNR/DNI per discussion with Dr Ivon Salmeron and family will need to be verified, per nurse Son to bring in living will  POLST: POLST is not applicable to this patient  Discussion with family: none present at this time  Anticipated Length of Stay:  Patient will be admitted on an Inpatient basis with an anticipated length of stay of  > 2 midnights     Justification for Hospital Stay: IV antibiotics, IVF, safe d/c planning       Admission Orders:  TELE MON  PT OT  STOOL ENTERIC BACTERIAL PANEL  STOOL WBC GS, C DIFF TOXIN   WOUND CARE  Scheduled Meds:   Current Facility-Administered Medications:  ammonium lactate  Topical BID Caty Escoto MD    aspirin 81 mg Oral Daily Bart Chowdhury PA-C    clotrimazole  Topical BID Bart Chowdhury PA-C    heparin (porcine) 5,000 Units Subcutaneous [facility] Bart Chowdhury PA-C    levofloxacin 500 mg Intravenous Q48H Bart Chowdhury PA-C Last Rate: 500 mg (08/25/18 0530)   levothyroxine 75 mcg Oral Early Morning Bart Chowdhury PA-C    lisinopril 5 mg Oral Daily Bart Chowdhury PA-C    silver sulfadiazine  Topical BID Cayt Escoto MD    sodium chloride 100 mL/hr Intravenous Continuous Bart Chowdhury PA-C Last Rate: 100 mL/hr (08/26/18 0834)     Continuous Infusions:   sodium chloride 100 mL/hr Last Rate: 100 mL/hr (08/26/18 0834)     PRN Meds:

## 2018-08-27 PROBLEM — E44.1 MILD PROTEIN-CALORIE MALNUTRITION (HCC): Status: ACTIVE | Noted: 2018-08-27

## 2018-08-27 LAB
ANION GAP SERPL CALCULATED.3IONS-SCNC: 8 MMOL/L (ref 4–13)
BACTERIA BLD CULT: NORMAL
BACTERIA BLD CULT: NORMAL
BACTERIA UR CULT: ABNORMAL
BACTERIA WND AEROBE CULT: NORMAL
BASOPHILS # BLD AUTO: 0 THOUSANDS/ΜL (ref 0–0.1)
BASOPHILS NFR BLD AUTO: 1 % (ref 0–2)
BUN SERPL-MCNC: 23 MG/DL (ref 7–25)
CALCIUM SERPL-MCNC: 8.5 MG/DL (ref 8.6–10.5)
CHLORIDE SERPL-SCNC: 113 MMOL/L (ref 98–107)
CO2 SERPL-SCNC: 24 MMOL/L (ref 21–31)
CREAT SERPL-MCNC: 1.15 MG/DL (ref 0.6–1.2)
EOSINOPHIL # BLD AUTO: 0.1 THOUSAND/ΜL (ref 0–0.61)
EOSINOPHIL NFR BLD AUTO: 1 % (ref 0–5)
ERYTHROCYTE [DISTWIDTH] IN BLOOD BY AUTOMATED COUNT: 16.5 % (ref 11.5–14.5)
GFR SERPL CREATININE-BSD FRML MDRD: 43 ML/MIN/1.73SQ M
GLUCOSE SERPL-MCNC: 96 MG/DL (ref 65–99)
GRAM STN SPEC: NORMAL
GRAM STN SPEC: NORMAL
HCT VFR BLD AUTO: 27.4 % (ref 34.8–46.1)
HGB BLD-MCNC: 9.3 G/DL (ref 12–16)
LYMPHOCYTES # BLD AUTO: 2 THOUSANDS/ΜL (ref 0.6–4.47)
LYMPHOCYTES NFR BLD AUTO: 30 % (ref 21–51)
MCH RBC QN AUTO: 31.3 PG (ref 26–34)
MCHC RBC AUTO-ENTMCNC: 33.9 G/DL (ref 31–37)
MCV RBC AUTO: 92 FL (ref 81–99)
MONOCYTES # BLD AUTO: 0.8 THOUSAND/ΜL (ref 0.17–1.22)
MONOCYTES NFR BLD AUTO: 12 % (ref 2–12)
NEUTROPHILS # BLD AUTO: 3.7 THOUSANDS/ΜL (ref 1.4–6.5)
NEUTS SEG NFR BLD AUTO: 56 % (ref 42–75)
NRBC BLD AUTO-RTO: 0 /100 WBCS
PLATELET # BLD AUTO: 223 THOUSANDS/UL (ref 149–390)
PMV BLD AUTO: 8.7 FL (ref 8.6–11.7)
POTASSIUM SERPL-SCNC: 3.8 MMOL/L (ref 3.5–5.5)
RBC # BLD AUTO: 2.97 MILLION/UL (ref 3.9–5.2)
SODIUM SERPL-SCNC: 145 MMOL/L (ref 134–143)
WBC # BLD AUTO: 6.5 THOUSAND/UL (ref 4.8–10.8)

## 2018-08-27 PROCEDURE — 80048 BASIC METABOLIC PNL TOTAL CA: CPT | Performed by: HOSPITALIST

## 2018-08-27 PROCEDURE — 99232 SBSQ HOSP IP/OBS MODERATE 35: CPT | Performed by: HOSPITALIST

## 2018-08-27 PROCEDURE — 97110 THERAPEUTIC EXERCISES: CPT

## 2018-08-27 PROCEDURE — 85025 COMPLETE CBC W/AUTO DIFF WBC: CPT | Performed by: HOSPITALIST

## 2018-08-27 RX ADMIN — ASPIRIN 81 MG: 81 TABLET, COATED ORAL at 09:30

## 2018-08-27 RX ADMIN — SILVER SULFADIAZINE: 10 CREAM TOPICAL at 09:31

## 2018-08-27 RX ADMIN — SILVER SULFADIAZINE: 10 CREAM TOPICAL at 18:48

## 2018-08-27 RX ADMIN — LISINOPRIL 5 MG: 5 TABLET ORAL at 09:30

## 2018-08-27 RX ADMIN — HEPARIN SODIUM 5000 UNITS: 5000 INJECTION INTRAVENOUS; SUBCUTANEOUS at 21:13

## 2018-08-27 RX ADMIN — LEVOFLOXACIN 500 MG: 5 INJECTION, SOLUTION INTRAVENOUS at 05:49

## 2018-08-27 RX ADMIN — Medication: at 18:48

## 2018-08-27 RX ADMIN — CLOTRIMAZOLE: 10 CREAM TOPICAL at 09:31

## 2018-08-27 RX ADMIN — Medication: at 09:31

## 2018-08-27 RX ADMIN — LEVOTHYROXINE SODIUM 75 MCG: 75 TABLET ORAL at 05:49

## 2018-08-27 RX ADMIN — HEPARIN SODIUM 5000 UNITS: 5000 INJECTION INTRAVENOUS; SUBCUTANEOUS at 05:49

## 2018-08-27 RX ADMIN — CLOTRIMAZOLE: 10 CREAM TOPICAL at 18:48

## 2018-08-27 NOTE — MALNUTRITION/BMI
This medical record reflects one or more clinical indicators suggestive of malnutrition and/or morbid obesity  Malnutrition Findings:   Malnutrition type: Acute illness  Degree of Malnutrition: Malnutrition of mild degree  Malnutrition Characteristics: Inadequate energy, Weight loss, Fluid accumulation (Meal intakes <50%; 12#/8 2% wt loss in 1 5mo; +1 b/l edema)    BMI Findings:  BMI Classifications:  (BMI=20 83)     Body mass index is 20 83 kg/m²  See Nutrition note dated 08/27/18 for additional details  Completed nutrition assessment is viewable in the nutrition documentation

## 2018-08-27 NOTE — PLAN OF CARE
Problem: PHYSICAL THERAPY ADULT  Goal: Performs mobility at highest level of function for planned discharge setting  See evaluation for individualized goals  Treatment/Interventions: ADL retraining, Functional transfer training, LE strengthening/ROM, Therapeutic exercise, Endurance training, Cognitive reorientation, Patient/family training, Bed mobility, Gait training, Equipment eval/education, Compensatory technique education, Spoke to nursing  Equipment Recommended: Dg Wheat, PT         See flowsheet documentation for full assessment, interventions and recommendations  Outcome: Progressing  Prognosis: Fair  Problem List: Decreased strength, Decreased endurance, Impaired balance, Decreased mobility, Decreased coordination, Impaired judgement, Decreased safety awareness, Impaired hearing, Decreased skin integrity, Pain  Assessment: Pt in bed & agreed to LE ther ex  She performed ex as noted within her tolerance  She became fatigued with ex but followed directions & participated as able  She would benefit from cont'd PT to increase strength, endurance & safe functional mobility  Recommendation: Short-term skilled PT     PT - OK to Discharge: No    See flowsheet documentation for full assessment     Iris Gonzales, PTA

## 2018-08-27 NOTE — PROGRESS NOTES
Progress Note - Jamar Ho 5/24/1931, 80 y o  female MRN: [id number]    Unit/Bed#: -01 Encounter: [id number]    Primary Care Provider: Mylene Beatty DO   Date and time admitted to hospital: 8/25/2018  4:36 AM        Toxic encephalopathy   Assessment & Plan    · Has completely resolved  · This is secondary to the Proteus mirabilis UTI         * Acute cystitis without hematuria   Assessment & Plan    · Secondary to Proteus mirabilis UTI  · Continue IV Levaquin today is day 3  · This was a simple UTI -today's the last day of antibiotics  · From a disposition standpoint -await case management input in regards to placement        Non-traumatic rhabdomyolysis   Assessment & Plan    · Resolved  · Status post IV fluid treatment          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, stage 2   Assessment & Plan    · Patient is status post a wound care evaluation  · Continue supportive therapy  · Continue local wound care  · This was present on admission         Hypothyroidism due to acquired atrophy of thyroid   Assessment & Plan    · Continue levothyroxine        Essential hypertension   Assessment & Plan    · Continue present blood pressure medications         Elevated troponin   Assessment & Plan    · This is a non ST segment elevation MI type 2   · Troponins are equivocal they are not climbing or dropping -cardiology evaluation is pending  · Continue present meds        CKD (chronic kidney disease) stage 3, GFR 30-59 ml/min   Assessment & Plan    · Creatinine stable   · Mild hypernatremia noted  · Most likely secondary to the IV fluids that she received  · DC IV fluids  · Monitor sodium           Mild protein-calorie malnutrition (HCC)   Assessment & Plan    Malnutrition Findings:   Malnutrition type: Acute illness  Degree of Malnutrition: Malnutrition of mild degree    BMI Findings:  BMI Classifications:  (BMI=20 83)     Body mass index is 20 83 kg/m²     · Continue supportive therapy   · Appreciate dietary/nutrition input            VTE Pharmacologic Prophylaxis: Pharmacologic: Heparin    Patient Centered Rounds: I have performed bedside rounds with nursing staff today  Discussions with Specialists or Other Care Team Provider:  Case Management  Education and Discussions with Family / Patient:  Patient    Time Spent for Care: 20 minutes  More than 50% of total time spent on counseling and coordination of care as described above  Current Length of Stay: 2 day(s)    Current Patient Status: Inpatient   Certification Statement: The patient will continue to require additional inpatient hospital stay due to The pending placement issues with case management    Discharge Plan:   Patient is medically stable for discharge -await final disposition from case management    Code Status: Level 3 - DNAR and DNI    Subjective:   Patient seen and examined  No complaints no distress    Objective:     Vitals:   Temp (24hrs), Av 4 °F (37 4 °C), Min:98 2 °F (36 8 °C), Max:100 1 °F (37 8 °C)    HR:  [83-91] 91  Resp:  [20] 20  BP: (130-182)/(69-82) 182/82  SpO2:  [92 %-93 %] 93 %  Body mass index is 20 83 kg/m²  Input and Output Summary (last 24 hours): Intake/Output Summary (Last 24 hours) at 18 1344  Last data filed at 18 1206   Gross per 24 hour   Intake              265 ml   Output                0 ml   Net              265 ml       Physical Exam:     Physical Exam   Constitutional: She is oriented to person, place, and time  She appears well-developed and well-nourished  HENT:   Head: Normocephalic and atraumatic  Nose: Nose normal    Mouth/Throat: Oropharynx is clear and moist    Eyes: Conjunctivae and EOM are normal  Pupils are equal, round, and reactive to light  Neck: Normal range of motion  Neck supple  No JVD present  No thyromegaly present  Cardiovascular: Normal rate, regular rhythm and intact distal pulses    Exam reveals no gallop and no friction rub  No murmur heard  Pulmonary/Chest: Effort normal and breath sounds normal  No respiratory distress  Abdominal: Soft  Bowel sounds are normal  She exhibits no distension and no mass  There is no tenderness  There is no guarding  Musculoskeletal: Normal range of motion  She exhibits no edema  Lymphadenopathy:     She has no cervical adenopathy  Neurological: She is alert and oriented to person, place, and time  No cranial nerve deficit  Skin: Skin is warm  No rash noted  No erythema  Psychiatric: She has a normal mood and affect  Her behavior is normal    Nursing note and vitals reviewed  Additional Data:     Labs:      Results from last 7 days  Lab Units 08/27/18  0508   WBC Thousand/uL 6 50   HEMOGLOBIN g/dL 9 3*   HEMATOCRIT % 27 4*   PLATELETS Thousands/uL 223   NEUTROS PCT % 56   LYMPHS PCT % 30   MONOS PCT % 12   EOS PCT % 1       Results from last 7 days  Lab Units 08/27/18  0508  08/25/18  0107   SODIUM mmol/L 145*  < > 140   POTASSIUM mmol/L 3 8  < > 4 4   CHLORIDE mmol/L 113*  < > 103   CO2 mmol/L 24  < > 24   BUN mg/dL 23  < > 30*   CREATININE mg/dL 1 15  < > 1 15   CALCIUM mg/dL 8 5*  < > 9 6   ALK PHOS U/L  --   --  145   ALT U/L  --   --  19   AST U/L  --   --  51*   < > = values in this interval not displayed  Results from last 7 days  Lab Units 08/21/18  2201   INR  1 09               * I Have Reviewed All Lab Data Listed Above  * Additional Pertinent Lab Tests Reviewed: [facility] Admission  Reviewed    Imaging:  Imaging Reports Reviewed Today Include:  None    Recent Cultures (last 7 days):       Results from last 7 days  Lab Units 08/25/18  1347 08/25/18  0200 08/21/18  2256 08/21/18  2201   BLOOD CULTURE   --   --   --  No Growth After 5 Days  No Growth After 5 Days     GRAM STAIN RESULT  Rare Polys  No bacteria seen  --   --   --    URINE CULTURE   --  90,000-99,000 cfu/ml Proteus mirabilis* >100,000 cfu/ml Proteus mirabilis*  --    WOUND CULTURE  1+ Growth of   --   --   --        Last 24 Hours Medication List:     Current Facility-Administered Medications:  ammonium lactate  Topical BID Alonzo Essex, MD    aspirin 81 mg Oral Daily Na Larry PA-C    clotrimazole  Topical BID Na Larry PA-C    heparin (porcine) 5,000 Units Subcutaneous [facility] Na Larry PA-C    levofloxacin 500 mg Intravenous Q48H Na Larry PA-C Last Rate: 500 mg (08/27/18 0549)   levothyroxine 75 mcg Oral Early Morning Na Larry PA-C    lisinopril 5 mg Oral Daily Na Larry PA-C    silver sulfadiazine  Topical BID Alonzo Essex, MD         Today, Patient Was Seen By: Samantha Overton MD    ** Please Note: Dictation voice to text software may have been used in the creation of this document   **

## 2018-08-27 NOTE — PLAN OF CARE
Problem: Nutrition/Hydration-ADULT  Goal: Nutrient/Hydration intake appropriate for improving, restoring or maintaining nutritional needs  Monitor and assess patient's nutrition/hydration status for malnutrition (ex- brittle hair, bruises, dry skin, pale skin and conjunctiva, muscle wasting, smooth red tongue, and disorientation)  Collaborate with interdisciplinary team and initiate plan and interventions as ordered  Monitor patient's weight and dietary intake as ordered or per policy  Utilize nutrition screening tool and intervene per policy  Determine patient's food preferences and provide high-protein, high-caloric foods as appropriate  INTERVENTIONS:  - Monitor oral intake, urinary output, labs, and treatment plans  - Assess nutrition and hydration status and recommend course of action  - Evaluate amount of meals eaten  - Assist patient with eating if necessary   - Allow adequate time for meals  - Recommend/ encourage appropriate diets, oral nutritional supplements, and vitamin/mineral supplements  - Order, calculate, and assess calorie counts as needed  - Recommend, monitor, and adjust tube feedings and TPN/PPN based on assessed needs  - Assess need for intravenous fluids  - Provide specific nutrition/hydration education as appropriate  - Include patient/family/caregiver in decisions related to nutrition  Outcome: Progressing  Goal: Pt will consume >65% of meals and supplements and avoid wt loss by next review

## 2018-08-27 NOTE — PHYSICAL THERAPY NOTE
08/27/18 1129   Pain Assessment   Pain Assessment 0-10   Pain Score 7   Pain Location Hip; Shoulder   Pain Orientation Left   Pain Descriptors Aching;Discomfort   Pain Frequency Intermittent   Pain Onset Ongoing   Clinical Progression Not changed   Restrictions/Precautions   Weight Bearing Precautions Per Order No   Other Precautions Contact/isolation   General   Family/Caregiver Present No   Cognition   Overall Cognitive Status Impaired   Arousal/Participation Alert; Cooperative   Attention Attends with cues to redirect   Orientation Level Oriented to person;Oriented to place;Oriented to situation   Memory Decreased short term memory   Following Commands Follows one step commands with increased time or repetition   Subjective   Subjective "I fell at home & laid a long time until I was found  I guess that shows I shouldn't live alone " Pt c/o pain L hip & shoulder s/p fall at home   Endurance Deficit   Endurance Deficit Yes   Activity Tolerance   Activity Tolerance Patient limited by fatigue;Patient limited by pain   Exercises   Quad Sets Supine;20 reps;Bilateral   Heelslides Supine;20 reps;Bilateral   Hip Abduction Supine;20 reps;Bilateral   Ankle Pumps Supine;20 reps;Bilateral   Assessment   Prognosis Fair   Problem List Decreased strength;Decreased endurance; Impaired balance;Decreased mobility; Decreased coordination; Impaired judgement;Decreased safety awareness; Impaired hearing;Decreased skin integrity;Pain   Assessment Pt in bed & agreed to LE ther ex  She performed ex as noted within her tolerance  She became fatigued with ex but followed directions & participated as able  She would benefit from cont'd PT to increase strength, endurance & safe functional mobility  Goals   Patient Goals to have less pain   Treatment Day 1   Plan   Treatment/Interventions Functional transfer training;LE strengthening/ROM; Therapeutic exercise; Endurance training;Cognitive reorientation;Patient/family training; Bed mobility; Equipment eval/education;Gait training   Progress Slow progress, decreased activity tolerance   PT Frequency 5x/wk   Recommendation   Recommendation Short-term skilled PT   Equipment Recommended Walker   PT - OK to Discharge Susan Mondragon, PTA

## 2018-08-27 NOTE — PLAN OF CARE
Problem: PAIN - ADULT  Goal: Verbalizes/displays adequate comfort level or baseline comfort level  Interventions:  - Encourage patient to monitor pain and request assistance  - Assess pain using appropriate pain scale  - Administer analgesics based on type and severity of pain and evaluate response  - Implement non-pharmacological measures as appropriate and evaluate response  - Consider cultural and social influences on pain and pain management  - Notify physician/advanced practitioner if interventions unsuccessful or patient reports new pain   Outcome: Progressing  Being  Medicated  For  Pain  Based  On  scale

## 2018-08-27 NOTE — PLAN OF CARE

## 2018-08-27 NOTE — PLAN OF CARE
Problem: OCCUPATIONAL THERAPY ADULT  Goal: Performs self-care activities at highest level of function for planned discharge setting  See evaluation for individualized goals  Treatment Interventions: ADL retraining, UE strengthening/ROM, Cognitive reorientation, Patient/family training          See flowsheet documentation for full assessment, interventions and recommendations  Outcome: Progressing  Limitation: Decreased ADL status, Decreased UE ROM, Decreased UE strength, Decreased Safe judgement during ADL, Decreased cognition, Decreased endurance     Assessment: Pt  Is currently concerned about recently discovered wound/infection of Left shoulder  Has pain associated with same  Did participate in light UE exer  As detailed on flow sheet  She reports that she needs "a place to live" - cognizant of her limited ability to care for self  POC remains appropriate; cont  With same as pt  Able         PA Vincent/CHRIS

## 2018-08-27 NOTE — PLAN OF CARE
Problem: Prexisting or High Potential for Compromised Skin Integrity  Goal: Skin integrity is maintained or improved  INTERVENTIONS:  - Identify patients at risk for skin breakdown  - Assess and monitor skin integrity  - Assess and monitor nutrition and hydration status  - Monitor labs (i e  albumin)  - Assess for incontinence   - Turn and reposition patient  - Assist with mobility/ambulation  - Relieve pressure over bony prominences  - Avoid friction and shearing  - Provide appropriate hygiene as needed including keeping skin clean and dry  - Evaluate need for skin moisturizer/barrier cream  - Collaborate with interdisciplinary team (i e  Nutrition, Rehabilitation, etc )   - Patient/family teaching   Outcome: Progressing  Providing  And  Keeping  All  Dressing  Clean  Dry  And  Intact

## 2018-08-27 NOTE — ASSESSMENT & PLAN NOTE
· Patient is status post a wound care evaluation  · Continue supportive therapy  · Continue local wound care  · This was present on admission

## 2018-08-27 NOTE — PLAN OF CARE
Problem: DISCHARGE PLANNING - CARE MANAGEMENT  Goal: Discharge to post-acute care or home with appropriate resources  INTERVENTIONS:  - Conduct assessment to determine patient/family and health care team treatment goals, and need for post-acute services based on payer coverage, community resources, and patient preferences, and barriers to discharge  - Address psychosocial, clinical, and financial barriers to discharge as identified in assessment in conjunction with the patient/family and health care team  - Arrange appropriate level of post-acute services according to patient's   needs and preference and payer coverage in collaboration with the physician and health care team  - Communicate with and update the patient/family, physician, and health care team regarding progress on the discharge plan  - Arrange appropriate transportation to post-acute venues  Referrals made to [facility] and [facility]   Outcome: Not Progressing

## 2018-08-27 NOTE — SOCIAL WORK
Evaluated them pt at the bedside  Pt states she lives alone in a 2 story home  Has 15 steps outside  Pt indicated she stays on the first floor of then home  Pt states she is WC bound  Has walker, cane and BSC  Pt has multiple wounds on BLE  Pt is alone and states her spouse is in [facility]  Discussed SNF with the pt  Pt states she would prefer to go to [facility] or [facility]  Will make referrals to [facility]  Spoke to the pt son Harriet Christopher who indicated he is the POA  Son is agreeable to [facility] or [facility] and does not want a referral made to [facility] where the spouse is  Will continue to follow  CM reviewed d/c planning process including the following: identifying help at home, patient preference for d/c planning needs, availability of treatment team to discuss questions or concerns patient and/or family may have regarding understanding medications and recognizing signs and symptoms once discharged  CM also encouraged patient to follow up with all recommended appointments after discharge  Patient advised of importance for patient and family to participate in managing patients medical well being

## 2018-08-27 NOTE — OCCUPATIONAL THERAPY NOTE
08/27/18 1328   Restrictions/Precautions   Weight Bearing Precautions Per Order No   Other Precautions Contact/isolation   Pain Assessment   Pain Assessment 0-10   Pain Score 8   Pain Location Shoulder   Pain Orientation Left;Posterior   Effect of Pain on Daily Activities limiting movement/ROM at this time   Hospital Pain Intervention(s) Declines   Response to Interventions states "I don't like to take meds  but I will if it gets worse  "   ADL   Where Assessed Other (Comment)  (pt  declined self-care)   ROM- Right Upper Extremities   R Shoulder AROM; Horizontal ABduction  (ALSO AAROM pro/retraction)   R Weight/Reps/Sets AROM - 15 reps; AAROM - 10 reps, 2 sets   ROM - Left Upper Extremities    LUE ROM Comment declined secondary to pain   Right Upper Extremity- Strength   R Elbow Elbow flexion;Elbow extension  (supin/pronation)   R Weight/Reps/Sets flex/ext Actively - 20 reps in forward And reverse; supin/pronation with 1 pound weight - 15 reps   Left Upper Extremity-Strength   L Elbow Elbow flexion;Elbow extension  (supin/pronation)   L Weights/Reps/Sets flex/ext Actively - 20 reps in forward And reverse; supin/pronation with 1 pound weight - 15 reps   Coordination   Gross Motor impaired by ROM  limitations and pain   Cognition   Overall Cognitive Status Impaired   Arousal/Participation Alert; Cooperative   Following Commands Follows one step commands with increased time or repetition   Activity Tolerance   Activity Tolerance Patient limited by pain   Assessment   Assessment Pt  Is currently concerned about recently discovered wound/infection of Left shoulder  Has pain associated with same  Did participate in light UE exer  As detailed on flow sheet  She reports that she needs "a place to live" - cognizant of her limited ability to care for self  POC remains appropriate; cont  With same as pt  Able  Plan   Treatment Interventions UE strengthening/ROM; Activityengagement   Goal Expiration Date 09/04/18 Treatment Day 172 PA Shirley/CHRIS

## 2018-08-27 NOTE — ASSESSMENT & PLAN NOTE
· Creatinine stable   · Mild hypernatremia noted  · Most likely secondary to the IV fluids that she received  · DC IV fluids  · Monitor sodium

## 2018-08-27 NOTE — ASSESSMENT & PLAN NOTE
Malnutrition Findings:   Malnutrition type: Acute illness  Degree of Malnutrition: Malnutrition of mild degree    BMI Findings:  BMI Classifications:  (BMI=20 83)     Body mass index is 20 83 kg/m²     · Continue supportive therapy   · Appreciate dietary/nutrition input

## 2018-08-27 NOTE — ASSESSMENT & PLAN NOTE
· Secondary to Proteus mirabilis UTI  · Continue IV Levaquin today is day 3  · This was a simple UTI -today's the last day of antibiotics  · From a disposition standpoint -await case management input in regards to placement

## 2018-08-28 ENCOUNTER — HOSPITAL ENCOUNTER (INPATIENT)
Facility: HOSPITAL | Age: 83
LOS: 2 YEARS days | DRG: 389 | End: 2020-11-30
Attending: INTERNAL MEDICINE | Admitting: INTERNAL MEDICINE
Payer: MEDICARE

## 2018-08-28 VITALS
HEART RATE: 88 BPM | RESPIRATION RATE: 18 BRPM | SYSTOLIC BLOOD PRESSURE: 194 MMHG | WEIGHT: 133 LBS | HEIGHT: 67 IN | TEMPERATURE: 100.6 F | DIASTOLIC BLOOD PRESSURE: 93 MMHG | BODY MASS INDEX: 20.88 KG/M2 | OXYGEN SATURATION: 91 %

## 2018-08-28 DIAGNOSIS — I10 ESSENTIAL HYPERTENSION: ICD-10-CM

## 2018-08-28 DIAGNOSIS — R05.9 COUGH: ICD-10-CM

## 2018-08-28 DIAGNOSIS — R30.0 DYSURIA: ICD-10-CM

## 2018-08-28 DIAGNOSIS — M25.512 LEFT SHOULDER PAIN, UNSPECIFIED CHRONICITY: ICD-10-CM

## 2018-08-28 DIAGNOSIS — L89.42 PRESSURE ULCER OF CONTIGUOUS REGION INVOLVING BACK AND LEFT BUTTOCK, STAGE 2 (HCC): ICD-10-CM

## 2018-08-28 DIAGNOSIS — I48.91 ATRIAL FIBRILLATION WITH RAPID VENTRICULAR RESPONSE (HCC): ICD-10-CM

## 2018-08-28 DIAGNOSIS — N18.30 CKD (CHRONIC KIDNEY DISEASE) STAGE 3, GFR 30-59 ML/MIN (HCC): Chronic | ICD-10-CM

## 2018-08-28 DIAGNOSIS — E44.1 MILD PROTEIN-CALORIE MALNUTRITION (HCC): ICD-10-CM

## 2018-08-28 DIAGNOSIS — D50.8 OTHER IRON DEFICIENCY ANEMIA: ICD-10-CM

## 2018-08-28 DIAGNOSIS — T07.XXXA WOUNDS, MULTIPLE: ICD-10-CM

## 2018-08-28 DIAGNOSIS — E03.4 HYPOTHYROIDISM DUE TO ACQUIRED ATROPHY OF THYROID: Chronic | ICD-10-CM

## 2018-08-28 DIAGNOSIS — R60.9 EDEMA, UNSPECIFIED TYPE: ICD-10-CM

## 2018-08-28 DIAGNOSIS — Z20.822 COVID-19 RULED OUT: ICD-10-CM

## 2018-08-28 DIAGNOSIS — D64.9 ANEMIA, UNSPECIFIED TYPE: Primary | ICD-10-CM

## 2018-08-28 DIAGNOSIS — E03.4 ATROPHY OF THYROID: ICD-10-CM

## 2018-08-28 DIAGNOSIS — R11.0 NAUSEA: ICD-10-CM

## 2018-08-28 DIAGNOSIS — R23.4 WOUND ESCHAR OF FOOT: ICD-10-CM

## 2018-08-28 DIAGNOSIS — J20.9 ACUTE BRONCHITIS, UNSPECIFIED ORGANISM: ICD-10-CM

## 2018-08-28 LAB
ANION GAP SERPL CALCULATED.3IONS-SCNC: 9 MMOL/L (ref 4–13)
BASOPHILS # BLD AUTO: 0 THOUSANDS/ΜL (ref 0–0.1)
BASOPHILS NFR BLD AUTO: 1 % (ref 0–2)
BUN SERPL-MCNC: 19 MG/DL (ref 7–25)
CALCIUM SERPL-MCNC: 8.5 MG/DL (ref 8.6–10.5)
CHLORIDE SERPL-SCNC: 111 MMOL/L (ref 98–107)
CO2 SERPL-SCNC: 24 MMOL/L (ref 21–31)
CREAT SERPL-MCNC: 1.1 MG/DL (ref 0.6–1.2)
EOSINOPHIL # BLD AUTO: 0.2 THOUSAND/ΜL (ref 0–0.61)
EOSINOPHIL NFR BLD AUTO: 3 % (ref 0–5)
ERYTHROCYTE [DISTWIDTH] IN BLOOD BY AUTOMATED COUNT: 16.1 % (ref 11.5–14.5)
GFR SERPL CREATININE-BSD FRML MDRD: 45 ML/MIN/1.73SQ M
GLUCOSE SERPL-MCNC: 102 MG/DL (ref 65–99)
HCT VFR BLD AUTO: 30.3 % (ref 34.8–46.1)
HGB BLD-MCNC: 9.9 G/DL (ref 12–16)
LYMPHOCYTES # BLD AUTO: 2 THOUSANDS/ΜL (ref 0.6–4.47)
LYMPHOCYTES NFR BLD AUTO: 28 % (ref 21–51)
MCH RBC QN AUTO: 28.8 PG (ref 26–34)
MCHC RBC AUTO-ENTMCNC: 32.7 G/DL (ref 31–37)
MCV RBC AUTO: 88 FL (ref 81–99)
MONOCYTES # BLD AUTO: 0.9 THOUSAND/ΜL (ref 0.17–1.22)
MONOCYTES NFR BLD AUTO: 12 % (ref 2–12)
NEUTROPHILS # BLD AUTO: 4.1 THOUSANDS/ΜL (ref 1.4–6.5)
NEUTS SEG NFR BLD AUTO: 56 % (ref 42–75)
NRBC BLD AUTO-RTO: 0 /100 WBCS
PLATELET # BLD AUTO: 244 THOUSANDS/UL (ref 149–390)
PMV BLD AUTO: 9 FL (ref 8.6–11.7)
POTASSIUM SERPL-SCNC: 3.8 MMOL/L (ref 3.5–5.5)
RBC # BLD AUTO: 3.44 MILLION/UL (ref 3.9–5.2)
SODIUM SERPL-SCNC: 144 MMOL/L (ref 134–143)
WBC # BLD AUTO: 7.2 THOUSAND/UL (ref 4.8–10.8)

## 2018-08-28 PROCEDURE — 85025 COMPLETE CBC W/AUTO DIFF WBC: CPT | Performed by: HOSPITALIST

## 2018-08-28 PROCEDURE — 80048 BASIC METABOLIC PNL TOTAL CA: CPT | Performed by: HOSPITALIST

## 2018-08-28 PROCEDURE — 99239 HOSP IP/OBS DSCHRG MGMT >30: CPT | Performed by: NURSE PRACTITIONER

## 2018-08-28 RX ORDER — LISINOPRIL 5 MG/1
5 TABLET ORAL ONCE
Status: COMPLETED | OUTPATIENT
Start: 2018-08-28 | End: 2018-08-28

## 2018-08-28 RX ORDER — AMMONIUM LACTATE 12 G/100G
LOTION TOPICAL 2 TIMES DAILY
Status: CANCELLED | OUTPATIENT
Start: 2018-08-28

## 2018-08-28 RX ORDER — LISINOPRIL 10 MG/1
10 TABLET ORAL DAILY
Status: DISCONTINUED | OUTPATIENT
Start: 2018-08-29 | End: 2018-08-28 | Stop reason: HOSPADM

## 2018-08-28 RX ORDER — LISINOPRIL 10 MG/1
10 TABLET ORAL DAILY
Status: CANCELLED | OUTPATIENT
Start: 2018-08-29

## 2018-08-28 RX ORDER — FUROSEMIDE 40 MG/1
40 TABLET ORAL DAILY
Qty: 30 TABLET | Refills: 0 | Status: SHIPPED | OUTPATIENT
Start: 2018-08-28 | End: 2019-07-30 | Stop reason: HOSPADM

## 2018-08-28 RX ORDER — HYDRALAZINE HYDROCHLORIDE 20 MG/ML
5 INJECTION INTRAMUSCULAR; INTRAVENOUS EVERY 6 HOURS PRN
Status: DISCONTINUED | OUTPATIENT
Start: 2018-08-28 | End: 2018-08-28 | Stop reason: HOSPADM

## 2018-08-28 RX ORDER — LEVOTHYROXINE SODIUM 0.07 MG/1
75 TABLET ORAL
Status: CANCELLED | OUTPATIENT
Start: 2018-08-29

## 2018-08-28 RX ORDER — ASPIRIN 81 MG/1
81 TABLET ORAL DAILY
Status: CANCELLED | OUTPATIENT
Start: 2018-08-29

## 2018-08-28 RX ORDER — LEVOFLOXACIN 500 MG/1
500 TABLET, FILM COATED ORAL EVERY 24 HOURS
Status: DISCONTINUED | OUTPATIENT
Start: 2018-08-29 | End: 2018-08-28 | Stop reason: HOSPADM

## 2018-08-28 RX ORDER — CLOTRIMAZOLE 1 %
CREAM (GRAM) TOPICAL 2 TIMES DAILY
Status: CANCELLED | OUTPATIENT
Start: 2018-08-28

## 2018-08-28 RX ORDER — LEVOFLOXACIN 500 MG/1
500 TABLET, FILM COATED ORAL EVERY 24 HOURS
Status: CANCELLED | OUTPATIENT
Start: 2018-08-29 | End: 2018-08-30

## 2018-08-28 RX ADMIN — Medication: at 08:56

## 2018-08-28 RX ADMIN — ASPIRIN 81 MG: 81 TABLET, COATED ORAL at 08:56

## 2018-08-28 RX ADMIN — LISINOPRIL 5 MG: 5 TABLET ORAL at 08:56

## 2018-08-28 RX ADMIN — LISINOPRIL 5 MG: 5 TABLET ORAL at 10:28

## 2018-08-28 RX ADMIN — CLOTRIMAZOLE: 10 CREAM TOPICAL at 08:56

## 2018-08-28 RX ADMIN — LEVOTHYROXINE SODIUM 75 MCG: 75 TABLET ORAL at 05:35

## 2018-08-28 RX ADMIN — HEPARIN SODIUM 5000 UNITS: 5000 INJECTION INTRAVENOUS; SUBCUTANEOUS at 05:35

## 2018-08-28 RX ADMIN — SILVER SULFADIAZINE: 10 CREAM TOPICAL at 08:56

## 2018-08-28 NOTE — NURSING NOTE
Patient belongings returned, IV discontinued without incident, AVS reviewed, Transfer Summary set with patient, no questions at this time

## 2018-08-28 NOTE — DISCHARGE SUMMARY
Discharge- Yuliana Kurtz 5/24/1931, 80 y o  female MRN: [id number]    Unit/Bed#: -01 Encounter: [id number]    Primary Care Provider: Yamilet Rdz DO   Date and time admitted to hospital: 8/25/2018  4:36 AM        * Acute cystitis without hematuria   Assessment & Plan    · Secondary to Proteus mirabilis UTI  · Patient completed 3-day course of Levaquin          Toxic encephalopathy   Assessment & Plan    · Has completely resolved  · This is secondary to the Proteus mirabilis UTI         Elevated troponin   Assessment & Plan    · This is a non ST segment elevation MI type 2 secondary to acute infection   · Troponins are equivocal they are not climbing or dropping  · Cardiology does not recommend any intervention at this time as patient is completely asymptomatic  · Continue current medical management  Mild protein-calorie malnutrition ([address] )   Assessment & Plan    Malnutrition Findings:   Malnutrition type: Acute illness  Degree of Malnutrition: Malnutrition of mild degree    BMI Findings:  BMI Classifications:  (BMI=20 83)     Body mass index is 20 83 kg/m²  · Continue supportive therapy   · Appreciate dietary/nutrition input        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, stage 2   Assessment & Plan    · Patient is status post a wound care evaluation  · Continue supportive therapy  · Continue local wound care  · This was present on admission         Hypothyroidism due to acquired atrophy of thyroid   Assessment & Plan    · Continue levothyroxine        CKD (chronic kidney disease) stage 3, GFR 30-59 ml/min   Assessment & Plan    · Creatinine stable   · Mild hypernatremia noted  · Most likely secondary to the IV fluids that she received  · IVF was discontinue     · Will encourage fluid intake           Essential hypertension   Assessment & Plan    · Continue present blood pressure medications         Non-traumatic rhabdomyolysis   Assessment & Plan · Resolved                  Discharging Physician / Practitioner: PAOLO Patel  PCP: Naseem Licona DO  Admission Date:   Admission Orders     Ordered        08/25/18 0453  Inpatient Admission  Once             Discharge Date: 08/28/18    Disposition:      Short Term Rehab or SNF at the [address] [place] [address] to [address]:   · Not Applicable to this Patient - Not Applicable to this Patient    Reason for Admission: fall and confusion     Discharge Diagnoses:     Please see assessment and plan section above for further details regarding discharge diagnoses  Resolved Problems  Date Reviewed: 8/28/2018    None          Consultations During Hospital Stay:  · Wound care     Procedures Performed:   ·  none      Medication Adjustments and Discharge Medications:  · Summary of Medication Adjustments made as a result of this hospitalization:  Lisinopril increased from 5 mg to 10 mg daily  · Medication Dosing Tapers - Please refer to Discharge Medication List for details on any medication dosing tapers (if applicable to patient)  · Medications being temporarily held (include recommended restart time):  None   · Discharge Medication List: See after visit summary for reconciled discharge medications  Wound Care Recommendations:  When applicable, please see wound care section of After Visit Summary  Diet Recommendations at Discharge:  Diet -        Diet Orders            Start     Ordered    08/27/18 1235  Diet Regular; Regular House; Dysphagia 3-Dental Soft; Thin Liquid  Diet effective now     Comments:  Ensure pudding any flavor at breakfast & lunch; magic cup at dinner  Question Answer Comment   Diet Type Regular    Regular Regular House    Other Restriction(s): Dysphagia 3-Dental Soft    Liquid Modifier Thin Liquid    RD to adjust diet per protocol?  Yes        08/27/18 1234    08/27/18 1234  Dietary nutrition supplements  Once     Comments:  Ensure pudding any flavor at breakfast & lunch; magic cup at dinner  Question Answer Comment   Select Supplement: Ensure Pudding-Chocolate    Select Supplement: Ensure Pudding-Vanila    Frequency Breakfast, Lunch        08/27/18 1234          Significant Findings / Test Results:     No orders to display     · Proteus mirabilis UTI    Incidental Findings:   · Hypernatremia- secondary IVF  Test Results Pending at Discharge (will require follow up): ·  None      Outpatient Tests Requested:  ·  None     Complications:    ·  None     Hospital Course:     Qing Raphael is a 80 y o  female patient who originally presented to the hospital on 8/25/2018 due to multiple falls  The patient was found to have confusion and was diagnosed with UTI  Was noted as well that the family reported visual hallucinations of flying cats  The patient was anorexia, was having multiple bouts of diarrhea from antibiotics that was prescribed prior  She was subsequently admitted for toxic metabolic encephalopathy secondary to Proteus mirabilis UTI  She was started on IV Levaquin and need 1 more dose of Levaquin 500 mg PO on 8/29/2018 to finish a 3-day course  She was noted to have elevated troponin which is equivocal with no raise or fall  Patient is asymptomatic and there is no ischemic changes on EKG  Lisinopril was increased for elevated BP  She is medically cleared to be discharged to the [place] skilled nursing facility  Condition at Discharge: fair     Discharge Day Visit / Exam:     Subjective:  Feeling okay  Denies any pain  Vitals: Blood Pressure: (!) 194/93 (08/28/18 0734)  Pulse: 88 (08/28/18 0734)  Temperature: (!) 100 6 °F (38 1 °C) (08/28/18 0734)  Temp Source: Tympanic (08/28/18 0734)  Respirations: 18 (08/28/18 0734)  Height: 5' 7" (170 2 cm) (08/27/18 1158)  Weight - Scale: 60 3 kg (133 lb) (08/27/18 1157)  SpO2: 91 % (08/28/18 0734)  Exam:   Physical Exam   Constitutional: She appears well-developed and well-nourished     HENT:   Head: Normocephalic and atraumatic  Mouth/Throat: Oropharynx is clear and moist    Eyes: Conjunctivae and EOM are normal  Pupils are equal, round, and reactive to light  Neck: Normal range of motion  Neck supple  Cardiovascular: Normal rate, regular rhythm and normal heart sounds  Exam reveals no gallop and no friction rub  No murmur heard  Pulmonary/Chest: Effort normal  She has no wheezes  She has no rales  Decreased breath sound in bases     Abdominal: Soft  Bowel sounds are normal  She exhibits no distension  There is no tenderness  There is no rebound  Musculoskeletal: Normal range of motion  She exhibits no edema, tenderness or deformity  Neurological: She is alert  No cranial nerve deficit  Some forgetfulness at baseline      Skin: Skin is warm and dry  No rash noted  No erythema  Multiple wounds POA on right knee, right toe, left shin  Dressing is on, no drainage  Stage 2 pressure ulcer POA on buttocks  Psychiatric: She has a normal mood and affect  Her behavior is normal  Thought content normal    Vitals reviewed  Discussion with Family: son     Goals of Care Discussions:  · Code Status at Discharge: Level 3 - DNAR and DNI  · Were there any Goals of Care Discussions during Hospitalization?: No  · Results of any General Goals of Care Discussions:  Same    · POLST Completed: No   · If POLST Completed, Summary of POLST Agreement Provided Here: n/a    · OK to Rehospitalize if Needed? Yes    Discharge instructions/Information to patient and family:   See after visit summary section titled Discharge Instructions for information provided to patient and family  Planned Readmission: no      Discharge Statement:  I spent 37 minutes discharging the patient  This time was spent on the day of discharge  I had direct contact with the patient on the day of discharge   Greater than 50% of the total time was spent examining patient, answering all patient questions, arranging and discussing plan of care with patient as well as directly providing post-discharge instructions  Additional time then spent on discharge activities      ** Please Note: This note has been constructed using a voice recognition system **

## 2018-08-28 NOTE — ASSESSMENT & PLAN NOTE
· Creatinine stable   · Mild hypernatremia noted  · Most likely secondary to the IV fluids that she received  · IVF was discontinue     · Will encourage fluid intake

## 2018-08-28 NOTE — ASSESSMENT & PLAN NOTE
· This is a non ST segment elevation MI type 2 secondary to acute infection and rhabdo  · Troponins are equivocal they are not climbing or dropping  No ischemic changes on EKG  · Patient is completely asymptomatic  · Continue current medical management

## 2018-08-28 NOTE — NURSING NOTE
PT IS AWAKE AND ORIENTED AT PRESENT WITH A HX OF BEING FORGETFULL  AT PRESENT, NONE OF THIS IS OBSERVED AND SHE FOLLOWS DIRECTIONS  SHE IS R/A STATUS  RESP  EASY, AND NO SOB  ABD IS SOFT AND POS BSX4  SHE VOIDS INC  SHE HAS EDEMA TO  B/L LEGS AND  MULT WOUNDS WHICH WERE TREATED ON PRIOR SHIFT AND DRESSINGS  ARE NON ADHERENT AND MAINTAINED CLEAN AND DRY  SHE HAS NO C/O PAIN AT PRESENT  CALL BELL GIVEN

## 2018-08-28 NOTE — SOCIAL WORK
Received TC from LUCI Irvin from [facility]  who has accepted the pt  Spoke to pt son Norma Blevins and HILDA Painter Offer of same  Family was made aware pt will be in a quad room near the nurses station  Family is agreeable to same  Pt has been medically cleared to go to the SNF today  Family made aware

## 2018-09-06 LAB
ANION GAP SERPL CALCULATED.3IONS-SCNC: 9 MMOL/L (ref 4–13)
BASOPHILS # BLD AUTO: 0.1 THOUSANDS/ΜL (ref 0–0.1)
BASOPHILS NFR BLD AUTO: 1 % (ref 0–2)
BUN SERPL-MCNC: 27 MG/DL (ref 7–25)
CALCIUM SERPL-MCNC: 8.9 MG/DL (ref 8.6–10.5)
CHLORIDE SERPL-SCNC: 102 MMOL/L (ref 98–107)
CO2 SERPL-SCNC: 27 MMOL/L (ref 21–31)
CREAT SERPL-MCNC: 1.75 MG/DL (ref 0.6–1.2)
EOSINOPHIL # BLD AUTO: 0.2 THOUSAND/ΜL (ref 0–0.61)
EOSINOPHIL NFR BLD AUTO: 2 % (ref 0–5)
ERYTHROCYTE [DISTWIDTH] IN BLOOD BY AUTOMATED COUNT: 16.1 % (ref 11.5–14.5)
GFR SERPL CREATININE-BSD FRML MDRD: 26 ML/MIN/1.73SQ M
GLUCOSE SERPL-MCNC: 115 MG/DL (ref 65–99)
HCT VFR BLD AUTO: 34.2 % (ref 34.8–46.1)
HGB BLD-MCNC: 11 G/DL (ref 12–16)
LYMPHOCYTES # BLD AUTO: 1.7 THOUSANDS/ΜL (ref 0.6–4.47)
LYMPHOCYTES NFR BLD AUTO: 21 % (ref 21–51)
MCH RBC QN AUTO: 29 PG (ref 26–34)
MCHC RBC AUTO-ENTMCNC: 32.2 G/DL (ref 31–37)
MCV RBC AUTO: 90 FL (ref 81–99)
MONOCYTES # BLD AUTO: 0.7 THOUSAND/ΜL (ref 0.17–1.22)
MONOCYTES NFR BLD AUTO: 9 % (ref 2–12)
NEUTROPHILS # BLD AUTO: 5.2 THOUSANDS/ΜL (ref 1.4–6.5)
NEUTS SEG NFR BLD AUTO: 66 % (ref 42–75)
NRBC BLD AUTO-RTO: 0 /100 WBCS
PLATELET # BLD AUTO: 424 THOUSANDS/UL (ref 149–390)
PMV BLD AUTO: 8.2 FL (ref 8.6–11.7)
POTASSIUM SERPL-SCNC: 4.5 MMOL/L (ref 3.5–5.5)
RBC # BLD AUTO: 3.79 MILLION/UL (ref 3.9–5.2)
SODIUM SERPL-SCNC: 138 MMOL/L (ref 134–143)
WBC # BLD AUTO: 7.9 THOUSAND/UL (ref 4.8–10.8)

## 2018-09-06 PROCEDURE — 80048 BASIC METABOLIC PNL TOTAL CA: CPT | Performed by: INTERNAL MEDICINE

## 2018-09-06 PROCEDURE — 87077 CULTURE AEROBIC IDENTIFY: CPT | Performed by: INTERNAL MEDICINE

## 2018-09-06 PROCEDURE — 87147 CULTURE TYPE IMMUNOLOGIC: CPT | Performed by: INTERNAL MEDICINE

## 2018-09-06 PROCEDURE — 87186 SC STD MICRODIL/AGAR DIL: CPT | Performed by: INTERNAL MEDICINE

## 2018-09-06 PROCEDURE — 87205 SMEAR GRAM STAIN: CPT | Performed by: INTERNAL MEDICINE

## 2018-09-06 PROCEDURE — 87070 CULTURE OTHR SPECIMN AEROBIC: CPT | Performed by: INTERNAL MEDICINE

## 2018-09-06 PROCEDURE — 85025 COMPLETE CBC W/AUTO DIFF WBC: CPT | Performed by: INTERNAL MEDICINE

## 2018-09-09 LAB
BACTERIA WND AEROBE CULT: ABNORMAL
BACTERIA WND AEROBE CULT: ABNORMAL
GRAM STN SPEC: ABNORMAL
GRAM STN SPEC: ABNORMAL

## 2018-11-20 ENCOUNTER — APPOINTMENT (OUTPATIENT)
Dept: RADIOLOGY | Facility: HOSPITAL | Age: 83
End: 2018-11-20
Payer: MEDICARE

## 2018-11-20 PROCEDURE — 71046 X-RAY EXAM CHEST 2 VIEWS: CPT

## 2018-12-10 LAB
ANION GAP SERPL CALCULATED.3IONS-SCNC: 6 MMOL/L (ref 4–13)
BASOPHILS # BLD AUTO: 0.1 THOUSANDS/ΜL (ref 0–0.1)
BASOPHILS NFR BLD AUTO: 1 % (ref 0–2)
BUN SERPL-MCNC: 20 MG/DL (ref 7–25)
CALCIUM SERPL-MCNC: 8.9 MG/DL (ref 8.6–10.5)
CHLORIDE SERPL-SCNC: 105 MMOL/L (ref 98–107)
CO2 SERPL-SCNC: 28 MMOL/L (ref 21–31)
CREAT SERPL-MCNC: 1.05 MG/DL (ref 0.6–1.2)
EOSINOPHIL # BLD AUTO: 0.3 THOUSAND/ΜL (ref 0–0.61)
EOSINOPHIL NFR BLD AUTO: 6 % (ref 0–5)
ERYTHROCYTE [DISTWIDTH] IN BLOOD BY AUTOMATED COUNT: 15.6 % (ref 11.5–14.5)
GFR SERPL CREATININE-BSD FRML MDRD: 48 ML/MIN/1.73SQ M
GLUCOSE SERPL-MCNC: 80 MG/DL (ref 65–99)
HCT VFR BLD AUTO: 32.2 % (ref 34.8–46.1)
HGB BLD-MCNC: 10.8 G/DL (ref 12–16)
LYMPHOCYTES # BLD AUTO: 1.8 THOUSANDS/ΜL (ref 0.6–4.47)
LYMPHOCYTES NFR BLD AUTO: 35 % (ref 21–51)
MCH RBC QN AUTO: 30.4 PG (ref 26–34)
MCHC RBC AUTO-ENTMCNC: 33.4 G/DL (ref 31–37)
MCV RBC AUTO: 91 FL (ref 81–99)
MONOCYTES # BLD AUTO: 0.6 THOUSAND/ΜL (ref 0.17–1.22)
MONOCYTES NFR BLD AUTO: 12 % (ref 2–12)
NEUTROPHILS # BLD AUTO: 2.3 THOUSANDS/ΜL (ref 1.4–6.5)
NEUTS SEG NFR BLD AUTO: 45 % (ref 42–75)
NRBC BLD AUTO-RTO: 0 /100 WBCS
PLATELET # BLD AUTO: 217 THOUSANDS/UL (ref 149–390)
PMV BLD AUTO: 8.7 FL (ref 8.6–11.7)
POTASSIUM SERPL-SCNC: 3.9 MMOL/L (ref 3.5–5.5)
RBC # BLD AUTO: 3.54 MILLION/UL (ref 3.9–5.2)
SODIUM SERPL-SCNC: 139 MMOL/L (ref 134–143)
WBC # BLD AUTO: 5.2 THOUSAND/UL (ref 4.8–10.8)

## 2018-12-10 PROCEDURE — 80048 BASIC METABOLIC PNL TOTAL CA: CPT | Performed by: INTERNAL MEDICINE

## 2018-12-10 PROCEDURE — 85025 COMPLETE CBC W/AUTO DIFF WBC: CPT | Performed by: INTERNAL MEDICINE

## 2019-01-01 LAB
ANION GAP SERPL CALCULATED.3IONS-SCNC: 9 MMOL/L (ref 4–13)
BASOPHILS # BLD AUTO: 0.1 THOUSANDS/ΜL (ref 0–0.1)
BASOPHILS NFR BLD AUTO: 1 % (ref 0–2)
BUN SERPL-MCNC: 28 MG/DL (ref 7–25)
CALCIUM SERPL-MCNC: 8.9 MG/DL (ref 8.6–10.5)
CHLORIDE SERPL-SCNC: 105 MMOL/L (ref 98–107)
CO2 SERPL-SCNC: 26 MMOL/L (ref 21–31)
CREAT SERPL-MCNC: 1.39 MG/DL (ref 0.6–1.2)
EOSINOPHIL # BLD AUTO: 0.2 THOUSAND/ΜL (ref 0–0.61)
EOSINOPHIL NFR BLD AUTO: 4 % (ref 0–5)
ERYTHROCYTE [DISTWIDTH] IN BLOOD BY AUTOMATED COUNT: 14.2 % (ref 11.5–14.5)
GFR SERPL CREATININE-BSD FRML MDRD: 34 ML/MIN/1.73SQ M
GLUCOSE SERPL-MCNC: 76 MG/DL (ref 65–99)
HCT VFR BLD AUTO: 30.8 % (ref 42–47)
HGB BLD-MCNC: 10.4 G/DL (ref 12–16)
LYMPHOCYTES # BLD AUTO: 2 THOUSANDS/ΜL (ref 0.6–4.47)
LYMPHOCYTES NFR BLD AUTO: 34 % (ref 21–51)
MCH RBC QN AUTO: 29 PG (ref 26–34)
MCHC RBC AUTO-ENTMCNC: 33.7 G/DL (ref 31–37)
MCV RBC AUTO: 86 FL (ref 81–99)
MONOCYTES # BLD AUTO: 0.6 THOUSAND/ΜL (ref 0.17–1.22)
MONOCYTES NFR BLD AUTO: 10 % (ref 2–12)
NEUTROPHILS # BLD AUTO: 2.9 THOUSANDS/ΜL (ref 1.4–6.5)
NEUTS SEG NFR BLD AUTO: 50 % (ref 42–75)
PLATELET # BLD AUTO: 332 THOUSANDS/UL (ref 149–390)
PMV BLD AUTO: 8 FL (ref 8.6–11.7)
POTASSIUM SERPL-SCNC: 4 MMOL/L (ref 3.5–5.5)
RBC # BLD AUTO: 3.58 MILLION/UL (ref 3.9–5.2)
SODIUM SERPL-SCNC: 140 MMOL/L (ref 134–143)
WBC # BLD AUTO: 5.8 THOUSAND/UL (ref 4.8–10.8)

## 2019-01-01 PROCEDURE — 80048 BASIC METABOLIC PNL TOTAL CA: CPT | Performed by: INTERNAL MEDICINE

## 2019-01-01 PROCEDURE — 85025 COMPLETE CBC W/AUTO DIFF WBC: CPT | Performed by: INTERNAL MEDICINE

## 2019-01-02 ENCOUNTER — APPOINTMENT (OUTPATIENT)
Dept: RADIOLOGY | Facility: HOSPITAL | Age: 84
End: 2019-01-02
Payer: MEDICARE

## 2019-01-02 LAB
ANION GAP SERPL CALCULATED.3IONS-SCNC: 7 MMOL/L (ref 4–13)
BASOPHILS # BLD AUTO: 0 THOUSANDS/ΜL (ref 0–0.1)
BASOPHILS NFR BLD AUTO: 1 % (ref 0–2)
BUN SERPL-MCNC: 24 MG/DL (ref 7–25)
CALCIUM SERPL-MCNC: 8.6 MG/DL (ref 8.6–10.5)
CHLORIDE SERPL-SCNC: 107 MMOL/L (ref 98–107)
CO2 SERPL-SCNC: 27 MMOL/L (ref 21–31)
CREAT SERPL-MCNC: 1.15 MG/DL (ref 0.6–1.2)
EOSINOPHIL # BLD AUTO: 0.3 THOUSAND/ΜL (ref 0–0.61)
EOSINOPHIL NFR BLD AUTO: 5 % (ref 0–5)
ERYTHROCYTE [DISTWIDTH] IN BLOOD BY AUTOMATED COUNT: 15.4 % (ref 11.5–14.5)
GFR SERPL CREATININE-BSD FRML MDRD: 43 ML/MIN/1.73SQ M
GLUCOSE SERPL-MCNC: 82 MG/DL (ref 65–99)
HCT VFR BLD AUTO: 29.6 % (ref 34.8–46.1)
HGB BLD-MCNC: 9.9 G/DL (ref 12–16)
LYMPHOCYTES # BLD AUTO: 2 THOUSANDS/ΜL (ref 0.6–4.47)
LYMPHOCYTES NFR BLD AUTO: 36 % (ref 21–51)
MCH RBC QN AUTO: 29.9 PG (ref 26–34)
MCHC RBC AUTO-ENTMCNC: 33.6 G/DL (ref 31–37)
MCV RBC AUTO: 89 FL (ref 81–99)
MONOCYTES # BLD AUTO: 0.6 THOUSAND/ΜL (ref 0.17–1.22)
MONOCYTES NFR BLD AUTO: 11 % (ref 2–12)
NEUTROPHILS # BLD AUTO: 2.6 THOUSANDS/ΜL (ref 1.4–6.5)
NEUTS SEG NFR BLD AUTO: 48 % (ref 42–75)
NRBC BLD AUTO-RTO: 0 /100 WBCS
PLATELET # BLD AUTO: 265 THOUSANDS/UL (ref 149–390)
PMV BLD AUTO: 7.5 FL (ref 8.6–11.7)
POTASSIUM SERPL-SCNC: 4.2 MMOL/L (ref 3.5–5.5)
RBC # BLD AUTO: 3.33 MILLION/UL (ref 3.9–5.2)
SODIUM SERPL-SCNC: 141 MMOL/L (ref 134–143)
WBC # BLD AUTO: 5.5 THOUSAND/UL (ref 4.8–10.8)

## 2019-01-02 PROCEDURE — 73030 X-RAY EXAM OF SHOULDER: CPT

## 2019-01-02 PROCEDURE — 85025 COMPLETE CBC W/AUTO DIFF WBC: CPT | Performed by: INTERNAL MEDICINE

## 2019-01-02 PROCEDURE — 80048 BASIC METABOLIC PNL TOTAL CA: CPT | Performed by: INTERNAL MEDICINE

## 2019-01-11 LAB
ALBUMIN SERPL BCP-MCNC: 3.5 G/DL (ref 3.5–5.7)
PREALB SERPL-MCNC: 20.2 MG/DL (ref 18–40)

## 2019-01-11 PROCEDURE — 82040 ASSAY OF SERUM ALBUMIN: CPT | Performed by: INTERNAL MEDICINE

## 2019-01-11 PROCEDURE — 84134 ASSAY OF PREALBUMIN: CPT | Performed by: INTERNAL MEDICINE

## 2019-01-15 LAB — TSH SERPL DL<=0.05 MIU/L-ACNC: 7.34 UIU/ML (ref 0.45–5.33)

## 2019-01-15 PROCEDURE — 84443 ASSAY THYROID STIM HORMONE: CPT | Performed by: INTERNAL MEDICINE

## 2019-01-24 ENCOUNTER — TRANSCRIBE ORDERS (OUTPATIENT)
Dept: ADMINISTRATIVE | Facility: HOSPITAL | Age: 84
End: 2019-01-24

## 2019-01-24 DIAGNOSIS — I73.9 PVD (PERIPHERAL VASCULAR DISEASE) (HCC): Primary | ICD-10-CM

## 2019-01-25 ENCOUNTER — HOSPITAL ENCOUNTER (OUTPATIENT)
Dept: NON INVASIVE DIAGNOSTICS | Facility: HOSPITAL | Age: 84
Discharge: HOME | End: 2019-01-25
Attending: PODIATRIST
Payer: MEDICARE

## 2019-01-25 DIAGNOSIS — I73.9 PVD (PERIPHERAL VASCULAR DISEASE) (HCC): ICD-10-CM

## 2019-01-25 PROCEDURE — 93923 UPR/LXTR ART STDY 3+ LVLS: CPT

## 2019-01-25 PROCEDURE — 93922 UPR/L XTREMITY ART 2 LEVELS: CPT | Performed by: SURGERY

## 2019-01-25 PROCEDURE — 93925 LOWER EXTREMITY STUDY: CPT

## 2019-01-25 PROCEDURE — 93925 LOWER EXTREMITY STUDY: CPT | Performed by: SURGERY

## 2019-02-09 LAB
ALBUMIN SERPL BCP-MCNC: 3.3 G/DL (ref 3.5–5.7)
PREALB SERPL-MCNC: 22.4 MG/DL (ref 18–40)

## 2019-02-09 PROCEDURE — 82040 ASSAY OF SERUM ALBUMIN: CPT | Performed by: INTERNAL MEDICINE

## 2019-02-09 PROCEDURE — 84134 ASSAY OF PREALBUMIN: CPT | Performed by: INTERNAL MEDICINE

## 2019-03-05 LAB
ANION GAP SERPL CALCULATED.3IONS-SCNC: 7 MMOL/L (ref 4–13)
BASOPHILS # BLD AUTO: 0.1 THOUSANDS/ΜL (ref 0–0.1)
BASOPHILS NFR BLD AUTO: 1 % (ref 0–2)
BUN SERPL-MCNC: 26 MG/DL (ref 7–25)
CALCIUM SERPL-MCNC: 8.7 MG/DL (ref 8.6–10.5)
CHLORIDE SERPL-SCNC: 106 MMOL/L (ref 98–107)
CO2 SERPL-SCNC: 27 MMOL/L (ref 21–31)
CREAT SERPL-MCNC: 1.08 MG/DL (ref 0.6–1.2)
EOSINOPHIL # BLD AUTO: 0.3 THOUSAND/ΜL (ref 0–0.61)
EOSINOPHIL NFR BLD AUTO: 5 % (ref 0–5)
ERYTHROCYTE [DISTWIDTH] IN BLOOD BY AUTOMATED COUNT: 15.1 % (ref 11.5–14.5)
GFR SERPL CREATININE-BSD FRML MDRD: 46 ML/MIN/1.73SQ M
GLUCOSE SERPL-MCNC: 84 MG/DL (ref 65–99)
HCT VFR BLD AUTO: 30 % (ref 42–47)
HGB BLD-MCNC: 10.4 G/DL (ref 12–16)
LYMPHOCYTES # BLD AUTO: 1.6 THOUSANDS/ΜL (ref 0.6–4.47)
LYMPHOCYTES NFR BLD AUTO: 29 % (ref 21–51)
MCH RBC QN AUTO: 31.7 PG (ref 26–34)
MCHC RBC AUTO-ENTMCNC: 34.7 G/DL (ref 31–37)
MCV RBC AUTO: 91 FL (ref 81–99)
MONOCYTES # BLD AUTO: 0.6 THOUSAND/ΜL (ref 0.17–1.22)
MONOCYTES NFR BLD AUTO: 12 % (ref 2–12)
NEUTROPHILS # BLD AUTO: 3 THOUSANDS/ΜL (ref 1.4–6.5)
NEUTS SEG NFR BLD AUTO: 54 % (ref 42–75)
NRBC BLD AUTO-RTO: 0 /100 WBCS
PLATELET # BLD AUTO: 279 THOUSANDS/UL (ref 149–390)
PMV BLD AUTO: 8.1 FL (ref 8.6–11.7)
POTASSIUM SERPL-SCNC: 4.2 MMOL/L (ref 3.5–5.5)
RBC # BLD AUTO: 3.29 MILLION/UL (ref 3.9–5.2)
SODIUM SERPL-SCNC: 140 MMOL/L (ref 134–143)
WBC # BLD AUTO: 5.5 THOUSAND/UL (ref 4.8–10.8)

## 2019-03-05 PROCEDURE — 80048 BASIC METABOLIC PNL TOTAL CA: CPT | Performed by: INTERNAL MEDICINE

## 2019-03-05 PROCEDURE — 85025 COMPLETE CBC W/AUTO DIFF WBC: CPT | Performed by: INTERNAL MEDICINE

## 2019-04-04 ENCOUNTER — HOSPITAL ENCOUNTER (OUTPATIENT)
Dept: NON INVASIVE DIAGNOSTICS | Facility: HOSPITAL | Age: 84
Discharge: HOME | End: 2019-04-04
Attending: INTERNAL MEDICINE
Payer: MEDICARE

## 2019-04-04 PROCEDURE — 93971 EXTREMITY STUDY: CPT | Performed by: SURGERY

## 2019-04-04 PROCEDURE — 93971 EXTREMITY STUDY: CPT

## 2019-06-04 LAB
ANION GAP SERPL CALCULATED.3IONS-SCNC: 7 MMOL/L (ref 4–13)
BASOPHILS # BLD AUTO: 0.1 THOUSANDS/ΜL (ref 0–0.1)
BASOPHILS NFR BLD AUTO: 1 % (ref 0–2)
BUN SERPL-MCNC: 25 MG/DL (ref 7–25)
CALCIUM SERPL-MCNC: 9.4 MG/DL (ref 8.6–10.5)
CHLORIDE SERPL-SCNC: 107 MMOL/L (ref 98–107)
CO2 SERPL-SCNC: 29 MMOL/L (ref 21–31)
CREAT SERPL-MCNC: 1.27 MG/DL (ref 0.6–1.2)
EOSINOPHIL # BLD AUTO: 0.2 THOUSAND/ΜL (ref 0–0.61)
EOSINOPHIL NFR BLD AUTO: 4 % (ref 0–5)
ERYTHROCYTE [DISTWIDTH] IN BLOOD BY AUTOMATED COUNT: 14.7 % (ref 11.5–14.5)
GFR SERPL CREATININE-BSD FRML MDRD: 38 ML/MIN/1.73SQ M
GLUCOSE SERPL-MCNC: 85 MG/DL (ref 65–99)
HCT VFR BLD AUTO: 31.1 % (ref 42–47)
HGB BLD-MCNC: 10.5 G/DL (ref 12–16)
LYMPHOCYTES # BLD AUTO: 1.8 THOUSANDS/ΜL (ref 0.6–4.47)
LYMPHOCYTES NFR BLD AUTO: 33 % (ref 21–51)
MCH RBC QN AUTO: 30.5 PG (ref 26–34)
MCHC RBC AUTO-ENTMCNC: 33.6 G/DL (ref 31–37)
MCV RBC AUTO: 91 FL (ref 81–99)
MONOCYTES # BLD AUTO: 0.7 THOUSAND/ΜL (ref 0.17–1.22)
MONOCYTES NFR BLD AUTO: 12 % (ref 2–12)
NEUTROPHILS # BLD AUTO: 2.8 THOUSANDS/ΜL (ref 1.4–6.5)
NEUTS SEG NFR BLD AUTO: 50 % (ref 42–75)
PLATELET # BLD AUTO: 275 THOUSANDS/UL (ref 149–390)
PMV BLD AUTO: 8.2 FL (ref 8.6–11.7)
POTASSIUM SERPL-SCNC: 4.1 MMOL/L (ref 3.5–5.5)
RBC # BLD AUTO: 3.43 MILLION/UL (ref 3.9–5.2)
SODIUM SERPL-SCNC: 143 MMOL/L (ref 134–143)
WBC # BLD AUTO: 5.6 THOUSAND/UL (ref 4.8–10.8)

## 2019-06-04 PROCEDURE — 85025 COMPLETE CBC W/AUTO DIFF WBC: CPT | Performed by: INTERNAL MEDICINE

## 2019-06-04 PROCEDURE — 80048 BASIC METABOLIC PNL TOTAL CA: CPT | Performed by: INTERNAL MEDICINE

## 2019-07-02 LAB
ANION GAP SERPL CALCULATED.3IONS-SCNC: 6 MMOL/L (ref 4–13)
BUN SERPL-MCNC: 30 MG/DL (ref 7–25)
CALCIUM SERPL-MCNC: 9.1 MG/DL (ref 8.6–10.5)
CHLORIDE SERPL-SCNC: 105 MMOL/L (ref 98–107)
CO2 SERPL-SCNC: 29 MMOL/L (ref 21–31)
CREAT SERPL-MCNC: 1.31 MG/DL (ref 0.6–1.2)
GFR SERPL CREATININE-BSD FRML MDRD: 36 ML/MIN/1.73SQ M
GLUCOSE SERPL-MCNC: 86 MG/DL (ref 65–99)
POTASSIUM SERPL-SCNC: 4.3 MMOL/L (ref 3.5–5.5)
SODIUM SERPL-SCNC: 140 MMOL/L (ref 134–143)
TSH SERPL DL<=0.05 MIU/L-ACNC: 2.85 UIU/ML (ref 0.45–5.33)

## 2019-07-02 PROCEDURE — 84443 ASSAY THYROID STIM HORMONE: CPT | Performed by: INTERNAL MEDICINE

## 2019-07-02 PROCEDURE — 80048 BASIC METABOLIC PNL TOTAL CA: CPT | Performed by: INTERNAL MEDICINE

## 2019-07-20 LAB
ANION GAP SERPL CALCULATED.3IONS-SCNC: 8 MMOL/L (ref 4–13)
BACTERIA UR QL AUTO: ABNORMAL /HPF
BASOPHILS # BLD AUTO: 0 THOUSANDS/ΜL (ref 0–0.1)
BASOPHILS NFR BLD AUTO: 0 % (ref 0–2)
BILIRUB UR QL STRIP: NEGATIVE
BUN SERPL-MCNC: 35 MG/DL (ref 7–25)
CALCIUM SERPL-MCNC: 9.5 MG/DL (ref 8.6–10.5)
CHLORIDE SERPL-SCNC: 97 MMOL/L (ref 98–107)
CLARITY UR: CLEAR
CO2 SERPL-SCNC: 31 MMOL/L (ref 21–31)
COLOR UR: YELLOW
CREAT SERPL-MCNC: 1.38 MG/DL (ref 0.6–1.2)
EOSINOPHIL # BLD AUTO: 0 THOUSAND/ΜL (ref 0–0.61)
EOSINOPHIL NFR BLD AUTO: 0 % (ref 0–5)
ERYTHROCYTE [DISTWIDTH] IN BLOOD BY AUTOMATED COUNT: 15.1 % (ref 11.5–14.5)
GFR SERPL CREATININE-BSD FRML MDRD: 34 ML/MIN/1.73SQ M
GLUCOSE SERPL-MCNC: 151 MG/DL (ref 65–99)
GLUCOSE UR STRIP-MCNC: NEGATIVE MG/DL
HCT VFR BLD AUTO: 36.8 % (ref 42–47)
HGB BLD-MCNC: 12.4 G/DL (ref 12–16)
HGB UR QL STRIP.AUTO: NEGATIVE
KETONES UR STRIP-MCNC: NEGATIVE MG/DL
LEUKOCYTE ESTERASE UR QL STRIP: NEGATIVE
LYMPHOCYTES # BLD AUTO: 1.1 THOUSANDS/ΜL (ref 0.6–4.47)
LYMPHOCYTES NFR BLD AUTO: 11 % (ref 21–51)
MCH RBC QN AUTO: 29.4 PG (ref 26–34)
MCHC RBC AUTO-ENTMCNC: 33.8 G/DL (ref 31–37)
MCV RBC AUTO: 87 FL (ref 81–99)
MONOCYTES # BLD AUTO: 0.8 THOUSAND/ΜL (ref 0.17–1.22)
MONOCYTES NFR BLD AUTO: 7 % (ref 2–12)
NEUTROPHILS # BLD AUTO: 8.8 THOUSANDS/ΜL (ref 1.4–6.5)
NEUTS SEG NFR BLD AUTO: 82 % (ref 42–75)
NITRITE UR QL STRIP: NEGATIVE
NON-SQ EPI CELLS URNS QL MICRO: ABNORMAL /HPF
PH UR STRIP.AUTO: 6.5 [PH]
PLATELET # BLD AUTO: 352 THOUSANDS/UL (ref 149–390)
PMV BLD AUTO: 8 FL (ref 8.6–11.7)
POTASSIUM SERPL-SCNC: 4.7 MMOL/L (ref 3.5–5.5)
PROT UR STRIP-MCNC: ABNORMAL MG/DL
RBC # BLD AUTO: 4.23 MILLION/UL (ref 3.9–5.2)
RBC #/AREA URNS AUTO: ABNORMAL /HPF
SODIUM SERPL-SCNC: 136 MMOL/L (ref 134–143)
SP GR UR STRIP.AUTO: 1.01 (ref 1–1.03)
UROBILINOGEN UR QL STRIP.AUTO: 0.2 E.U./DL
WBC # BLD AUTO: 10.8 THOUSAND/UL (ref 4.8–10.8)
WBC #/AREA URNS AUTO: ABNORMAL /HPF

## 2019-07-20 PROCEDURE — 80048 BASIC METABOLIC PNL TOTAL CA: CPT | Performed by: INTERNAL MEDICINE

## 2019-07-20 PROCEDURE — 85025 COMPLETE CBC W/AUTO DIFF WBC: CPT | Performed by: INTERNAL MEDICINE

## 2019-07-20 PROCEDURE — 81001 URINALYSIS AUTO W/SCOPE: CPT | Performed by: INTERNAL MEDICINE

## 2019-07-20 PROCEDURE — 87086 URINE CULTURE/COLONY COUNT: CPT | Performed by: INTERNAL MEDICINE

## 2019-07-21 LAB
ANION GAP SERPL CALCULATED.3IONS-SCNC: 10 MMOL/L (ref 4–13)
BACTERIA UR CULT: NORMAL
BASOPHILS # BLD AUTO: 0 THOUSANDS/ΜL (ref 0–0.1)
BASOPHILS NFR BLD AUTO: 1 % (ref 0–2)
BUN SERPL-MCNC: 39 MG/DL (ref 7–25)
CALCIUM SERPL-MCNC: 9 MG/DL (ref 8.6–10.5)
CHLORIDE SERPL-SCNC: 98 MMOL/L (ref 98–107)
CO2 SERPL-SCNC: 28 MMOL/L (ref 21–31)
CREAT SERPL-MCNC: 1.37 MG/DL (ref 0.6–1.2)
EOSINOPHIL # BLD AUTO: 0 THOUSAND/ΜL (ref 0–0.61)
EOSINOPHIL NFR BLD AUTO: 0 % (ref 0–5)
ERYTHROCYTE [DISTWIDTH] IN BLOOD BY AUTOMATED COUNT: 14.9 % (ref 11.5–14.5)
GFR SERPL CREATININE-BSD FRML MDRD: 34 ML/MIN/1.73SQ M
GLUCOSE SERPL-MCNC: 137 MG/DL (ref 65–99)
HCT VFR BLD AUTO: 35.3 % (ref 42–47)
HGB BLD-MCNC: 12.1 G/DL (ref 12–16)
LYMPHOCYTES # BLD AUTO: 1.4 THOUSANDS/ΜL (ref 0.6–4.47)
LYMPHOCYTES NFR BLD AUTO: 14 % (ref 21–51)
MCH RBC QN AUTO: 29.7 PG (ref 26–34)
MCHC RBC AUTO-ENTMCNC: 34.1 G/DL (ref 31–37)
MCV RBC AUTO: 87 FL (ref 81–99)
MONOCYTES # BLD AUTO: 0.9 THOUSAND/ΜL (ref 0.17–1.22)
MONOCYTES NFR BLD AUTO: 9 % (ref 2–12)
NEUTROPHILS # BLD AUTO: 7.8 THOUSANDS/ΜL (ref 1.4–6.5)
NEUTS SEG NFR BLD AUTO: 77 % (ref 42–75)
PLATELET # BLD AUTO: 362 THOUSANDS/UL (ref 149–390)
PMV BLD AUTO: 8.4 FL (ref 8.6–11.7)
POTASSIUM SERPL-SCNC: 4.2 MMOL/L (ref 3.5–5.5)
RBC # BLD AUTO: 4.06 MILLION/UL (ref 3.9–5.2)
SODIUM SERPL-SCNC: 136 MMOL/L (ref 134–143)
WBC # BLD AUTO: 10.2 THOUSAND/UL (ref 4.8–10.8)

## 2019-07-21 PROCEDURE — 80048 BASIC METABOLIC PNL TOTAL CA: CPT | Performed by: INTERNAL MEDICINE

## 2019-07-21 PROCEDURE — 85025 COMPLETE CBC W/AUTO DIFF WBC: CPT | Performed by: INTERNAL MEDICINE

## 2019-07-22 LAB
ANION GAP SERPL CALCULATED.3IONS-SCNC: 8 MMOL/L (ref 4–13)
BASOPHILS # BLD AUTO: 0.1 THOUSANDS/ΜL (ref 0–0.1)
BASOPHILS NFR BLD AUTO: 1 % (ref 0–2)
BUN SERPL-MCNC: 38 MG/DL (ref 7–25)
CALCIUM SERPL-MCNC: 8.7 MG/DL (ref 8.6–10.5)
CHLORIDE SERPL-SCNC: 101 MMOL/L (ref 98–107)
CO2 SERPL-SCNC: 28 MMOL/L (ref 21–31)
CREAT SERPL-MCNC: 1.27 MG/DL (ref 0.6–1.2)
EOSINOPHIL # BLD AUTO: 0 THOUSAND/ΜL (ref 0–0.61)
EOSINOPHIL NFR BLD AUTO: 0 % (ref 0–5)
ERYTHROCYTE [DISTWIDTH] IN BLOOD BY AUTOMATED COUNT: 15.1 % (ref 11.5–14.5)
GFR SERPL CREATININE-BSD FRML MDRD: 38 ML/MIN/1.73SQ M
GLUCOSE SERPL-MCNC: 141 MG/DL (ref 65–99)
HCT VFR BLD AUTO: 35.7 % (ref 42–47)
HGB BLD-MCNC: 11.9 G/DL (ref 12–16)
LYMPHOCYTES # BLD AUTO: 1.2 THOUSANDS/ΜL (ref 0.6–4.47)
LYMPHOCYTES NFR BLD AUTO: 11 % (ref 21–51)
MCH RBC QN AUTO: 29.1 PG (ref 26–34)
MCHC RBC AUTO-ENTMCNC: 33.2 G/DL (ref 31–37)
MCV RBC AUTO: 88 FL (ref 81–99)
MONOCYTES # BLD AUTO: 1 THOUSAND/ΜL (ref 0.17–1.22)
MONOCYTES NFR BLD AUTO: 9 % (ref 2–12)
NEUTROPHILS # BLD AUTO: 8.9 THOUSANDS/ΜL (ref 1.4–6.5)
NEUTS SEG NFR BLD AUTO: 79 % (ref 42–75)
PLATELET # BLD AUTO: 360 THOUSANDS/UL (ref 149–390)
PMV BLD AUTO: 8.4 FL (ref 8.6–11.7)
POTASSIUM SERPL-SCNC: 4.1 MMOL/L (ref 3.5–5.5)
RBC # BLD AUTO: 4.07 MILLION/UL (ref 3.9–5.2)
SODIUM SERPL-SCNC: 137 MMOL/L (ref 134–143)
WBC # BLD AUTO: 11.3 THOUSAND/UL (ref 4.8–10.8)

## 2019-07-22 PROCEDURE — 80048 BASIC METABOLIC PNL TOTAL CA: CPT | Performed by: INTERNAL MEDICINE

## 2019-07-22 PROCEDURE — 85025 COMPLETE CBC W/AUTO DIFF WBC: CPT | Performed by: INTERNAL MEDICINE

## 2019-07-23 ENCOUNTER — APPOINTMENT (OUTPATIENT)
Dept: RADIOLOGY | Facility: HOSPITAL | Age: 84
DRG: 389 | End: 2019-07-23
Payer: MEDICARE

## 2019-07-23 LAB
ANION GAP SERPL CALCULATED.3IONS-SCNC: 11 MMOL/L (ref 4–13)
BASOPHILS # BLD AUTO: 0.1 THOUSANDS/ΜL (ref 0–0.1)
BASOPHILS NFR BLD AUTO: 0 % (ref 0–2)
BUN SERPL-MCNC: 37 MG/DL (ref 7–25)
CALCIUM SERPL-MCNC: 8.7 MG/DL (ref 8.6–10.5)
CHLORIDE SERPL-SCNC: 101 MMOL/L (ref 98–107)
CO2 SERPL-SCNC: 26 MMOL/L (ref 21–31)
CREAT SERPL-MCNC: 1.32 MG/DL (ref 0.6–1.2)
EOSINOPHIL # BLD AUTO: 0 THOUSAND/ΜL (ref 0–0.61)
EOSINOPHIL NFR BLD AUTO: 0 % (ref 0–5)
ERYTHROCYTE [DISTWIDTH] IN BLOOD BY AUTOMATED COUNT: 14.9 % (ref 11.5–14.5)
GFR SERPL CREATININE-BSD FRML MDRD: 36 ML/MIN/1.73SQ M
GLUCOSE SERPL-MCNC: 128 MG/DL (ref 65–99)
HCT VFR BLD AUTO: 37.2 % (ref 42–47)
HGB BLD-MCNC: 12.5 G/DL (ref 12–16)
LYMPHOCYTES # BLD AUTO: 1.6 THOUSANDS/ΜL (ref 0.6–4.47)
LYMPHOCYTES NFR BLD AUTO: 14 % (ref 21–51)
MCH RBC QN AUTO: 29.7 PG (ref 26–34)
MCHC RBC AUTO-ENTMCNC: 33.7 G/DL (ref 31–37)
MCV RBC AUTO: 88 FL (ref 81–99)
MONOCYTES # BLD AUTO: 1.2 THOUSAND/ΜL (ref 0.17–1.22)
MONOCYTES NFR BLD AUTO: 10 % (ref 2–12)
NEUTROPHILS # BLD AUTO: 9.1 THOUSANDS/ΜL (ref 1.4–6.5)
NEUTS SEG NFR BLD AUTO: 76 % (ref 42–75)
PLATELET # BLD AUTO: 387 THOUSANDS/UL (ref 149–390)
PMV BLD AUTO: 8.3 FL (ref 8.6–11.7)
POTASSIUM SERPL-SCNC: 4 MMOL/L (ref 3.5–5.5)
RBC # BLD AUTO: 4.22 MILLION/UL (ref 3.9–5.2)
SODIUM SERPL-SCNC: 138 MMOL/L (ref 134–143)
WBC # BLD AUTO: 12 THOUSAND/UL (ref 4.8–10.8)

## 2019-07-23 PROCEDURE — 80048 BASIC METABOLIC PNL TOTAL CA: CPT | Performed by: INTERNAL MEDICINE

## 2019-07-23 PROCEDURE — 85025 COMPLETE CBC W/AUTO DIFF WBC: CPT | Performed by: INTERNAL MEDICINE

## 2019-07-23 PROCEDURE — 71046 X-RAY EXAM CHEST 2 VIEWS: CPT

## 2019-07-24 ENCOUNTER — HOSPITAL ENCOUNTER (INPATIENT)
Facility: HOSPITAL | Age: 84
LOS: 6 days | Discharge: NURSING, REHAB OR LONG-TERM CARE | DRG: 389 | End: 2019-07-30
Admitting: INTERNAL MEDICINE
Payer: MEDICARE

## 2019-07-24 ENCOUNTER — APPOINTMENT (EMERGENCY)
Dept: CT IMAGING | Facility: HOSPITAL | Age: 84
DRG: 389 | End: 2019-07-24
Payer: MEDICARE

## 2019-07-24 ENCOUNTER — APPOINTMENT (INPATIENT)
Dept: RADIOLOGY | Facility: HOSPITAL | Age: 84
DRG: 389 | End: 2019-07-24
Payer: MEDICARE

## 2019-07-24 DIAGNOSIS — K56.609 SMALL BOWEL OBSTRUCTION (HCC): Primary | ICD-10-CM

## 2019-07-24 DIAGNOSIS — I48.91 NEW ONSET A-FIB (HCC): ICD-10-CM

## 2019-07-24 PROBLEM — I50.9 CONGESTIVE HEART FAILURE (CHF) (HCC): Status: ACTIVE | Noted: 2019-07-24

## 2019-07-24 PROBLEM — D72.823 LEUKEMOID REACTION: Status: ACTIVE | Noted: 2019-07-24

## 2019-07-24 PROBLEM — R41.89 COGNITIVE IMPAIRMENT: Status: ACTIVE | Noted: 2019-07-24

## 2019-07-24 LAB
ALBUMIN SERPL BCP-MCNC: 3.5 G/DL (ref 3.5–5.7)
ALP SERPL-CCNC: 73 U/L (ref 55–165)
ALT SERPL W P-5'-P-CCNC: 55 U/L (ref 7–52)
ANION GAP SERPL CALCULATED.3IONS-SCNC: 9 MMOL/L (ref 4–13)
APTT PPP: 30 SECONDS (ref 23–37)
AST SERPL W P-5'-P-CCNC: 27 U/L (ref 13–39)
BASOPHILS # BLD AUTO: 0 THOUSANDS/ΜL (ref 0–0.1)
BASOPHILS NFR BLD AUTO: 0 % (ref 0–2)
BILIRUB SERPL-MCNC: 0.5 MG/DL (ref 0.2–1)
BNP SERPL-MCNC: 431 PG/ML (ref 1–100)
BUN SERPL-MCNC: 40 MG/DL (ref 7–25)
CALCIUM SERPL-MCNC: 8.9 MG/DL (ref 8.6–10.5)
CHLORIDE SERPL-SCNC: 99 MMOL/L (ref 98–107)
CO2 SERPL-SCNC: 31 MMOL/L (ref 21–31)
CREAT SERPL-MCNC: 1.48 MG/DL (ref 0.6–1.2)
EOSINOPHIL # BLD AUTO: 0 THOUSAND/ΜL (ref 0–0.61)
EOSINOPHIL NFR BLD AUTO: 0 % (ref 0–5)
ERYTHROCYTE [DISTWIDTH] IN BLOOD BY AUTOMATED COUNT: 15.2 % (ref 11.5–14.5)
GFR SERPL CREATININE-BSD FRML MDRD: 31 ML/MIN/1.73SQ M
GLUCOSE SERPL-MCNC: 170 MG/DL (ref 65–99)
HCT VFR BLD AUTO: 37.2 % (ref 42–47)
HGB BLD-MCNC: 12.3 G/DL (ref 12–16)
INR PPP: 1.06 (ref 0.9–1.5)
LACTATE SERPL-SCNC: 1.9 MMOL/L (ref 0.5–2)
LIPASE SERPL-CCNC: 16 U/L (ref 11–82)
LYMPHOCYTES # BLD AUTO: 0.9 THOUSANDS/ΜL (ref 0.6–4.47)
LYMPHOCYTES NFR BLD AUTO: 7 % (ref 21–51)
MCH RBC QN AUTO: 29 PG (ref 26–34)
MCHC RBC AUTO-ENTMCNC: 33.1 G/DL (ref 31–37)
MCV RBC AUTO: 88 FL (ref 81–99)
MONOCYTES # BLD AUTO: 1.1 THOUSAND/ΜL (ref 0.17–1.22)
MONOCYTES NFR BLD AUTO: 8 % (ref 2–12)
NEUTROPHILS # BLD AUTO: 11.8 THOUSANDS/ΜL (ref 1.4–6.5)
NEUTS SEG NFR BLD AUTO: 85 % (ref 42–75)
PLATELET # BLD AUTO: 360 THOUSANDS/UL (ref 149–390)
PMV BLD AUTO: 7.7 FL (ref 8.6–11.7)
POTASSIUM SERPL-SCNC: 4.1 MMOL/L (ref 3.5–5.5)
PROT SERPL-MCNC: 6.5 G/DL (ref 6.4–8.9)
PROTHROMBIN TIME: 12.3 SECONDS (ref 10.2–13)
RBC # BLD AUTO: 4.25 MILLION/UL (ref 3.9–5.2)
SODIUM SERPL-SCNC: 139 MMOL/L (ref 134–143)
TROPONIN I SERPL-MCNC: 0.03 NG/ML
TROPONIN I SERPL-MCNC: 0.03 NG/ML
WBC # BLD AUTO: 13.9 THOUSAND/UL (ref 4.8–10.8)

## 2019-07-24 PROCEDURE — 87040 BLOOD CULTURE FOR BACTERIA: CPT

## 2019-07-24 PROCEDURE — 83690 ASSAY OF LIPASE: CPT

## 2019-07-24 PROCEDURE — 99285 EMERGENCY DEPT VISIT HI MDM: CPT

## 2019-07-24 PROCEDURE — 71045 X-RAY EXAM CHEST 1 VIEW: CPT

## 2019-07-24 PROCEDURE — 85025 COMPLETE CBC W/AUTO DIFF WBC: CPT

## 2019-07-24 PROCEDURE — 85610 PROTHROMBIN TIME: CPT

## 2019-07-24 PROCEDURE — 80053 COMPREHEN METABOLIC PANEL: CPT

## 2019-07-24 PROCEDURE — 84484 ASSAY OF TROPONIN QUANT: CPT | Performed by: NURSE PRACTITIONER

## 2019-07-24 PROCEDURE — 83880 ASSAY OF NATRIURETIC PEPTIDE: CPT | Performed by: NURSE PRACTITIONER

## 2019-07-24 PROCEDURE — 83605 ASSAY OF LACTIC ACID: CPT

## 2019-07-24 PROCEDURE — 99223 1ST HOSP IP/OBS HIGH 75: CPT | Performed by: NURSE PRACTITIONER

## 2019-07-24 PROCEDURE — 85730 THROMBOPLASTIN TIME PARTIAL: CPT

## 2019-07-24 PROCEDURE — 71250 CT THORAX DX C-: CPT

## 2019-07-24 PROCEDURE — 93005 ELECTROCARDIOGRAM TRACING: CPT

## 2019-07-24 PROCEDURE — 1124F ACP DISCUSS-NO DSCNMKR DOCD: CPT | Performed by: INTERNAL MEDICINE

## 2019-07-24 PROCEDURE — C9113 INJ PANTOPRAZOLE SODIUM, VIA: HCPCS

## 2019-07-24 PROCEDURE — 74176 CT ABD & PELVIS W/O CONTRAST: CPT

## 2019-07-24 PROCEDURE — 36415 COLL VENOUS BLD VENIPUNCTURE: CPT

## 2019-07-24 PROCEDURE — 87070 CULTURE OTHR SPECIMN AEROBIC: CPT | Performed by: INTERNAL MEDICINE

## 2019-07-24 PROCEDURE — 84484 ASSAY OF TROPONIN QUANT: CPT

## 2019-07-24 PROCEDURE — 87205 SMEAR GRAM STAIN: CPT | Performed by: INTERNAL MEDICINE

## 2019-07-24 RX ORDER — PANTOPRAZOLE SODIUM 40 MG/1
40 INJECTION, POWDER, FOR SOLUTION INTRAVENOUS ONCE
Status: COMPLETED | OUTPATIENT
Start: 2019-07-24 | End: 2019-07-24

## 2019-07-24 RX ORDER — HYDRALAZINE HYDROCHLORIDE 20 MG/ML
10 INJECTION INTRAMUSCULAR; INTRAVENOUS EVERY 6 HOURS PRN
Status: DISCONTINUED | OUTPATIENT
Start: 2019-07-24 | End: 2019-07-30 | Stop reason: HOSPADM

## 2019-07-24 RX ORDER — PANTOPRAZOLE SODIUM 40 MG/1
40 INJECTION, POWDER, FOR SOLUTION INTRAVENOUS
Status: DISCONTINUED | OUTPATIENT
Start: 2019-07-25 | End: 2019-07-30 | Stop reason: HOSPADM

## 2019-07-24 RX ORDER — HEPARIN SODIUM 5000 [USP'U]/ML
5000 INJECTION, SOLUTION INTRAVENOUS; SUBCUTANEOUS EVERY 8 HOURS SCHEDULED
Status: DISCONTINUED | OUTPATIENT
Start: 2019-07-24 | End: 2019-07-30 | Stop reason: HOSPADM

## 2019-07-24 RX ORDER — ONDANSETRON 2 MG/ML
4 INJECTION INTRAMUSCULAR; INTRAVENOUS ONCE
Status: COMPLETED | OUTPATIENT
Start: 2019-07-24 | End: 2019-07-24

## 2019-07-24 RX ORDER — ONDANSETRON 2 MG/ML
4 INJECTION INTRAMUSCULAR; INTRAVENOUS EVERY 6 HOURS PRN
Status: DISCONTINUED | OUTPATIENT
Start: 2019-07-24 | End: 2019-07-30 | Stop reason: HOSPADM

## 2019-07-24 RX ORDER — SODIUM CHLORIDE 9 MG/ML
60 INJECTION, SOLUTION INTRAVENOUS CONTINUOUS
Status: DISCONTINUED | OUTPATIENT
Start: 2019-07-24 | End: 2019-07-26

## 2019-07-24 RX ORDER — ACETAMINOPHEN 325 MG/1
650 TABLET ORAL EVERY 6 HOURS PRN
Status: DISCONTINUED | OUTPATIENT
Start: 2019-07-24 | End: 2019-07-24

## 2019-07-24 RX ADMIN — SODIUM CHLORIDE 1000 ML: 0.9 INJECTION, SOLUTION INTRAVENOUS at 20:40

## 2019-07-24 RX ADMIN — SODIUM CHLORIDE 75 ML/HR: 9 INJECTION, SOLUTION INTRAVENOUS at 22:27

## 2019-07-24 RX ADMIN — PANTOPRAZOLE SODIUM 40 MG: 40 INJECTION, POWDER, LYOPHILIZED, FOR SOLUTION INTRAVENOUS at 19:50

## 2019-07-24 RX ADMIN — SODIUM CHLORIDE 1000 ML: 0.9 INJECTION, SOLUTION INTRAVENOUS at 19:32

## 2019-07-24 RX ADMIN — ONDANSETRON 4 MG: 2 INJECTION INTRAMUSCULAR; INTRAVENOUS at 19:50

## 2019-07-24 RX ADMIN — HEPARIN SODIUM 5000 UNITS: 5000 INJECTION, SOLUTION INTRAVENOUS; SUBCUTANEOUS at 22:27

## 2019-07-24 NOTE — ED PROVIDER NOTES
History  Chief Complaint   Patient presents with    Abdominal Pain     x7 days    Vomiting     per nursing home staff, pt has been having coffee ground emesis     Lilliam Dickson is an 66-year-old female from a local nursing home facility, was brought to the emergency department due to generalized abdominal pain associated with vomiting coffee-ground material today  There was no fever or chills  There was no diarrhea  On arrival in the emergency department patient was comfortable and not in any distress  Patient was offered analgesics but refused  History provided by:  Patient, nursing home and EMS personnel   used: No    Abdominal Pain   Pain location:  Generalized  Pain quality: cramping    Pain radiates to:  Does not radiate  Pain severity:  Mild  Onset quality:  Sudden  Duration: Today  Timing:  Sporadic  Progression:  Waxing and waning  Chronicity:  New  Context: not alcohol use, not awakening from sleep, not diet changes, not eating, not laxative use, not medication withdrawal, not previous surgeries, not recent illness, not recent sexual activity, not recent travel, not retching, not sick contacts, not suspicious food intake and not trauma    Relieved by:  Nothing  Worsened by:  Nothing  Ineffective treatments:  None tried  Associated symptoms: vomiting    Associated symptoms: no anorexia, no belching, no chest pain, no chills, no constipation, no cough, no diarrhea, no dysuria, no fatigue, no fever, no flatus, no hematemesis, no hematochezia, no hematuria, no melena, no nausea, no shortness of breath, no sore throat, no vaginal bleeding and no vaginal discharge    Vomiting:     Quality:  Coffee grounds    Number of occurrences:  Unable to specify    Severity:  Mild    Vomiting duration: Today  Timing:  Sporadic    Progression:  Improving  Risk factors: being elderly        Prior to Admission Medications   Prescriptions Last Dose Informant Patient Reported? Taking? aspirin (ECOTRIN LOW STRENGTH) 81 mg EC tablet   Yes No   Sig: Take 81 mg by mouth daily   furosemide (LASIX) 40 mg tablet   Yes No   Sig: Take 40 mg by mouth daily   furosemide (LASIX) 40 mg tablet   No No   Sig: Take 1 tablet (40 mg total) by mouth daily   levothyroxine 75 mcg tablet   Yes No   Sig: Take 75 mcg by mouth daily   lisinopril (ZESTRIL) 5 mg tablet   Yes No   Sig: Take 5 mg by mouth daily   nitrofurantoin (MACROBID) 100 mg capsule   Yes No   Sig: Take 100 mg by mouth 2 (two) times a day      Facility-Administered Medications: None       Past Medical History:   Diagnosis Date    Arthritis     [facility]congestive heart failure) [facility])     CKD (chronic kidney disease) stage 3, GFR 30-59 ml/min ([facility]) 2018    Disease of thyroid gland     Hypertension     MI, old     TIA (transient ischemic attack)        Past Surgical History:   Procedure Laterality Date    ANKLE FRACTURE SURGERY      APPENDECTOMY      CAROTID ARTERY - SUBCLAVIAN ARTERY BYPASS GRAFT      HYSTERECTOMY      TONSILLECTOMY         Family History   Problem Relation Age of Onset    Heart disease Brother      I have reviewed and agree with the history as documented  Social History     Tobacco Use    Smoking status: Former Smoker     Types: Cigarettes     Last attempt to quit:      Years since quittin 5    Smokeless tobacco: Never Used   Substance Use Topics    Alcohol use: No    Drug use: No        Review of Systems   Constitutional: Negative for chills, fatigue and fever  HENT: Negative for sore throat  Eyes: Negative  Respiratory: Negative for cough and shortness of breath  Cardiovascular: Negative for chest pain  Gastrointestinal: Positive for abdominal pain and vomiting  Negative for anorexia, constipation, diarrhea, flatus, hematemesis, hematochezia, melena and nausea  Endocrine: Negative  Genitourinary: Negative for dysuria, hematuria, vaginal bleeding and vaginal discharge     Musculoskeletal: Negative  Skin: Negative  Allergic/Immunologic: Negative  Neurological: Negative  Hematological: Negative  Psychiatric/Behavioral: Negative  Physical Exam  Physical Exam   Constitutional: She is oriented to person, place, and time  She appears well-developed and well-nourished  Non-toxic appearance  She does not appear ill  No distress  HENT:   Head: Normocephalic and atraumatic  Mouth/Throat: Oropharynx is clear and moist  No oropharyngeal exudate  Eyes: Pupils are equal, round, and reactive to light  EOM are normal  No scleral icterus  Cardiovascular: Normal rate, regular rhythm, normal heart sounds and intact distal pulses  Exam reveals no gallop and no friction rub  No murmur heard  Pulmonary/Chest: Effort normal and breath sounds normal  No stridor  No respiratory distress  She has no wheezes  She has no rales  Abdominal: Soft  Normal appearance  She exhibits distension  Bowel sounds are decreased  There is no tenderness  Neurological: She is alert and oriented to person, place, and time  Skin: Skin is warm and dry  No rash noted  She is not diaphoretic  No cyanosis or erythema  No pallor  Psychiatric: She has a normal mood and affect  Her behavior is normal    Nursing note and vitals reviewed        Vital Signs  ED Triage Vitals [07/24/19 1816]   Temp Pulse Respirations Blood Pressure SpO2   -- 92 16 140/72 92 %      Temp src Heart Rate Source Patient Position - Orthostatic VS BP Location FiO2 (%)   -- -- -- -- --      Pain Score       --           Vitals:    07/24/19 1816   BP: 140/72   Pulse: 92         Visual Acuity      ED Medications  Medications   sodium chloride 0 9 % bolus 1,000 mL (1,000 mL Intravenous New Bag 7/24/19 1932)   sodium chloride 0 9 % bolus 1,000 mL (has no administration in time range)   pantoprazole (PROTONIX) injection 40 mg (has no administration in time range)   phenol (CHLORASEPTIC) 1 4 % mucosal liquid 1 spray (has no administration in time range)   ondansetron (ZOFRAN) injection 4 mg (4 mg Intravenous Given 7/24/19 1950)   pantoprazole (PROTONIX) injection 40 mg (40 mg Intravenous Given 7/24/19 1950)       Diagnostic Studies  Results Reviewed     Procedure Component Value Units Date/Time    Lactic acid, plasma x2 [[id number]]  (Normal) Collected:  07/24/19 1940    Lab Status:  Final result Specimen:  Blood from Arm, Right Updated:  07/24/19 2013     LACTIC ACID 1 9 mmol/L     Narrative:       Result may be elevated if tourniquet was used during collection      Blood culture #1 [[id number]] Collected:  07/24/19 1952    Lab Status:  No result Specimen:  Blood from Arm, Right     Blood culture #2 [[id number]] Collected:  07/24/19 1952    Lab Status:  No result Specimen:  Blood from Arm, Left     Lipase [[id number]]  (Normal) Collected:  07/24/19 1838    Lab Status:  Final result Specimen:  Blood from Arm, Right Updated:  07/24/19 1922     Lipase 16 u/L     Comprehensive metabolic panel [[id number]]  (Abnormal) Collected:  07/24/19 1838    Lab Status:  Final result Specimen:  Blood from Arm, Right Updated:  07/24/19 1922     Sodium 139 mmol/L      Potassium 4 1 mmol/L      Chloride 99 mmol/L      CO2 31 mmol/L      ANION GAP 9 mmol/L      BUN 40 mg/dL      Creatinine 1 48 mg/dL      Glucose 170 mg/dL      Calcium 8 9 mg/dL      AST 27 U/L      ALT 55 U/L      Alkaline Phosphatase 73 U/L      Total Protein 6 5 g/dL      Albumin 3 5 g/dL      Total Bilirubin 0 50 mg/dL      eGFR 31 ml/min/1 73sq m     Narrative:       [place] guidelines for Chronic Kidney Disease (CKD):     Stage 1 with normal or high GFR (GFR > 90 mL/min/1 73 square meters)    Stage 2 Mild CKD (GFR = 60-89 mL/min/1 73 square meters)    Stage 3A Moderate CKD (GFR = 45-59 mL/min/1 73 square meters)    Stage 3B Moderate CKD (GFR = 30-44 mL/min/1 73 square meters)    Stage 4 Severe CKD (GFR = 15-29 mL/min/1 73 square meters)    Stage 5 End Stage CKD (GFR <15 mL/min/1 73 square meters)  Note: GFR calculation is accurate only with a steady state creatinine    Troponin I [[id number]]  (Normal) Collected:  07/24/19 1838    Lab Status:  Final result Specimen:  Blood from Arm, Right Updated:  07/24/19 1922     Troponin I 0 03 ng/mL     Troponin I [[id number]]     Lab Status:  No result Specimen:  Blood     APTT [[id number]]  (Normal) Collected:  07/24/19 1838    Lab Status:  Final result Specimen:  Blood from Arm, Right Updated:  07/24/19 1912     PTT 30 seconds     Protime-INR [[id number]]  (Normal) Collected:  07/24/19 1838    Lab Status:  Final result Specimen:  Blood from Arm, Right Updated:  07/24/19 1912     Protime 12 3 seconds      INR 1 06    Lactic acid, plasma x2 [[id number]]     Lab Status:  No result Specimen:  Blood     UA w Reflex to Microscopic w Reflex to Culture [[id number]]     Lab Status:  No result Specimen:  Urine, Clean Catch     CBC and differential [[id number]]  (Abnormal) Collected:  07/24/19 1838    Lab Status:  Final result Specimen:  Blood from Arm, Right Updated:  07/24/19 1850     WBC 13 90 Thousand/uL      RBC 4 25 Million/uL      Hemoglobin 12 3 g/dL      Hematocrit 37 2 %      MCV 88 fL      MCH 29 0 pg      MCHC 33 1 g/dL      RDW 15 2 %      MPV 7 7 fL      Platelets 486 Thousands/uL      Neutrophils Relative 85 %      Lymphocytes Relative 7 %      Monocytes Relative 8 %      Eosinophils Relative 0 %      Basophils Relative 0 %      Neutrophils Absolute 11 80 Thousands/µL      Lymphocytes Absolute 0 90 Thousands/µL      Monocytes Absolute 1 10 Thousand/µL      Eosinophils Absolute 0 00 Thousand/µL      Basophils Absolute 0 00 Thousands/µL                  CT chest abdomen pelvis wo contrast   Final Result by Kendy Scott MD (1933)   Prominent diffuse small bowel distention  The terminal ileum is decompressed  Findings are in keeping with small bowel obstruction   There is an acute caliber change of the small bowel #2/83#5/45 possibly secondary to an adhesion  Small bibasilar pleural effusions with mild upper lobe interlobular septal thickening and cardiomegaly in keeping with mild CHF  The study was marked in [facility] for immediate notification  Workstation performed: [id number]                    Procedures  ECG 12 Lead Documentation Only  Date/Time: 7/24/2019 6:51 PM  Performed by: Estela Vincent MD  Authorized by: Estela Vincent MD     Indications / Diagnosis:  Abdominal pain  ECG reviewed by me, the ED Provider: yes    Patient location:  ED  Previous ECG:     Previous ECG:  Unavailable    Comparison to cardiac monitor: Yes    Interpretation:     Interpretation: abnormal    Quality:     Tracing quality:  Limited by artifact  Rate:     ECG rate:  92 beats per minute    ECG rate assessment: normal    Rhythm:     Rhythm: sinus rhythm    Ectopy:     Ectopy: none    QRS:     QRS axis:  Normal    QRS intervals:  Normal  Conduction:     Conduction: normal    ST segments:     ST segments:  Non-specific  T waves:     T waves: non-specific    Other findings:     Other findings: early repolarization and LVH      General Procedure  Date/Time: 7/24/2019 8:25 PM  Performed by: Estela Vincent MD  Authorized by: Estela Vincent MD     Patient location:  ED and bedside  Assisting Provider(s): No    Consent:     Consent obtained:  Verbal    Consent given by:  Patient    Risks discussed:  Bleeding and pain  Indications:     Indications:  Small bowel obstruction  Anesthesia (see MAR for exact dosages): Anesthesia method:  None  Procedure Detail:     Equipment used: Insertion of nasogastric tube  Post-procedure details:     Patient tolerance of procedure: Tolerated well, no immediate complications           ED Course  ED Course as of Jul 24 2026 Wed Jul 24, 2019   3933 Patient is resting comfortably on the stretcher  Son is at the bedside    I discussed with the patient and her son the results of her blood work and the CT scan the chest abdomen and pelvis which revealed small bowel obstruction  I advised her that she will be admitted in the hospital       1941 Discussed with Dr Christiano Keene, Surgeon on call, about the positive findings in the CT of Chest, Abdomen and Pelvis  1942 Dr Christiano Keene recommended that the patient be admitted to the Hospitalist's service due to multiple medical problems  1948 Plan for admission was discussed with the Hospitalist on call and she agreed                                    MDM  Number of Diagnoses or Management Options  Small bowel obstruction [facility]): new and requires workup     Amount and/or Complexity of Data Reviewed  Clinical lab tests: ordered and reviewed  Tests in the radiology section of CPT®: ordered and reviewed  Tests in the medicine section of CPT®: ordered and reviewed  Discussion of test results with the performing providers: yes  Decide to obtain previous medical records or to obtain history from someone other than the patient: yes  Obtain history from someone other than the patient: yes  Review and summarize past medical records: yes  Discuss the patient with other providers: yes  Independent visualization of images, tracings, or specimens: yes    Risk of Complications, Morbidity, and/or Mortality  Presenting problems: moderate  Diagnostic procedures: moderate  Management options: moderate    Patient Progress  Patient progress: stable      Disposition  Final diagnoses:   Small bowel obstruction ([address] )     Time reflects when diagnosis was documented in both MDM as applicable and the Disposition within this note     Time User Action Codes Description Comment    7/24/2019  7:44 PM Ben Cunha Add [K56 609] Small bowel obstruction [facility])       ED Disposition     ED Disposition Condition Date/Time Comment    Admit Stable Wed Jul 24, 2019  7:49 PM Case was discussed with Hospitalist on call and Dr Christiano Keene, Surgeon on call and the patient's admission status was agreed to be Admission Status: inpatient status to the service of Dr Selvin Vanegas   Follow-up Information    None         Patient's Medications   Discharge Prescriptions    No medications on file     No discharge procedures on file      ED Provider  Electronically Signed by           Judy Limon MD  07/24/19 Silvia Ng MD  07/24/19 2026

## 2019-07-25 ENCOUNTER — APPOINTMENT (INPATIENT)
Dept: RADIOLOGY | Facility: HOSPITAL | Age: 84
DRG: 389 | End: 2019-07-25
Attending: SPECIALIST
Payer: MEDICARE

## 2019-07-25 ENCOUNTER — APPOINTMENT (INPATIENT)
Dept: NON INVASIVE DIAGNOSTICS | Facility: HOSPITAL | Age: 84
DRG: 389 | End: 2019-07-25
Payer: MEDICARE

## 2019-07-25 LAB
ALBUMIN SERPL BCP-MCNC: 3 G/DL (ref 3.5–5.7)
ALP SERPL-CCNC: 68 U/L (ref 55–165)
ALT SERPL W P-5'-P-CCNC: 40 U/L (ref 7–52)
ANION GAP SERPL CALCULATED.3IONS-SCNC: 8 MMOL/L (ref 4–13)
AST SERPL W P-5'-P-CCNC: 23 U/L (ref 13–39)
ATRIAL RATE: 92 BPM
BASOPHILS # BLD AUTO: 0 THOUSANDS/ΜL (ref 0–0.1)
BASOPHILS NFR BLD AUTO: 0 % (ref 0–2)
BILIRUB SERPL-MCNC: 0.5 MG/DL (ref 0.2–1)
BILIRUB UR QL STRIP: NEGATIVE
BUN SERPL-MCNC: 35 MG/DL (ref 7–25)
CALCIUM SERPL-MCNC: 7.9 MG/DL (ref 8.6–10.5)
CHLORIDE SERPL-SCNC: 105 MMOL/L (ref 98–107)
CLARITY UR: CLEAR
CO2 SERPL-SCNC: 27 MMOL/L (ref 21–31)
COLOR UR: YELLOW
CREAT SERPL-MCNC: 1.29 MG/DL (ref 0.6–1.2)
EOSINOPHIL # BLD AUTO: 0.1 THOUSAND/ΜL (ref 0–0.61)
EOSINOPHIL NFR BLD AUTO: 1 % (ref 0–5)
ERYTHROCYTE [DISTWIDTH] IN BLOOD BY AUTOMATED COUNT: 14.7 % (ref 11.5–14.5)
GFR SERPL CREATININE-BSD FRML MDRD: 37 ML/MIN/1.73SQ M
GLUCOSE SERPL-MCNC: 100 MG/DL (ref 65–99)
GLUCOSE UR STRIP-MCNC: NEGATIVE MG/DL
HCT VFR BLD AUTO: 32.8 % (ref 42–47)
HGB BLD-MCNC: 11 G/DL (ref 12–16)
HGB UR QL STRIP.AUTO: NEGATIVE
KETONES UR STRIP-MCNC: NEGATIVE MG/DL
LEUKOCYTE ESTERASE UR QL STRIP: NEGATIVE
LYMPHOCYTES # BLD AUTO: 1.1 THOUSANDS/ΜL (ref 0.6–4.47)
LYMPHOCYTES NFR BLD AUTO: 12 % (ref 21–51)
MAGNESIUM SERPL-MCNC: 2.3 MG/DL (ref 1.9–2.7)
MCH RBC QN AUTO: 29.7 PG (ref 26–34)
MCHC RBC AUTO-ENTMCNC: 33.5 G/DL (ref 31–37)
MCV RBC AUTO: 89 FL (ref 81–99)
MONOCYTES # BLD AUTO: 0.9 THOUSAND/ΜL (ref 0.17–1.22)
MONOCYTES NFR BLD AUTO: 10 % (ref 2–12)
NEUTROPHILS # BLD AUTO: 6.9 THOUSANDS/ΜL (ref 1.4–6.5)
NEUTS SEG NFR BLD AUTO: 77 % (ref 42–75)
NITRITE UR QL STRIP: NEGATIVE
P AXIS: 42 DEGREES
PH UR STRIP.AUTO: 5.5 [PH]
PHOSPHATE SERPL-MCNC: 3.1 MG/DL (ref 3–5.5)
PLATELET # BLD AUTO: 332 THOUSANDS/UL (ref 149–390)
PMV BLD AUTO: 9.2 FL (ref 8.6–11.7)
POTASSIUM SERPL-SCNC: 3.7 MMOL/L (ref 3.5–5.5)
PR INTERVAL: 150 MS
PROT SERPL-MCNC: 5.7 G/DL (ref 6.4–8.9)
PROT UR STRIP-MCNC: NEGATIVE MG/DL
QRS AXIS: -5 DEGREES
QRSD INTERVAL: 114 MS
QT INTERVAL: 348 MS
QTC INTERVAL: 430 MS
RBC # BLD AUTO: 3.7 MILLION/UL (ref 3.9–5.2)
SODIUM SERPL-SCNC: 140 MMOL/L (ref 134–143)
SP GR UR STRIP.AUTO: 1.02 (ref 1–1.03)
T WAVE AXIS: 167 DEGREES
TROPONIN I SERPL-MCNC: 0.04 NG/ML
UROBILINOGEN UR QL STRIP.AUTO: 0.2 E.U./DL
VENTRICULAR RATE: 92 BPM
WBC # BLD AUTO: 9 THOUSAND/UL (ref 4.8–10.8)

## 2019-07-25 PROCEDURE — G8979 MOBILITY GOAL STATUS: HCPCS

## 2019-07-25 PROCEDURE — G8987 SELF CARE CURRENT STATUS: HCPCS

## 2019-07-25 PROCEDURE — 74022 RADEX COMPL AQT ABD SERIES: CPT

## 2019-07-25 PROCEDURE — 97530 THERAPEUTIC ACTIVITIES: CPT

## 2019-07-25 PROCEDURE — 84484 ASSAY OF TROPONIN QUANT: CPT | Performed by: NURSE PRACTITIONER

## 2019-07-25 PROCEDURE — G8978 MOBILITY CURRENT STATUS: HCPCS

## 2019-07-25 PROCEDURE — 93010 ELECTROCARDIOGRAM REPORT: CPT | Performed by: INTERNAL MEDICINE

## 2019-07-25 PROCEDURE — G8989 SELF CARE D/C STATUS: HCPCS

## 2019-07-25 PROCEDURE — 81003 URINALYSIS AUTO W/O SCOPE: CPT | Performed by: NURSE PRACTITIONER

## 2019-07-25 PROCEDURE — 97163 PT EVAL HIGH COMPLEX 45 MIN: CPT

## 2019-07-25 PROCEDURE — 80053 COMPREHEN METABOLIC PANEL: CPT | Performed by: NURSE PRACTITIONER

## 2019-07-25 PROCEDURE — 83735 ASSAY OF MAGNESIUM: CPT | Performed by: NURSE PRACTITIONER

## 2019-07-25 PROCEDURE — 85025 COMPLETE CBC W/AUTO DIFF WBC: CPT | Performed by: NURSE PRACTITIONER

## 2019-07-25 PROCEDURE — 99222 1ST HOSP IP/OBS MODERATE 55: CPT | Performed by: SPECIALIST

## 2019-07-25 PROCEDURE — 84100 ASSAY OF PHOSPHORUS: CPT | Performed by: NURSE PRACTITIONER

## 2019-07-25 PROCEDURE — G8988 SELF CARE GOAL STATUS: HCPCS

## 2019-07-25 PROCEDURE — 97167 OT EVAL HIGH COMPLEX 60 MIN: CPT

## 2019-07-25 PROCEDURE — C9113 INJ PANTOPRAZOLE SODIUM, VIA: HCPCS | Performed by: NURSE PRACTITIONER

## 2019-07-25 PROCEDURE — 87086 URINE CULTURE/COLONY COUNT: CPT | Performed by: NURSE PRACTITIONER

## 2019-07-25 PROCEDURE — 99232 SBSQ HOSP IP/OBS MODERATE 35: CPT | Performed by: INTERNAL MEDICINE

## 2019-07-25 RX ORDER — FUROSEMIDE 10 MG/ML
20 INJECTION INTRAMUSCULAR; INTRAVENOUS DAILY
Status: DISCONTINUED | OUTPATIENT
Start: 2019-07-25 | End: 2019-07-28

## 2019-07-25 RX ADMIN — PANTOPRAZOLE SODIUM 40 MG: 40 INJECTION, POWDER, LYOPHILIZED, FOR SOLUTION INTRAVENOUS at 09:36

## 2019-07-25 RX ADMIN — FUROSEMIDE 20 MG: 10 INJECTION, SOLUTION INTRAMUSCULAR; INTRAVENOUS at 10:37

## 2019-07-25 RX ADMIN — HEPARIN SODIUM 5000 UNITS: 5000 INJECTION, SOLUTION INTRAVENOUS; SUBCUTANEOUS at 14:16

## 2019-07-25 RX ADMIN — HEPARIN SODIUM 5000 UNITS: 5000 INJECTION, SOLUTION INTRAVENOUS; SUBCUTANEOUS at 05:26

## 2019-07-25 RX ADMIN — SODIUM CHLORIDE 60 ML/HR: 9 INJECTION, SOLUTION INTRAVENOUS at 13:54

## 2019-07-25 RX ADMIN — HEPARIN SODIUM 5000 UNITS: 5000 INJECTION, SOLUTION INTRAVENOUS; SUBCUTANEOUS at 23:00

## 2019-07-25 NOTE — ED NOTES
3 attempts at NG tube insertion by this RN  2 attempts by ED provider until successful  Placement confirmed by auscultation  NG tube hooked to suction, no drainage  Provider made aware and presents bedside  NG tube placement again confirmed, no drainage at this time        John Benjamin RN  07/24/19 2038

## 2019-07-25 NOTE — NURSING NOTE
Dr Lara Wang called after reviewing results of obstruction series and asked RN to insert the NG tube 4 more inches  NG tube repositioned  Pt resting comfortably in bed  No complaints at this time  Will continue to monitor  Call bell in reach

## 2019-07-25 NOTE — ASSESSMENT & PLAN NOTE
Wt Readings from Last 3 Encounters:   07/25/19 66 1 kg (145 lb 12 8 oz)   08/27/18 60 3 kg (133 lb)   08/25/18 65 8 kg (145 lb)     · Unknown type  · Small bilateral pleural effusions and pulmonary vascular congestion   · Will check ECHO   · Daily weight   · Monitor IVF/volume status closely

## 2019-07-25 NOTE — ASSESSMENT & PLAN NOTE
· Likely due to SBO   · Patient is afebrile   · Will monitor closely   · Check procalcitonin  · Pending blood and urine cultures

## 2019-07-25 NOTE — ASSESSMENT & PLAN NOTE
· Baseline creatinine appears to be between 1 2-1 4 mg/dL   · Currently at baseline   · Will continue to monitor renal function closely   · Hold furosemide while NPO   · Avoid nephrotoxins

## 2019-07-25 NOTE — PLAN OF CARE
Problem: Potential for Falls  Goal: Patient will remain free of falls  Description  INTERVENTIONS:  - Assess patient frequently for physical needs  -  Identify cognitive and physical deficits and behaviors that affect risk of falls  -  [place] fall precautions as indicated by assessment   - Educate patient/family on patient safety including physical limitations  - Instruct patient to call for assistance with activity based on assessment  - Modify environment to reduce risk of injury  - Consider OT/PT consult to assist with strengthening/mobility  Outcome: Progressing     Problem: Prexisting or High Potential for Compromised Skin Integrity  Goal: Skin integrity is maintained or improved  Description  INTERVENTIONS:  - Identify patients at risk for skin breakdown  - Assess and monitor skin integrity  - Assess and monitor nutrition and hydration status  - Monitor labs (i e  albumin)  - Assess for incontinence   - Turn and reposition patient  - Assist with mobility/ambulation  - Relieve pressure over bony prominences  - Avoid friction and shearing  - Provide appropriate hygiene as needed including keeping skin clean and dry  - Evaluate need for skin moisturizer/barrier cream  - Collaborate with interdisciplinary team (i e  Nutrition, Rehabilitation, etc )   - Patient/family teaching  Outcome: Progressing     Problem: Nutrition/Hydration-ADULT  Goal: Nutrient/Hydration intake appropriate for improving, restoring or maintaining nutritional needs  Description  Monitor and assess patient's nutrition/hydration status for malnutrition (ex- brittle hair, bruises, dry skin, pale skin and conjunctiva, muscle wasting, smooth red tongue, and disorientation)  Collaborate with interdisciplinary team and initiate plan and interventions as ordered  Monitor patient's weight and dietary intake as ordered or per policy  Utilize nutrition screening tool and intervene per policy   Determine patient's food preferences and provide high-protein, high-caloric foods as appropriate       INTERVENTIONS:  - Monitor oral intake, urinary output, labs, and treatment plans  - Assess nutrition and hydration status and recommend course of action  - Evaluate amount of meals eaten  - Assist patient with eating if necessary   - Allow adequate time for meals  - Recommend/ encourage appropriate diets, oral nutritional supplements, and vitamin/mineral supplements  - Order, calculate, and assess calorie counts as needed  - Recommend, monitor, and adjust tube feedings and TPN/PPN based on assessed needs  - Assess need for intravenous fluids  - Provide specific nutrition/hydration education as appropriate  - Include patient/family/caregiver in decisions related to nutrition  Outcome: Progressing     Problem: PAIN - ADULT  Goal: Verbalizes/displays adequate comfort level or baseline comfort level  Description  Interventions:  - Encourage patient to monitor pain and request assistance  - Assess pain using appropriate pain scale  - Administer analgesics based on type and severity of pain and evaluate response  - Implement non-pharmacological measures as appropriate and evaluate response  - Consider cultural and social influences on pain and pain management  - Notify physician/advanced practitioner if interventions unsuccessful or patient reports new pain  Outcome: Progressing     Problem: INFECTION - ADULT  Goal: Absence or prevention of progression during hospitalization  Description  INTERVENTIONS:  - Assess and monitor for signs and symptoms of infection  - Monitor lab/diagnostic results  - Monitor all insertion sites, i e  indwelling lines, tubes, and drains  - Monitor endotracheal (as able) and nasal secretions for changes in amount and color  - [place] appropriate cooling/warming therapies per order  - Administer medications as ordered  - Instruct and encourage patient and family to use good hand hygiene technique  - Identify and instruct in appropriate isolation precautions for identified infection/condition  Outcome: Progressing  Goal: Absence of fever/infection during neutropenic period  Description  INTERVENTIONS:  - Monitor WBC  - Implement neutropenic guidelines  Outcome: Progressing     Problem: SAFETY ADULT  Goal: Patient will remain free of falls  Description  INTERVENTIONS:  - Assess patient frequently for physical needs  -  Identify cognitive and physical deficits and behaviors that affect risk of falls    -  [place] fall precautions as indicated by assessment   - Educate patient/family on patient safety including physical limitations  - Instruct patient to call for assistance with activity based on assessment  - Modify environment to reduce risk of injury  - Consider OT/PT consult to assist with strengthening/mobility  Outcome: Progressing  Goal: Maintain or return to baseline ADL function  Description  INTERVENTIONS:  -  Assess patient's ability to carry out ADLs; assess patient's baseline for ADL function and identify physical deficits which impact ability to perform ADLs (bathing, care of mouth/teeth, toileting, grooming, dressing, etc )  - Assess/evaluate cause of self-care deficits   - Assess range of motion  - Assess patient's mobility; develop plan if impaired  - Assess patient's need for assistive devices and provide as appropriate  - Encourage maximum independence but intervene and supervise when necessary  ¯ Involve family in performance of ADLs  ¯ Assess for home care needs following discharge   ¯ Request OT consult to assist with ADL evaluation and planning for discharge  ¯ Provide patient education as appropriate  Outcome: Progressing  Goal: Maintain or return mobility status to optimal level  Description  INTERVENTIONS:  - Assess patient's baseline mobility status (ambulation, transfers, stairs, etc )    - Identify cognitive and physical deficits and behaviors that affect mobility  - Identify mobility aids required to assist with transfers and/or ambulation (gait belt, sit-to-stand, lift, walker, cane, etc )  - [place] fall precautions as indicated by assessment  - Record patient progress and toleration of activity level on Mobility SBAR; progress patient to next Phase/Stage  - Instruct patient to call for assistance with activity based on assessment  - Request Rehabilitation consult to assist with strengthening/weightbearing, etc   Outcome: Progressing     Problem: DISCHARGE PLANNING  Goal: Discharge to home or other facility with appropriate resources  Description  INTERVENTIONS:  - Identify barriers to discharge w/patient and caregiver  - Arrange for needed discharge resources and transportation as appropriate  - Identify discharge learning needs (meds, wound care, etc )  - Arrange for interpretive services to assist at discharge as needed  - Refer to Case Management Department for coordinating discharge planning if the patient needs post-hospital services based on physician/advanced practitioner order or complex needs related to functional status, cognitive ability, or social support system  Outcome: Progressing     Problem: Knowledge Deficit  Goal: Patient/family/caregiver demonstrates understanding of disease process, treatment plan, medications, and discharge instructions  Description  Complete learning assessment and assess knowledge base    Interventions:  - Provide teaching at level of understanding  - Provide teaching via preferred learning methods  Outcome: Progressing

## 2019-07-25 NOTE — PLAN OF CARE
Problem: PHYSICAL THERAPY ADULT  Goal: Performs mobility at highest level of function for planned discharge setting  See evaluation for individualized goals  Description  Treatment/Interventions: Functional transfer training, LE strengthening/ROM, Therapeutic exercise, Endurance training, Patient/family training, Equipment eval/education, Bed mobility, Gait training, OT(&neuro re-education w/balance training)     See flowsheet documentation for full assessment, interventions and recommendations  [place] Abt, PT     Note:   Prognosis: Fair  Problem List: Decreased strength, Decreased endurance, Impaired balance, Decreased mobility, Impaired judgement, Decreased safety awareness  Assessment: Pt is 80 y o  female seen for PT evaluation s/p admit to [address] [place] [address] on 7/24/2019 w/ Small bowel obstruction ([facility] [address] )  PT consulted to assess pt's functional mobility and d/c needs  Order placed for PT eval and tx, w/ activity as tolerated order  Comorbidities affecting pt's physical performance at time of assessment include: weakness, small bowel obstruction, CKD, hypothryroidism, protein-calorie malnutrition,CHF, leukemoid reaction,hypothyroidism, HTN   PTA, pt was long term resident of SNF  Personal factors affecting pt at time of IE include: inability to ambulate household distances, inability to navigate community distances, inability to navigate level surfaces w/o external assistance, unable to perform dynamic tasks in community, decreased initiation and engagement, unable to perform physical activity, limited insight into impairments and inability to perform ADLs  Please find objective findings from PT assessment regarding body systems outlined above with impairments and limitations including weakness, impaired balance, decreased endurance, impaired coordination, gait deviations, decreased activity tolerance, decreased safety awareness, impaired judgement and fall risk   The following objective measures performed on IE also reveal limitations: Barthel Index: 30/100  Pt's clinical presentation is currently unstable/unpredictable  Pt to benefit from continued PT tx to address deficits as defined above and maximize level of functional independent mobility and consistency  From PT/mobility standpoint, recommendation at time of d/c would be return to SNF pending progress in order to facilitate return to PLOF  Recommendation: Long-term skilled nursing home placement(return to the [facility])     PT - OK to Discharge: No    See flowsheet documentation for full assessment     Grant Scott, PT

## 2019-07-25 NOTE — ASSESSMENT & PLAN NOTE
· Appreciate surgical input  · NG tube  · Keep the patient NPO  · Gentle IV fluid  · Follow-up obstruction series from this morning

## 2019-07-25 NOTE — ASSESSMENT & PLAN NOTE
· Will admit the patient to m/s  · Suspected to be due to adhesion  · Will consult surgery  · NG tube was placed in the ED   · Keep the patient NPO  · Gentle IV fluid  · Serial abdominal studies

## 2019-07-25 NOTE — H&P
H&P- Nirav Mata 5/24/1931, 80 y o  female MRN: [id number]    Unit/Bed#: ANGY Encounter: [id number]    Primary Care Provider: Giovanni Labs, DO   Date and time admitted to hospital: 7/24/2019  6:03 PM        * Small bowel obstruction ([address] )  Assessment & Plan  · Will admit the patient to m/s  · Suspected to be due to adhesion  · Will consult surgery  · NG tube was placed in the ED   · Keep the patient NPO  · Gentle IV fluid  · Serial abdominal studies     Congestive heart failure (CHF) (HCC)  Assessment & Plan  Wt Readings from Last 3 Encounters:   08/27/18 60 3 kg (133 lb)   08/25/18 65 8 kg (145 lb)   08/25/18 65 8 kg (145 lb 1 oz)     · Unknown type  · Small bilateral pleural effusions and pulmonary vascular congestion   · Will check ECHO   · Daily weight   · Monitor IVF/volume status closely         Leukemoid reaction  Assessment & Plan  · Likely due to SBO   · Patient is afebrile   · Will monitor closely   · Check procalcitonin  · Pending blood and urine cultures    Cognitive impairment  Assessment & Plan  · With forgetfulness   · Will continue supportive care and monitor closely     Mild protein-calorie malnutrition (HCC)  Assessment & Plan  Malnutrition Findings:           BMI Findings: There is no height or weight on file to calculate BMI     Nutrition consult       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, stage 2  Assessment & Plan  · POA   · Continue with local wound care   · Frequent turn and reposition     Hypothyroidism due to acquired atrophy of thyroid  Assessment & Plan  · Will hold levothyroxine while NPO    CKD (chronic kidney disease) stage 3, GFR 30-59 ml/min ([facility])  Assessment & Plan  · Baseline creatinine appears to be between 1 2-1 4 mg/dL   · Currently at baseline   · Will continue to monitor renal function closely   · Hold furosemide while NPO   · Avoid nephrotoxins    Essential hypertension  Assessment & Plan  · Will hold PO medication for now  · Add PRN hydralazine       VTE Prophylaxis: Heparin  Code Status: DNR/DNI- per [facility] record   POLST: POLST is not applicable to this patient  Discussion with family: message left with Lisbet Washington- son     Anticipated Length of Stay:  Patient will be admitted on an Inpatient basis with an anticipated length of stay of  > 2 midnights  Justification for Hospital Stay: SBO     Chief Complaint:   Abdominal pain and vomiting     History of Present Illness:    Edin Brooks is a 80 y o  female who presents with abdominal pain and vomiting  Patient is a resident of the [facility] and somewhat a poor historian  Information was obtained from ED staff and medical records  Patient states that she has been having nausea, vomiting and abdominal pain for the past week  She has not been eating or drink much  Staff reported that patient developed generalized abdominal pain "worst pain ever", associated with coffee-ground vomiting, no aggravating or relieving factors  Patient has no fever or chills or diarrhea  Currently, she denies any abdominal pain or nausea  CT a/p in ED shows small bowel obstruction  NGT was placed in ED  Review of Systems:  Review of Systems   Constitutional: Negative for activity change, appetite change, chills, diaphoresis and fever  HENT: Negative for congestion, ear pain, hearing loss, tinnitus and trouble swallowing  Eyes: Negative for photophobia, pain, discharge, itching and visual disturbance  Respiratory: Negative for cough, shortness of breath, wheezing and stridor  Cardiovascular: Negative for chest pain, palpitations and leg swelling  Gastrointestinal: Positive for nausea and vomiting  Negative for abdominal pain, blood in stool, constipation and diarrhea  Endocrine: Negative for cold intolerance, heat intolerance, polydipsia, polyphagia and polyuria  Genitourinary: Negative for difficulty urinating, dysuria, frequency, hematuria and urgency     Musculoskeletal: Negative for back pain, gait problem and neck stiffness  Skin: Negative for pallor, rash and wound  Allergic/Immunologic: Negative for environmental allergies, food allergies and immunocompromised state  Neurological: Negative for dizziness, tremors, speech difficulty, weakness, light-headedness, numbness and headaches  Hematological: Negative for adenopathy  Does not bruise/bleed easily  Psychiatric/Behavioral: Negative for hallucinations and sleep disturbance  The patient is not nervous/anxious  Past Medical and Surgical History:   Past Medical History:   Diagnosis Date    Arthritis     CHF (congestive heart failure) (HCC)     CKD (chronic kidney disease) stage 3, GFR 30-59 ml/min ([facility] [address] ) 8/25/2018    Disease of thyroid gland     Hypertension     MI, old     TIA (transient ischemic attack)        Past Surgical History:   Procedure Laterality Date    ANKLE FRACTURE SURGERY      APPENDECTOMY      CAROTID ARTERY - SUBCLAVIAN ARTERY BYPASS GRAFT      HYSTERECTOMY      TONSILLECTOMY         Meds/Allergies:  Prior to Admission medications    Medication Sig Start Date End Date Taking? Authorizing Provider   aspirin (ECOTRIN LOW STRENGTH) 81 mg EC tablet Take 81 mg by mouth daily    Historical Provider, MD   furosemide (LASIX) 40 mg tablet Take 40 mg by mouth daily    Historical Provider, MD   furosemide (LASIX) 40 mg tablet Take 1 tablet (40 mg total) by mouth daily 8/28/18   PAOLO Lester   levothyroxine 75 mcg tablet Take 75 mcg by mouth daily    Historical Provider, MD   lisinopril (ZESTRIL) 5 mg tablet Take 5 mg by mouth daily    Historical Provider, MD   nitrofurantoin (MACROBID) 100 mg capsule Take 100 mg by mouth 2 (two) times a day    Historical Provider, MD HARRIS have reveiwed home medications using records provided by [facility]  Allergies:    Allergies   Allergen Reactions    Penicillins        Social History:  Marital Status: /Civil Union   Occupation: retired   Patient Pre-hospital Living Situation: SNF  Patient Pre-hospital Level of Mobility: assist  Patient Pre-hospital Diet Restrictions: none   Substance Use History:     Social History     Substance and Sexual Activity   Alcohol Use No     Social History     Tobacco Use   Smoking Status Former Smoker    Types: Cigarettes    Last attempt to quit:     Years since quittin 5   Smokeless Tobacco Never Used     Social History     Substance and Sexual Activity   Drug Use No       Family History:  I have reviewed the patients family history    Physical Exam:   Vitals:   Blood Pressure: 142/72 (19)  Pulse: 82 (19)  Respirations: 19 (19)  SpO2: 95 % (19)    Physical Exam   Constitutional: She appears well-developed  No distress  Chronically ill and frail appearing elderly female    HENT:   Head: Normocephalic and atraumatic  Mouth/Throat: Oropharynx is clear and moist    Eyes: Pupils are equal, round, and reactive to light  Conjunctivae and EOM are normal    Neck: Normal range of motion  Neck supple  Cardiovascular: Normal rate, regular rhythm and normal heart sounds  Exam reveals no gallop and no friction rub  No murmur heard  Pulmonary/Chest: Effort normal  She has no wheezes  She has no rales  Decreased breath sounds in bases     Abdominal: Soft  She exhibits distension  There is no tenderness  There is no rebound and no guarding  Musculoskeletal: Normal range of motion  She exhibits no edema, tenderness or deformity  Neurological: She is alert  No cranial nerve deficit  Baseline cognitive impairment and forgetfulness   Skin: Skin is warm and dry  No rash noted  No erythema  Psychiatric: She has a normal mood and affect  Her behavior is normal    Vitals reviewed  Additional Data:   Lab Results: I have personally reviewed pertinent reports        Results from last 7 days   Lab Units 19  1838   WBC Thousand/uL 13 90*   HEMOGLOBIN g/dL 12 3   HEMATOCRIT % 37 2*   PLATELETS Thousands/uL 360   NEUTROS PCT % 85*   LYMPHS PCT % 7*   MONOS PCT % 8   EOS PCT % 0     Results from last 7 days   Lab Units 07/24/19  1838   POTASSIUM mmol/L 4 1   CHLORIDE mmol/L 99   CO2 mmol/L 31   BUN mg/dL 40*   CREATININE mg/dL 1 48*   CALCIUM mg/dL 8 9   ALK PHOS U/L 73   ALT U/L 55*   AST U/L 27     Results from last 7 days   Lab Units 07/24/19  1838   INR  1 06               Imaging: I have personally reviewed pertinent reports  CT chest abdomen pelvis wo contrast   Final Result by Rosy Mandel MD ([id number])   Prominent diffuse small bowel distention  The terminal ileum is decompressed  Findings are in keeping with small bowel obstruction  There is an acute caliber change of the small bowel #2/83#5/45 possibly secondary to an adhesion  Small bibasilar pleural effusions with mild upper lobe interlobular septal thickening and cardiomegaly in keeping with mild CHF  The study was marked in [facility] for immediate notification  Workstation performed: [id number]             EKG, Pathology, and Other Studies Reviewed on Admission:   · EKG: NSR HR 92    NetAccess/Epic Records Reviewed: Yes     ** Please Note: This note has been constructed using a voice recognition system   **

## 2019-07-25 NOTE — PLAN OF CARE
Problem: Potential for Falls  Goal: Patient will remain free of falls  Description  INTERVENTIONS:  - Assess patient frequently for physical needs  -  Identify cognitive and physical deficits and behaviors that affect risk of falls  -  [place] fall precautions as indicated by assessment   - Educate patient/family on patient safety including physical limitations  - Instruct patient to call for assistance with activity based on assessment  - Modify environment to reduce risk of injury  - Consider OT/PT consult to assist with strengthening/mobility  Outcome: Progressing     Problem: Prexisting or High Potential for Compromised Skin Integrity  Goal: Skin integrity is maintained or improved  Description  INTERVENTIONS:  - Identify patients at risk for skin breakdown  - Assess and monitor skin integrity  - Assess and monitor nutrition and hydration status  - Monitor labs (i e  albumin)  - Assess for incontinence   - Turn and reposition patient  - Assist with mobility/ambulation  - Relieve pressure over bony prominences  - Avoid friction and shearing  - Provide appropriate hygiene as needed including keeping skin clean and dry  - Evaluate need for skin moisturizer/barrier cream  - Collaborate with interdisciplinary team (i e  Nutrition, Rehabilitation, etc )   - Patient/family teaching  Outcome: Progressing     Problem: Nutrition/Hydration-ADULT  Goal: Nutrient/Hydration intake appropriate for improving, restoring or maintaining nutritional needs  Description  Monitor and assess patient's nutrition/hydration status for malnutrition (ex- brittle hair, bruises, dry skin, pale skin and conjunctiva, muscle wasting, smooth red tongue, and disorientation)  Collaborate with interdisciplinary team and initiate plan and interventions as ordered  Monitor patient's weight and dietary intake as ordered or per policy  Utilize nutrition screening tool and intervene per policy   Determine patient's food preferences and provide high-protein, high-caloric foods as appropriate       INTERVENTIONS:  - Monitor oral intake, urinary output, labs, and treatment plans  - Assess nutrition and hydration status and recommend course of action  - Evaluate amount of meals eaten  - Assist patient with eating if necessary   - Allow adequate time for meals  - Recommend/ encourage appropriate diets, oral nutritional supplements, and vitamin/mineral supplements  - Order, calculate, and assess calorie counts as needed  - Recommend, monitor, and adjust tube feedings and TPN/PPN based on assessed needs  - Assess need for intravenous fluids  - Provide specific nutrition/hydration education as appropriate  - Include patient/family/caregiver in decisions related to nutrition  Outcome: Progressing     Problem: PAIN - ADULT  Goal: Verbalizes/displays adequate comfort level or baseline comfort level  Description  Interventions:  - Encourage patient to monitor pain and request assistance  - Assess pain using appropriate pain scale  - Administer analgesics based on type and severity of pain and evaluate response  - Implement non-pharmacological measures as appropriate and evaluate response  - Consider cultural and social influences on pain and pain management  - Notify physician/advanced practitioner if interventions unsuccessful or patient reports new pain  Outcome: Progressing     Problem: INFECTION - ADULT  Goal: Absence or prevention of progression during hospitalization  Description  INTERVENTIONS:  - Assess and monitor for signs and symptoms of infection  - Monitor lab/diagnostic results  - Monitor all insertion sites, i e  indwelling lines, tubes, and drains  - Administer medications as ordered  - Instruct and encourage patient and family to use good hand hygiene technique  - Identify and instruct in appropriate isolation precautions for identified infection/condition   Outcome: Progressing  Goal: Absence of fever/infection during neutropenic period  Description  INTERVENTIONS:  - Monitor WBC     Outcome: Progressing     Problem: SAFETY ADULT  Goal: Patient will remain free of falls  Description  INTERVENTIONS:  - Assess patient frequently for physical needs  -  Identify cognitive and physical deficits and behaviors that affect risk of falls    -  [place] fall precautions as indicated by assessment   - Educate patient/family on patient safety including physical limitations  - Instruct patient to call for assistance with activity based on assessment  - Modify environment to reduce risk of injury  - Consider OT/PT consult to assist with strengthening/mobility  Outcome: Progressing  Goal: Maintain or return to baseline ADL function  Description  INTERVENTIONS:  -  Assess patient's ability to carry out ADLs; assess patient's baseline for ADL function and identify physical deficits which impact ability to perform ADLs (bathing, care of mouth/teeth, toileting, grooming, dressing, etc )  - Assess/evaluate cause of self-care deficits   - Assess range of motion  - Assess patient's mobility; develop plan if impaired  - Assess patient's need for assistive devices and provide as appropriate  - Encourage maximum independence but intervene and supervise when necessary  ¯ Involve family in performance of ADLs  ¯ Assess for home care needs following discharge   ¯ Request OT consult to assist with ADL evaluation and planning for discharge  ¯ Provide patient education as appropriate  Outcome: Progressing  Goal: Maintain or return mobility status to optimal level  Description  INTERVENTIONS:  - Assess patient's baseline mobility status (ambulation, transfers, stairs, etc )    - Identify cognitive and physical deficits and behaviors that affect mobility  - Identify mobility aids required to assist with transfers and/or ambulation (gait belt, sit-to-stand, lift, walker, cane, etc )  - [place] fall precautions as indicated by assessment  - Record patient progress and toleration of activity level on Mobility SBAR; progress patient to next Phase/Stage  - Instruct patient to call for assistance with activity based on assessment  - Request Rehabilitation consult to assist with strengthening/weightbearing, etc   Outcome: Progressing     Problem: DISCHARGE PLANNING  Goal: Discharge to home or other facility with appropriate resources  Description  INTERVENTIONS:  - Identify barriers to discharge w/patient and caregiver  - Arrange for needed discharge resources and transportation as appropriate  - Identify discharge learning needs (meds, wound care, etc )  - Arrange for interpretive services to assist at discharge as needed  - Refer to Case Management Department for coordinating discharge planning if the patient needs post-hospital services based on physician/advanced practitioner order or complex needs related to functional status, cognitive ability, or social support system  Outcome: Progressing     Problem: Knowledge Deficit  Goal: Patient/family/caregiver demonstrates understanding of disease process, treatment plan, medications, and discharge instructions  Description  Complete learning assessment and assess knowledge base    Interventions:  - Provide teaching at level of understanding  - Provide teaching via preferred learning methods  Outcome: Progressing     Problem: GASTROINTESTINAL - ADULT  Goal: Minimal or absence of nausea and/or vomiting  Description  INTERVENTIONS:  - Administer IV fluids as ordered to ensure adequate hydration  - Maintain NPO status until nausea and vomiting are resolved  - Nasogastric tube as ordered  - Administer ordered antiemetic medications as needed  - Provide nonpharmacologic comfort measures as appropriate  - Advance diet as tolerated, if ordered  - Nutrition services referral to assist patient with adequate nutrition and appropriate food choices  Outcome: Progressing  Goal: Maintains adequate nutritional intake  Description  INTERVENTIONS:  - Monitor percentage of each meal consumed  - Identify factors contributing to decreased intake, treat as appropriate  - Assist with meals as needed  - Monitor I&O, WT and lab values  - Obtain nutrition services referral as needed  Outcome: Progressing     Problem: METABOLIC, FLUID AND ELECTROLYTES - ADULT  Goal: Electrolytes maintained within normal limits  Description  INTERVENTIONS:  - Monitor labs and assess patient for signs and symptoms of electrolyte imbalances  - Administer electrolyte replacement as ordered  - Monitor response to electrolyte replacements, including repeat lab results as appropriate  - Instruct patient on fluid and nutrition as appropriate  Outcome: Progressing  Goal: Fluid balance maintained  Description  INTERVENTIONS:  - Monitor labs and assess for signs and symptoms of volume excess or deficit  - Monitor I/O and WT  - Instruct patient on fluid and nutrition as appropriate  Outcome: Progressing     Problem: SKIN/TISSUE INTEGRITY - ADULT  Goal: Skin integrity remains intact  Description  INTERVENTIONS  - Identify patients at risk for skin breakdown  - Assess and monitor skin integrity  - Assess and monitor nutrition and hydration status  - Monitor labs (i e  albumin)  - Assess for incontinence   - Turn and reposition patient  - Assist with mobility/ambulation  - Relieve pressure over bony prominences  - Avoid friction and shearing  - Provide appropriate hygiene as needed including keeping skin clean and dry  - Evaluate need for skin moisturizer/barrier cream  - Collaborate with interdisciplinary team (i e  Nutrition, Rehabilitation, etc )   - Patient/family teaching  Outcome: Progressing

## 2019-07-25 NOTE — SOCIAL WORK
CM met with pt at bedside and explained role  Pt is a long term resident at [facility] and is a 15 days bed hold per [facility]  CM left message for pt kavon Moreon to confirm return to [place] is discharge plan when pt medically cleared  CM to follow  CM reviewed d/c planning process including the following: identifying help at home, patient preference for d/c planning needs, availability of treatment team to discuss questions or concerns patient and/or family may have regarding understanding medications and recognizing signs and symptoms once discharged  CM also encouraged patient to follow up with all recommended appointments after discharge  Patient advised of importance for patient and family to participate in managing patients medical well being

## 2019-07-25 NOTE — ASSESSMENT & PLAN NOTE
Malnutrition Findings:           BMI Findings: There is no height or weight on file to calculate BMI     Nutrition consult

## 2019-07-25 NOTE — CONSULTS
Consultation - General Surgery   Austin Handley 80 y o  female MRN: [id number]  Unit/Bed#: -01 Encounter: [id number]    Assessment/Plan     Assessment:  The patient is an 49-year-old white female from a local nursing home presenting to the emergency room with abdominal pain distention nausea and vomiting  The patient states that her last bowel movement was on Monday of a 3 days prior  She denies any melena or bright red blood per rectum  She denies any hematemesis  She has had some relief with placement of a nasogastric tube  CT scan demonstrates a bowel obstruction with a transition point in the distal ileum  Plan:  Follow-up obstruction series this a m , Gastrografin via nasogastric tube for a limited small bowel series would then be of value in predicting whether laparotomy will be necessary  Continue nasogastric tube to low continuous suction  Continue prophylaxis with IV Protonix  History of Present Illness     History, ROS and PFSH obtained from records, and the patient, who appears to be a reliable historian  HPI:  Austin Handley is a 80 y o  female in her usual state of health, with a history of hypertension, chronic kidney disease stage 3, congestive heart failure, and a distant history of toxic encephalopathy  The patient is a resident of a local skilled nursing home  She is brought to the emergency room on Wednesday evening with generalized colicky abdominal pain associated with vomiting coffee ground material   Patient's last bowel movement was 48 hours prior to presentation  She denies any melena or bright red blood per rectum  Denies any sonny hematemesis  CT scan on admission shows diffusely dilated small bowel with a transition point in the terminal ileum, suspected of being an adhesive small-bowel obstruction  Inpatient consult to Acute Care Surgery  Consult performed by:  Lizette Rogers MD  Consult ordered by: PAOLO Bray          Review of Systems   Constitutional: Negative for chills, fever and unexpected weight change  HENT: Negative for trouble swallowing  Respiratory: Positive for cough  Negative for shortness of breath  Cardiovascular: Negative for chest pain and palpitations  Gastrointestinal: Positive for abdominal distention, abdominal pain, nausea and vomiting  Negative for blood in stool, diarrhea and rectal pain  Genitourinary: Negative for difficulty urinating  Musculoskeletal: Positive for arthralgias  Skin: Negative for color change  Neurological: Negative for tremors  Psychiatric/Behavioral: Negative for agitation and confusion         Historical Information   Past Medical History:   Diagnosis Date    Arthritis     [facility]     CKD (chronic kidney disease) stage 3, GFR 30-59 ml/min [facility] 2018    Disease of thyroid gland     Hypertension     MI, old     Pressure injury of skin     TIA (transient ischemic attack)      Past Surgical History:   Procedure Laterality Date    ANKLE FRACTURE SURGERY      APPENDECTOMY      CAROTID ARTERY - SUBCLAVIAN ARTERY BYPASS GRAFT      HYSTERECTOMY      TONSILLECTOMY       Social History   Social History     Substance and Sexual Activity   Alcohol Use Never    Frequency: Never     Social History     Substance and Sexual Activity   Drug Use Never     Social History     Tobacco Use   Smoking Status Former Smoker    Types: Cigarettes    Last attempt to quit: 1985    Years since quittin 5   Smokeless Tobacco Never Used     Family History:   Family History   Problem Relation Age of Onset    Heart disease Brother        Meds/Allergies   current meds:   Current Facility-Administered Medications   Medication Dose Route Frequency    heparin (porcine) subcutaneous injection 5,000 Units  5,000 Units Subcutaneous Q8H [address]    hydrALAZINE (APRESOLINE) injection 10 mg  10 mg Intravenous Q6H PRN    ondansetron (ZOFRAN) injection 4 mg  4 mg Intravenous Q6H PRN    pantoprazole (PROTONIX) injection 40 mg  40 mg Intravenous Q24H [facility]    sodium chloride 0 9 % infusion  75 mL/hr Intravenous Continuous     Allergies   Allergen Reactions    Penicillins      Unknown reaction    Tylenol [Acetaminophen] Dizziness     spinning sensation        Objective   First Vitals:   Blood Pressure: 140/72 (07/24/19 1816)  Pulse: 92 (07/24/19 1816)  Temperature: 97 8 °F (36 6 °C) (07/24/19 2115)  Temp Source: Tympanic (07/24/19 2115)  Respirations: 16 (07/24/19 1816)  Height: 5' 7" (170 2 cm) (07/24/19 2115)  Weight - Scale: 66 1 kg (145 lb 11 6 oz) (07/24/19 2115)  SpO2: 92 % (07/24/19 1816)    Current Vitals:   Blood Pressure: 139/65 (07/25/19 0730)  Pulse: 76 (07/24/19 2115)  Temperature: 97 9 °F (36 6 °C) (07/25/19 0730)  Temp Source: Temporal (07/25/19 0730)  Respirations: 18 (07/25/19 0730)  Height: 5' 7" (170 2 cm) (07/24/19 2115)  Weight - Scale: 66 1 kg (145 lb 12 8 oz) (07/25/19 0600)  SpO2: 94 % (07/25/19 0730)      Intake/Output Summary (Last 24 hours) at 7/25/2019 0677  Last data filed at 7/24/2019 2227  Gross per 24 hour   Intake 1000 ml   Output    Net 1000 ml       Invasive Devices     Peripheral Intravenous Line            Peripheral IV 07/24/19 Left Antecubital less than 1 day    Peripheral IV 07/24/19 Right Antecubital less than 1 day          Drain            NG/OG/Enteral Tube Nasogastric Right nares 1 day                Physical Exam   Constitutional: She is oriented to person, place, and time  She appears well-developed  Frail elderly WF,appears comfortable with NG tube in place   HENT:   Head: Normocephalic and atraumatic  Eyes: No scleral icterus  Neck: Neck supple  Cardiovascular: Normal rate, regular rhythm and normal heart sounds  Pulmonary/Chest: Effort normal and breath sounds normal  No respiratory distress  Abdominal: She exhibits distension (Tympanic to percussion)  She exhibits no mass  There is no tenderness   There is no rebound and no guarding  No hernia  Genitourinary:   Genitourinary Comments: Deferred   Musculoskeletal: She exhibits no edema  Frail   Neurological: She is alert and oriented to person, place, and time  Skin: Skin is warm and dry  Psychiatric: She has a normal mood and affect  Lab Results:   I have personally reviewed pertinent lab results  , CBC:   Lab Results   Component Value Date    WBC 9 00 07/25/2019    HGB 11 0 (L) 07/25/2019    HCT 32 8 (L) 07/25/2019    MCV 89 07/25/2019     07/25/2019    MCH 29 7 07/25/2019    MCHC 33 5 07/25/2019    RDW 14 7 (H) 07/25/2019    MPV 9 2 07/25/2019   , CMP:   Lab Results   Component Value Date    SODIUM 140 07/25/2019    K 3 7 07/25/2019     07/25/2019    CO2 27 07/25/2019    BUN 35 (H) 07/25/2019    CREATININE 1 29 (H) 07/25/2019    CALCIUM 7 9 (L) 07/25/2019    AST 23 07/25/2019    ALT 40 07/25/2019    ALKPHOS 68 07/25/2019    EGFR 37 07/25/2019   , Lipase:   Lab Results   Component Value Date    LIPASE 16 07/24/2019     Imaging: I have personally reviewed pertinent reports  and I have personally reviewed pertinent films in PACS  EKG, Pathology, and Other Studies: I have personally reviewed pertinent reports  Counseling / Coordination of Care  Total floor / unit time spent today 25 minutes  Greater than 50% of total time was spent with the patient and / or family counseling and / or coordination of care  A description of the counseling / coordination of care: Taking the patient history, entering orders

## 2019-07-25 NOTE — ASSESSMENT & PLAN NOTE
Wt Readings from Last 3 Encounters:   08/27/18 60 3 kg (133 lb)   08/25/18 65 8 kg (145 lb)   08/25/18 65 8 kg (145 lb 1 oz)     · Unknown type  · Small bilateral pleural effusions and pulmonary vascular congestion   · Will check ECHO   · Daily weight   · Monitor IVF/volume status closely

## 2019-07-25 NOTE — UTILIZATION REVIEW
Initial Clinical Review    Admission: Date/Time/Statement: 7/24/19 @ [address]  [facility] This Encounter   Procedures    Inpatient Admission (expected length of stay for this patient Order details is greater than two midnights)     Standing Status:   Standing     Number of Occurrences:   1     Order Specific Question:   Admitting Physician     Answer:   Clarita Merino [[id number]]     Order Specific Question:   Level of Care     Answer:   Med Surg [[id number]]     Order Specific Question:   Estimated length of stay     Answer:   More than 2 Midnights     Order Specific Question:   Certification     Answer:   I certify that inpatient services are medically necessary for this patient for a duration of greater than two midnights  See H&P and MD Progress Notes for additional information about the patient's course of treatment  ED Arrival Information     Expected Arrival Acuity Means of Arrival Escorted By Service Admission Type    - 7/24/2019 18:02 Urgent Ambulance [facility] General Medicine Urgent    Arrival Complaint    Abd Pain        Chief Complaint   Patient presents with    Abdominal Pain     x7 days    Vomiting     per nursing home staff, pt has been having coffee ground emesis     Assessment/Plan:  79 yo female presents to ED via EMS from SNF with abdominal pain and vomiting coffee ground material today  CT a/p in ED shows small bowel obstruction  NGT was placed in ED  Admitted to IP with SBO, CHF  Consult Surgery, NPO, IVF, serial abdominal studies  For CHF - Check ECHO, Monitor IVF/volume status closely  WBC's elevated but pt is afebrile - check procalcitonin, Cx's pending  CKD @ baseline  Per SX 7/25: F/U with Obstruction series this am to eval if laparotomy will be needed  Continue nasogastric tube to low continuous suction  Continue prophylaxis with IV Protonix      ED Triage Vitals   Temperature Pulse Respirations Blood Pressure SpO2   07/24/19 2115 07/24/19 1816 07/24/19 1816 07/24/19 1816 07/24/19 1816   97 8 °F (36 6 °C) 92 16 140/72 92 %      Temp Source Heart Rate Source Patient Position - Orthostatic VS BP Location FiO2 (%)   07/24/19 2115 07/24/19 2039 07/24/19 2115 07/24/19 2115 --   Tympanic Monitor Lying Left arm       Pain Score       07/24/19 2241       No Pain        Wt Readings from Last 1 Encounters:   07/25/19 66 1 kg (145 lb 12 8 oz)     Additional Vital Signs:   07/25/19 0730  97 9 °F (36 6 °C)    18  139/65  94 %  Nasal cannula Lying   07/24/19 2115  97 8 °F (36 6 °C)  76  20  170/74  94 %  Nasal cannula Lying       Pertinent Labs/Diagnostic Test Results:   Lab Units 07/25/19  0526 07/24/19  1838   WBC Thousand/uL 9 00 13 90*   HEMOGLOBIN g/dL 11 0* 12 3   HEMATOCRIT % 32 8* 37 2*   PLATELETS Thousands/uL 332 360   NEUTROS ABS Thousands/µL 6 90* 11 80*     Lab Units 07/25/19  0526 07/24/19  1838   SODIUM mmol/L 140 139   POTASSIUM mmol/L 3 7 4 1   CHLORIDE mmol/L 105 99   CO2 mmol/L 27 31   ANION GAP mmol/L 8 9   BUN mg/dL 35* 40*   CREATININE mg/dL 1 29* 1 48*   EGFR ml/min/1 73sq m 37 31   CALCIUM mg/dL 7 9* 8 9   MAGNESIUM mg/dL 2 3  --    PHOSPHORUS mg/dL 3 1  --      Results from last 7 days   Lab Units 07/25/19  0526 07/24/19  1838   AST U/L 23 27   ALT U/L 40 55*   ALK PHOS U/L 68 73   TOTAL PROTEIN g/dL 5 7* 6 5   ALBUMIN g/dL 3 0* 3 5   TOTAL BILIRUBIN mg/dL 0 50 0 50     Lab Units 07/25/19  0526 07/24/19  1838   GLUCOSE RANDOM mg/dL 100* 170*     Results from last 7 days   Lab Units 07/25/19  0028 07/24/19 2219 07/24/19  1838   TROPONIN I ng/mL 0 04* 0 03 0 03     Results from last 7 days   Lab Units 07/24/19  1838   PROTIME seconds 12 3   INR  1 06   PTT seconds 30     Results from last 7 days   Lab Units 07/24/19  1940   LACTIC ACID mmol/L 1 9     Results from last 7 days   Lab Units 07/24/19  2219   BNP pg/mL 431*     Results from last 7 days   Lab Units 07/24/19  1838   LIPASE u/L 16     Lab Units 07/25/19  0621   CLARITY UA  Clear   COLOR UA  Yellow   SPEC GRAV UA 1 025   PH UA  5 5   GLUCOSE UA mg/dl Negative   KETONES UA mg/dl Negative   BLOOD UA  Negative   PROTEIN UA mg/dl Negative   NITRITE UA  Negative   BILIRUBIN UA  Negative   UROBILINOGEN UA E U /dl 0 2   LEUKOCYTES UA  Negative   WBC UA /hpf  --    RBC UA /hpf  --    BACTERIA UA /hpf  --    EPITHELIAL CELLS WET PREP /hpf  --      Results from last 7 days   Lab Units 07/24/19  0450 07/20/19  1424   GRAM STAIN RESULT  1+ Epithelial cells per low power field*  Rare Polys*  2+ Gram positive cocci in pairs, chains and clusters*  1+ Gram positive rods*  1+ Gram negative rods*  --    URINE CULTURE   --  40,000-49,000 cfu/ml      7/24 BC's pending    7/24 CXR: Pulmonary vascular congestion and small bilateral pleural effusions      7/24 CT C/A/P: Prominent diffuse small bowel distention  The terminal ileum is decompressed  Findings are in keeping with small bowel obstruction  There is an acute caliber change of the small bowel #2/83#5/45 possibly secondary to an adhesion    Small bibasilar pleural effusions with mild upper lobe interlobular septal thickening and cardiomegaly in keeping with mild CHF     7/25: Abd Obstructive Series  Pending    ED Treatment:   Medication Administration from 07/24/2019 1802 to 07/24/2019 2113       Date/Time Order Dose Route Action     07/24/2019 1932 sodium chloride 0 9 % bolus 1,000 mL 1,000 mL Intravenous New Bag     07/24/2019 1950 ondansetron (ZOFRAN) injection 4 mg 4 mg Intravenous Given     07/24/2019 1950 pantoprazole (PROTONIX) injection 40 mg 40 mg Intravenous Given     07/24/2019 2040 sodium chloride 0 9 % bolus 1,000 mL 1,000 mL Intravenous New Bag        Past Medical History:   Diagnosis Date    Arthritis     CHF (congestive heart failure) (HCC)     CKD (chronic kidney disease) stage 3, GFR 30-59 ml/min ([facility] [address] ) 8/25/2018    Disease of thyroid gland     Hypertension     MI, old     Pressure injury of skin     TIA (transient ischemic attack)      Present on Admission:   Small bowel obstruction (HCC)   Mild protein-calorie malnutrition (HCC)   Hypothyroidism due to acquired atrophy of thyroid   Essential hypertension   CKD (chronic kidney disease) stage 3, GFR 30-59 ml/min (HCC)   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, stage 2      Admitting Diagnosis: Small bowel obstruction ([facility] [address] ) [K56 609]  Abdominal pain [R10 9]  Age/Sex: 80 y o  female     Admission Orders:  Current Facility-Administered Medications:  furosemide 20 mg Intravenous Daily    heparin (porcine) 5,000 Units Subcutaneous Q8H [address]    hydrALAZINE 10 mg Intravenous Q6H PRN    ondansetron 4 mg Intravenous Q6H PRN    pantoprazole 40 mg Intravenous Q24H [address]    sodium chloride 60 mL/hr Intravenous Continuous      ECHO  SCD's  NGT to LIS;   NPO  IP CONSULT TO [address] [place] [address] Utilization Review Department  Phone: [phone]; Fax [phone]  [email or web address]  ATTENTION: Please call with any questions or concerns to [phone]  and carefully listen to the prompts so that you are directed to the right person  Send all requests for admission clinical reviews, approved or denied determinations and any other requests to fax [phone]   All voicemails are confidential

## 2019-07-25 NOTE — PHYSICAL THERAPY NOTE
Physical Therapy Evaluation     Patient's Name: Dori Kraus    Admitting Diagnosis  Small bowel obstruction ([facility] [address] ) [K56 609]  Abdominal pain [R10 9]    Problem List  Patient Active Problem List   Diagnosis    Acute cystitis without hematuria    Non-traumatic rhabdomyolysis    Essential hypertension    CKD (chronic kidney disease) stage 3, GFR 30-59 ml/min (HCC)    Hypothyroidism due to acquired atrophy of thyroid    Toxic encephalopathy    Elevated troponin    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, stage 2    Mild protein-calorie malnutrition (HCC)    Small bowel obstruction (HCC)    Congestive heart failure (CHF) (HCC)    Leukemoid reaction    Cognitive impairment       Past Medical History  Past Medical History:   Diagnosis Date    Arthritis     CHF (congestive heart failure) ([facility] [address] )     CKD (chronic [facility] stage [facility], [facility] 30-59 ml[facility] 8/25/2018    Disease of thyroid gland     Hypertension     MI, old     Pressure injury of skin     TIA (transient ischemic attack)        Past Surgical History  Past Surgical History:   Procedure Laterality Date    ANKLE FRACTURE SURGERY      APPENDECTOMY      CAROTID ARTERY - SUBCLAVIAN ARTERY BYPASS GRAFT      HYSTERECTOMY      TONSILLECTOMY          07/25/19 6954   Note Type   Note type Eval/Treat   Pain Assessment   Pain Assessment No/denies pain   Pain Score No Pain   Home Living   Type of Home SNF  ([facility])   Home Layout One level;Performs ADLs on one level; Able to live on main level with bedroom/bathroom; Access; Ramped entrance;Elevator   Bathroom Shower/Tub Walk-in shower   Bathroom Toilet Raised   Bathroom Equipment Grab bars in shower;Grab bars around toilet; Shower chair;Commode   Bathroom Accessibility Accessible via wheelchair;Accessible via walker   [address] [facility]; Wheelchair-manual   Additional Comments PTA, pt  required A of 1 w/RW for AMB     Prior Function   Level of [place] Needs assistance with IADLs; Needs assistance with ADLs and functional mobility; Other (Comment)   Lives With Facility staff   Receives Help From Personal care attendant   ADL Assistance Needs assistance   IADLs Needs assistance   Falls in the last 6 months 1 to 4   Vocational Retired   Restrictions/Precautions   [employer] Bearing Precautions Per Order No   Other Precautions Fall Risk;O2;Hard of hearing  (NG tube, 3 L O2 does not typically utilize)   General   Family/Caregiver Present No   Cognition   Arousal/Participation Lethargic   Attention Attends with cues to redirect   Orientation Level Oriented X4   Memory Decreased recall of precautions   Following Commands Follows one step commands with increased time or repetition   RUE Assessment   RUE Assessment X   LUE Assessment   LUE Assessment X   RLE Assessment   RLE Assessment X   Strength RLE   R Hip Flexion 3-/5   R Hip Extension 3-/5   R Knee Flexion 3-/5   R Knee Extension 3-/5   LLE Assessment   LLE Assessment X   Strength LLE   L Hip Flexion 3-/5   L Hip Extension 3-/5   L Knee Flexion 3-/5   L Knee Extension 3-/5   Coordination   Movements are Fluid and Coordinated 1   Bed Mobility   Rolling R 3  Moderate assistance   Additional items Assist x 1;Bedrails; Increased time required;Verbal cues;LE management   Rolling L 3  Moderate assistance   Additional items Assist x 1;Bedrails; Increased time required;Verbal cues;LE management   Supine to Sit 3  Moderate assistance   Additional items Assist x 1;Bedrails; Increased time required;Verbal cues;LE management   Sit to Supine 3  Moderate assistance   Additional items Assist x 1; Increased time required;Verbal cues;LE management; Bedrails   Transfers   Sit to Stand Unable to assess  (2/2 safety concerns w/accompanied lethargy)   Ambulation/Elevation   Gait pattern Not tested  (at this time)   Balance   Static Sitting Fair   Dynamic Sitting Fair -   Static Standing   (unable to assess)   Endurance Deficit   Endurance Deficit Yes   Endurance Deficit Description fatigue present w/ minimal mobility   Activity Tolerance   Activity Tolerance Patient limited by fatigue   Assessment   Prognosis Fair   Problem List Decreased strength;Decreased endurance; Impaired balance;Decreased mobility; Impaired judgement;Decreased safety awareness   Assessment Pt is 80 y o  female seen for PT evaluation s/p admit to [facility] on 7/24/2019 w/ Small bowel obstruction ([address] )  PT consulted to assess pt's functional mobility and d/c needs  Order placed for PT eval and tx, w/ activity as tolerated order  Comorbidities affecting pt's physical performance at time of assessment include: weakness, small bowel obstruction, CKD, hypothryroidism, protein-calorie malnutrition,CHF, leukemoid reaction,hypothyroidism, HTN   PTA, pt was long term resident of SNF  Personal factors affecting pt at time of IE include: inability to ambulate household distances, inability to navigate community distances, inability to navigate level surfaces w/o external assistance, unable to perform dynamic tasks in community, decreased initiation and engagement, unable to perform physical activity, limited insight into impairments and inability to perform ADLs  Please find objective findings from PT assessment regarding body systems outlined above with impairments and limitations including weakness, impaired balance, decreased endurance, impaired coordination, gait deviations, decreased activity tolerance, decreased safety awareness, impaired judgement and fall risk  The following objective measures performed on IE also reveal limitations: Barthel Index: 30/100  Pt's clinical presentation is currently unstable/unpredictable  Pt to benefit from continued PT tx to address deficits as defined above and maximize level of functional independent mobility and consistency   From PT/mobility standpoint, recommendation at time of d/c would be return to SNF pending progress in order to facilitate return to PLOF  Goals   Patient Goals feel better, get some sleep   LTG Expiration Date 08/04/19   Long Term Goal #1 1 )Patient will complete bed mobility with supervision of 1 for decrease need for caregiver assistance, decrease burden of care  2 ) Patient will complete transfers with min A of 1 to decrease risk of falls, facilitate upright standing posture  3 ) BLE strength to greater than/equal to 3+/5 gross musculature to increase ability to safely transfer, control descent to chair  4 ) Patient will exhibit increase static standing balance to Fair , for 1-2 minutes without LOB and min A  of 1 to improve activity tolerance and endurance  5 ) Patient will exhibit increase dynamic ambulatory balance to Fair for 50 feet w/RW min A of 1 to improve ability to mobilize to toilet, chair and decrease risk for additional medical complications  6 ) Patient will exhibit good self monitoring and ability to follow 2 step commands to increase complexity of tasks and resume ADL's without LOB  Treatment Day 1   Plan   Treatment/Interventions Functional transfer training;LE strengthening/ROM; Therapeutic exercise; Endurance training;Patient/family training;Equipment eval/education; Bed mobility;Gait training;OT  (&neuro re-education w/balance training)   PT Frequency 2-3x/wk   Recommendation   Recommendation Long-term skilled nursing home placement  (return to the [facility])   PT - OK to Discharge No   Additional Comments Upon conclusion, patient was resting in bed w/all needs within reach  Barthel Index   Feeding 5   Bathing 0   Grooming Score 0   Dressing Score 5   Bladder Score 5   Bowels Score 5   Toilet Use Score 5   Transfers (Bed/Chair) Score 5   Mobility (Level Surface) Score 0   Stairs Score 0   Barthel Index Score 30     Additional skilled interventions: Therapeutic Activity x 10 minutes    S: No reported pain prior or during session, lethargic throughout session      O: therapeutic activity x 10 minutes including mobilization education: bed mobility, supine<->sit, static/dynamic sitting balance w/ postural facilitation,  balance training w/ postural facilitation, and continued safety training for increased environmental awareness  A: Patient exhibited severe weakness, impaired balance, decreased endurance, activity intolerance, and decline from PLOF to benefit from continued interventions  P: Continue PT tx with progression of functional tasks as patient can safely tolerate, 2-3x/week      Neeru Hooper, PT

## 2019-07-25 NOTE — PROGRESS NOTES
Progress Note - Jannie Grapes 5/24/1931, 80 y o  female MRN: [id number]    Unit/Bed#: -01 Encounter: [id number]    Primary Care Provider: Ko Phillip DO   Date and time admitted to hospital: 7/24/2019  6:03 PM        Cognitive impairment  Assessment & Plan  · With forgetfulness   · Will continue supportive care and monitor closely     Leukemoid reaction  Assessment & Plan  · Likely due to SBO   · Patient is afebrile   · Now resolved    Congestive heart failure (CHF) (HCC)  Assessment & Plan  Wt Readings from Last 3 Encounters:   07/25/19 66 1 kg (145 lb 12 8 oz)   08/27/18 60 3 kg (133 lb)   08/25/18 65 8 kg (145 lb)     · Unknown type  · Small bilateral pleural effusions and pulmonary vascular congestion   · Will check ECHO   · Daily weight   · Monitor IVF/volume status closely         Mild protein-calorie malnutrition (HCC)  Assessment & Plan  Malnutrition Findings:           BMI Findings: Body mass index is 22 84 kg/m²     Nutrition consult       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, stage 2  Assessment & Plan  · POA   · Continue with local wound care   · Frequent turn and reposition     Hypothyroidism due to acquired atrophy of thyroid  Assessment & Plan  · Will hold levothyroxine while NPO    CKD (chronic kidney disease) stage 3, GFR 30-59 ml/min ([facility])  Assessment & Plan  · Baseline creatinine appears to be between 1 2-1 4 mg/dL   · Currently at baseline   · Will continue to monitor renal function closely   · Hold furosemide while NPO   · Avoid nephrotoxins    Essential hypertension  Assessment & Plan  · Will hold PO medication for now  · Add PRN hydralazine     * Small bowel obstruction (HCC)  Assessment & Plan  · Appreciate surgical input  · NG tube  · Keep the patient NPO  · Gentle IV fluid  · Follow-up obstruction series from this morning        VTE Pharmacologic Prophylaxis: Pharmacologic: Heparin    Patient Centered Rounds: I have performed bedside rounds with nursing staff today  Discussions with Specialists or Other Care Team Provider: n/a  Education and Discussions with Family / Patient: patient    Current Length of Stay: 1 day(s)    Current Patient Status: Inpatient   Certification Statement: The patient will continue to require additional inpatient hospital stay due to sbo    Discharge Plan: pending hospital course    Code Status: Level 1 - Full Code    Subjective:   Patient seen examined  No acute events overnight  Patient notes her pain is well controlled  No BMs or flatus    Objective:     Vitals:   Temp (24hrs), Av 9 °F (36 6 °C), Min:97 8 °F (36 6 °C), Max:97 9 °F (36 6 °C)    Temp:  [97 8 °F (36 6 °C)-97 9 °F (36 6 °C)] 97 9 °F (36 6 °C)  HR:  [76-95] 76  Resp:  [16-20] 18  BP: (139-170)/(65-74) 139/65  SpO2:  [92 %-95 %] 94 %  Body mass index is 22 84 kg/m²  Input and Output Summary (last 24 hours): Intake/Output Summary (Last 24 hours) at 2019 0945  Last data filed at 2019 2227  Gross per 24 hour   Intake 1000 ml   Output    Net 1000 ml       Physical Exam:     Physical Exam   Constitutional: She appears well-developed and well-nourished  HENT:   Head: Normocephalic and atraumatic  NG tube in place   Eyes: Pupils are equal, round, and reactive to light  EOM are normal    Neck: Normal range of motion  Neck supple  Cardiovascular: Normal rate and regular rhythm  Pulmonary/Chest: Effort normal    Decreased breath sounds at bases bilaterally   Abdominal: Soft  She exhibits distension  Hypoactive bowel sounds   Musculoskeletal: Normal range of motion  Neurological: She is alert  Skin: Skin is warm  Capillary refill takes less than 2 seconds  Psychiatric: She has a normal mood and affect  Her behavior is normal  Thought content normal    Nursing note and vitals reviewed        Additional Data:     Labs:    Results from last 7 days   Lab Units 19  0526   WBC Thousand/uL 9 00   HEMOGLOBIN g/dL 11 0*   HEMATOCRIT % 32 8*   PLATELETS Thousands/uL 332   NEUTROS PCT % 77*   LYMPHS PCT % 12*   MONOS PCT % 10   EOS PCT % 1     Results from last 7 days   Lab Units 07/25/19  0526   POTASSIUM mmol/L 3 7   CHLORIDE mmol/L 105   CO2 mmol/L 27   BUN mg/dL 35*   CREATININE mg/dL 1 29*   CALCIUM mg/dL 7 9*   ALK PHOS U/L 68   ALT U/L 40   AST U/L 23     Results from last 7 days   Lab Units 07/24/19  1838   INR  1 06               * I Have Reviewed All Lab Data Listed Above  * Additional Pertinent Lab Tests Reviewed: [address] Admission  Reviewed    Imaging:  Imaging Reports Reviewed Today Include: abdominal xray    Recent Cultures (last 7 days):     Results from last 7 days   Lab Units 07/24/19  0450 07/20/19  1424   GRAM STAIN RESULT  1+ Epithelial cells per low power field*  Rare Polys*  2+ Gram positive cocci in pairs, chains and clusters*  1+ Gram positive rods*  1+ Gram negative rods*  --    URINE CULTURE   --  40,000-49,000 cfu/ml        Last 24 Hours Medication List:     Current Facility-Administered Medications:  heparin (porcine) 5,000 Units Subcutaneous [facility] PAOLO Saul    hydrALAZINE 10 mg Intravenous Q6H PRN Cara Dilan, JAKNP    ondansetron 4 mg Intravenous Q6H PRN Cara Custard, CRNP    pantoprazole 40 mg Intravenous Q24H [address] Cara Custjoseph, JAKNP    sodium chloride 60 mL/hr Intravenous Continuous Moe Ramsey MD Last Rate: 75 mL/hr (07/24/19 2227)        Today, Patient Was Seen By: Moe Ramsey MD    ** Please Note: Dictation voice to text software may have been used in the creation of this document   **

## 2019-07-25 NOTE — OCCUPATIONAL THERAPY NOTE
Occupational Therapy Evaluation      Annamaria Luz Elena    7/25/2019    Patient Active Problem List   Diagnosis    Acute cystitis without hematuria    Non-traumatic rhabdomyolysis    Essential hypertension    CKD (chronic kidney disease) stage 3, GFR 30-59 ml/min (formerly [facility])    Hypothyroidism due to acquired atrophy of thyroid    Toxic encephalopathy    Elevated troponin    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, stage 2    Mild protein-calorie malnutrition (HCC)    Small bowel obstruction (HCC)    Congestive heart failure (CHF) (HCC)    Leukemoid reaction    Cognitive impairment       Past Medical History:   Diagnosis Date    Arthritis     CHF (congestive heart failure) (HCC)     CKD (chronic kidney disease) stage 3, GFR 30-59 ml/min ([facility] [address] ) 8/25/2018    Disease of thyroid gland     Hypertension     MI, old     Pressure injury of skin     TIA (transient ischemic attack)        Past Surgical History:   Procedure Laterality Date    ANKLE FRACTURE SURGERY      APPENDECTOMY      CAROTID ARTERY - SUBCLAVIAN ARTERY BYPASS GRAFT      HYSTERECTOMY      TONSILLECTOMY          07/25/19 1224   Note Type   Note type Eval only   Restrictions/Precautions   Weight Bearing Precautions Per Order No   Other Precautions Fall Risk;O2;Hard of hearing  (NPO, NG tube)   Pain Assessment   Pain Assessment 0-10   Home Living   Type of Home SNF   Prior Function   Level of [place] Needs assistance with ADLs and functional mobility   Receives Help From Personal care attendant   Falls in the last 6 months 1 to 4   ADL   Eating Deficit NPO   Grooming Assistance 2  Maximal Assistance   UB Dressing Assistance 2  Maximal Assistance   LB Dressing Assistance 1  Total Assistance   Toileting Assistance  1  Total Assistance   RUE Assessment   RUE Assessment X   LUE Assessment   LUE Assessment X   Hand Function   Gross Motor Coordination Impaired   Fine Motor Coordination Impaired   Cognition Overall Cognitive Status Impaired   Arousal/Participation Lethargic   Orientation Level Oriented to place;Oriented to person   Memory Decreased short term memory   Following Commands Follows one step commands with increased time or repetition   Assessment   Assessment Pt is a 80 y o  female seen for OT evaluation s/p admit to [facility] on 7/24/2019 w/ Small bowel obstruction ([address] )  Comorbidities affecting pt's functional performance at time of assessment include: HTN, limited cognition and CHF  Personal factors affecting pt at time of IE include:difficulty performing ADLS and limited insight into deficits  Prior to admission, pt was living in skilled nursing environment receiving assistance with all ADL's and mobility  Upon evaluation: Pt requires extensive assist with ADL's and mobility  Pt would not benefit from continued skilled OT tx while in the hospital   Recommend return to SNF when medically stable     Goals   Patient Goals get some rest   Barthel Index   Feeding 0   Bathing 0   Grooming Score 0   Dressing Score 0   Bladder Score 0   Bowels Score 5   Toilet Use Score 0   Transfers (Bed/Chair) Score 5   Mobility (Level Surface) Score 0   Stairs Score 5   Barthel Index Score 15   Claire Ocampo, OT

## 2019-07-26 ENCOUNTER — APPOINTMENT (INPATIENT)
Dept: RADIOLOGY | Facility: HOSPITAL | Age: 84
DRG: 389 | End: 2019-07-26
Attending: SPECIALIST
Payer: MEDICARE

## 2019-07-26 ENCOUNTER — APPOINTMENT (INPATIENT)
Dept: NON INVASIVE DIAGNOSTICS | Facility: HOSPITAL | Age: 84
DRG: 389 | End: 2019-07-26
Payer: MEDICARE

## 2019-07-26 LAB
ANION GAP SERPL CALCULATED.3IONS-SCNC: 10 MMOL/L (ref 4–13)
BACTERIA SPT RESP CULT: ABNORMAL
BACTERIA SPT RESP CULT: ABNORMAL
BACTERIA UR CULT: NORMAL
BUN SERPL-MCNC: 33 MG/DL (ref 7–25)
CALCIUM SERPL-MCNC: 8.3 MG/DL (ref 8.6–10.5)
CHLORIDE SERPL-SCNC: 106 MMOL/L (ref 98–107)
CO2 SERPL-SCNC: 28 MMOL/L (ref 21–31)
CREAT SERPL-MCNC: 1.19 MG/DL (ref 0.6–1.2)
ERYTHROCYTE [DISTWIDTH] IN BLOOD BY AUTOMATED COUNT: 14.9 % (ref 11.5–14.5)
GFR SERPL CREATININE-BSD FRML MDRD: 41 ML/MIN/1.73SQ M
GLUCOSE SERPL-MCNC: 56 MG/DL (ref 65–99)
GRAM STN SPEC: ABNORMAL
HCT VFR BLD AUTO: 33.3 % (ref 42–47)
HGB BLD-MCNC: 11.1 G/DL (ref 12–16)
MCH RBC QN AUTO: 29.9 PG (ref 26–34)
MCHC RBC AUTO-ENTMCNC: 33.3 G/DL (ref 31–37)
MCV RBC AUTO: 90 FL (ref 81–99)
PLATELET # BLD AUTO: 345 THOUSANDS/UL (ref 149–390)
PMV BLD AUTO: 9.5 FL (ref 8.6–11.7)
POTASSIUM SERPL-SCNC: 3.6 MMOL/L (ref 3.5–5.5)
RBC # BLD AUTO: 3.71 MILLION/UL (ref 3.9–5.2)
SODIUM SERPL-SCNC: 144 MMOL/L (ref 134–143)
WBC # BLD AUTO: 9.3 THOUSAND/UL (ref 4.8–10.8)

## 2019-07-26 PROCEDURE — 99232 SBSQ HOSP IP/OBS MODERATE 35: CPT | Performed by: INTERNAL MEDICINE

## 2019-07-26 PROCEDURE — 97530 THERAPEUTIC ACTIVITIES: CPT

## 2019-07-26 PROCEDURE — 93306 TTE W/DOPPLER COMPLETE: CPT

## 2019-07-26 PROCEDURE — 85027 COMPLETE CBC AUTOMATED: CPT | Performed by: INTERNAL MEDICINE

## 2019-07-26 PROCEDURE — 97110 THERAPEUTIC EXERCISES: CPT

## 2019-07-26 PROCEDURE — C9113 INJ PANTOPRAZOLE SODIUM, VIA: HCPCS | Performed by: NURSE PRACTITIONER

## 2019-07-26 PROCEDURE — 93306 TTE W/DOPPLER COMPLETE: CPT | Performed by: INTERNAL MEDICINE

## 2019-07-26 PROCEDURE — 80048 BASIC METABOLIC PNL TOTAL CA: CPT | Performed by: INTERNAL MEDICINE

## 2019-07-26 PROCEDURE — 99232 SBSQ HOSP IP/OBS MODERATE 35: CPT | Performed by: SPECIALIST

## 2019-07-26 PROCEDURE — 74250 X-RAY XM SM INT 1CNTRST STD: CPT

## 2019-07-26 RX ORDER — DEXTROSE AND SODIUM CHLORIDE 5; .45 G/100ML; G/100ML
75 INJECTION, SOLUTION INTRAVENOUS CONTINUOUS
Status: DISCONTINUED | OUTPATIENT
Start: 2019-07-26 | End: 2019-07-28

## 2019-07-26 RX ADMIN — HEPARIN SODIUM 5000 UNITS: 5000 INJECTION, SOLUTION INTRAVENOUS; SUBCUTANEOUS at 22:04

## 2019-07-26 RX ADMIN — DEXTROSE AND SODIUM CHLORIDE 75 ML/HR: 5; 450 INJECTION, SOLUTION INTRAVENOUS at 10:09

## 2019-07-26 RX ADMIN — FUROSEMIDE 20 MG: 10 INJECTION, SOLUTION INTRAMUSCULAR; INTRAVENOUS at 10:09

## 2019-07-26 RX ADMIN — PANTOPRAZOLE SODIUM 40 MG: 40 INJECTION, POWDER, LYOPHILIZED, FOR SOLUTION INTRAVENOUS at 10:09

## 2019-07-26 RX ADMIN — HEPARIN SODIUM 5000 UNITS: 5000 INJECTION, SOLUTION INTRAVENOUS; SUBCUTANEOUS at 05:33

## 2019-07-26 RX ADMIN — HEPARIN SODIUM 5000 UNITS: 5000 INJECTION, SOLUTION INTRAVENOUS; SUBCUTANEOUS at 14:13

## 2019-07-26 NOTE — SOCIAL WORK
Pt not yet stable for discharge  Discharge plan remains for pt to return to [place]  CM to follow

## 2019-07-26 NOTE — PROGRESS NOTES
Progress Note - Rai Jones 5/24/1931, 80 y o  female MRN: [id number]    Unit/Bed#: -01 Encounter: [id number]    Primary Care Provider: Thomasine Meckel, DO   Date and time admitted to hospital: 7/24/2019  6:03 PM        * Small bowel obstruction ([facility] [address] )  Assessment & Plan  · Appreciate surgical input  · Continue NG tube  · Keep the patient NPO  · Gentle IV fluid  · Persistent obstruction noted on previous obstruction series  · Defer to surgery regarding further recommendations    Leukemoid reaction  Assessment & Plan  · Likely due to SBO   · Patient is afebrile   · Now resolved    Congestive heart failure (CHF) ([facility] [address] )  Assessment & Plan  Wt Readings from Last 3 Encounters:   07/26/19 64 9 kg (143 lb 1 3 oz)   08/27/18 60 3 kg (133 lb)   08/25/18 65 8 kg (145 lb)     · Unknown type  · Small bilateral pleural effusions and pulmonary vascular congestion   · Follow-up ECHO read  · Daily weight   · Monitor IVF/volume status closely   · Home Lasix resumed        Mild protein-calorie malnutrition ([facility] [address] )  Assessment & Plan  Malnutrition Findings:           BMI Findings: Body mass index is 22 41 kg/m²     Nutrition consult       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, stage 2  Assessment & Plan  · POA   · Continue with local wound care   · Frequent turn and reposition     Hypothyroidism due to acquired atrophy of thyroid  Assessment & Plan  · Will hold levothyroxine while NPO    CKD (chronic kidney disease) stage 3, GFR 30-59 ml/min ([facility])  Assessment & Plan  · Baseline creatinine appears to be between 1 2-1 4 mg/dL   · Currently at baseline   · Will continue to monitor renal function closely   · I resumed home regimen of Lasix  · Avoid nephrotoxins    Essential hypertension  Assessment & Plan  · Will hold PO medication for now  · Add PRN hydralazine         VTE Pharmacologic Prophylaxis: Pharmacologic: Heparin    Patient Centered Rounds: I have performed bedside rounds with nursing staff today  Discussions with Specialists or Other Care Team Provider: n/a  Education and Discussions with Family / Patient: patient    Current Length of Stay: 2 day(s)    Current Patient Status: Inpatient   Certification Statement: The patient will continue to require additional inpatient hospital stay due to sbo    Discharge Plan: pending hospital course    Code Status: Level 1 - Full Code    Subjective:   Patient seen examined  No acute events were noted  No bowel movements as of yet    Objective:     Vitals:   Temp (24hrs), Av 9 °F (36 6 °C), Min:97 6 °F (36 4 °C), Max:98 3 °F (36 8 °C)    Temp:  [97 6 °F (36 4 °C)-98 3 °F (36 8 °C)] 97 8 °F (36 6 °C)  HR:  [68-86] 68  Resp:  [17-18] 18  BP: (125-155)/(53-67) 135/63  SpO2:  [96 %-100 %] 96 %  Body mass index is 22 41 kg/m²  Input and Output Summary (last 24 hours): Intake/Output Summary (Last 24 hours) at 2019 0933  Last data filed at 2019 0804  Gross per 24 hour   Intake 100 ml   Output 900 ml   Net -800 ml       Physical Exam:     Physical Exam   Constitutional: She appears well-developed and well-nourished  HENT:   Head: Normocephalic and atraumatic  NG tube in place   Eyes: Pupils are equal, round, and reactive to light  EOM are normal    Neck: Normal range of motion  Neck supple  Cardiovascular: Normal rate and regular rhythm  Pulmonary/Chest: Effort normal and breath sounds normal    Abdominal: Soft  She exhibits distension  Hypoactive bowel sounds   Musculoskeletal: Normal range of motion  She exhibits edema  Neurological: She is alert  Oriented to person and place   Skin: Skin is warm  Capillary refill takes less than 2 seconds  Psychiatric: She has a normal mood and affect  Her behavior is normal  Judgment and thought content normal    Nursing note and vitals reviewed        Additional Data:     Labs:    Results from last 7 days   Lab Units 19  0532 19  0526   WBC Thousand/uL 9 30 9 00 HEMOGLOBIN g/dL 11 1* 11 0*   HEMATOCRIT % 33 3* 32 8*   PLATELETS Thousands/uL 345 332   NEUTROS PCT %  --  77*   LYMPHS PCT %  --  12*   MONOS PCT %  --  10   EOS PCT %  --  1     Results from last 7 days   Lab Units 07/26/19  0532 07/25/19  0526   POTASSIUM mmol/L 3 6 3 7   CHLORIDE mmol/L 106 105   CO2 mmol/L 28 27   BUN mg/dL 33* 35*   CREATININE mg/dL 1 19 1 29*   CALCIUM mg/dL 8 3* 7 9*   ALK PHOS U/L  --  68   ALT U/L  --  40   AST U/L  --  23     Results from last 7 days   Lab Units 07/24/19  1838   INR  1 06               * I Have Reviewed All Lab Data Listed Above  * Additional Pertinent Lab Tests Reviewed: [address] Admission  Reviewed    Imaging:  Imaging Reports Reviewed Today Include: obstruction series    Recent Cultures (last 7 days):     Results from last 7 days   Lab Units 07/25/19  0621 07/24/19  0450 07/20/19  1424   SPUTUM CULTURE   --  1+ Growth of   Commensal respiratory juanita only; No significant growth of Staph aureus/MRSA or Pseudomonas aeruginosa    --    GRAM STAIN RESULT   --  1+ Epithelial cells per low power field*  Rare Polys*  2+ Gram positive cocci in pairs, chains and clusters*  1+ Gram positive rods*  1+ Gram negative rods*  --    URINE CULTURE  20,000-29,000 cfu/ml   --  40,000-49,000 cfu/ml        Last 24 Hours Medication List:     Current Facility-Administered Medications:  dextrose 5 % and sodium chloride 0 45 % 75 mL/hr Intravenous Continuous Allan Glaser MD   furosemide 20 mg Intravenous Daily Allan Glaser MD   heparin (porcine) 5,000 Units Subcutaneous [facility] Trace Héctor, CRNP   hydrALAZINE 10 mg Intravenous Q6H PRN Trace Héctor, CRNP   ondansetron 4 mg Intravenous Q6H PRN Trace Héctor, CRNP   pantoprazole 40 mg Intravenous Q24H [facility] Trace Héctor, CRNP   phenol 1 spray Mouth/Throat Q2H PRN Angel Thomas MD        Today, Patient Was Seen By: Allan Glaser MD    ** Please Note: Dictation voice to text software may have been used in the creation of this document   **

## 2019-07-26 NOTE — PROGRESS NOTES
Progress Note - General Surgery   Jamar Ho 80 y o  female MRN: [id number]  Unit/Bed#: -01 Encounter: [id number]    Assessment:  Patient appears comfortable  Claims that the abdominal pain has resolved, and that she is passing gas  No BM, however  NG tube with 900 cc of bilious drainage after repositioning NG tube yesterday  Plan:  Modified gastrografin small bowel series today  If contrast reaches the colon, remove NG tube and start clear liquids    Subjective/Objective   Chief Complaint: Irritation from NG tube    Subjective: Appears more alert and comfortable    Objective:     Blood pressure 135/63, pulse 68, temperature 97 8 °F (36 6 °C), temperature source Tympanic, resp  rate 18, height 5' 7" (1 702 m), weight 64 9 kg (143 lb 1 3 oz), SpO2 96 %  ,Body mass index is 22 41 kg/m²        Intake/Output Summary (Last 24 hours) at 7/26/2019 1208  Last data filed at 7/26/2019 1208  Gross per 24 hour   Intake 80 ml   Output 900 ml   Net -820 ml       Invasive Devices     Peripheral Intravenous Line            Peripheral IV 07/24/19 Left Antecubital 1 day          Drain            NG/OG/Enteral Tube Nasogastric Right nares 2 days                Physical Exam:   Alert, comfortable  Abdomen soft, flat, non-tender with hypoactive bowel sounds    Lab, Imaging and other studies:  CBC:   Lab Results   Component Value Date    WBC 9 30 07/26/2019    HGB 11 1 (L) 07/26/2019    HCT 33 3 (L) 07/26/2019    MCV 90 07/26/2019     07/26/2019    MCH 29 9 07/26/2019    MCHC 33 3 07/26/2019    RDW 14 9 (H) 07/26/2019    MPV 9 5 07/26/2019   , CMP:   Lab Results   Component Value Date    SODIUM 144 (H) 07/26/2019    K 3 6 07/26/2019     07/26/2019    CO2 28 07/26/2019    BUN 33 (H) 07/26/2019    CREATININE 1 19 07/26/2019    CALCIUM 8 3 (L) 07/26/2019    EGFR 41 07/26/2019     VTE Pharmacologic Prophylaxis: Heparin  VTE Mechanical Prophylaxis: sequential compression device

## 2019-07-26 NOTE — ASSESSMENT & PLAN NOTE
· Baseline creatinine appears to be between 1 2-1 4 mg/dL   · Currently at baseline   · Will continue to monitor renal function closely   · I resumed home regimen of Lasix  · Avoid nephrotoxins

## 2019-07-26 NOTE — PHYSICAL THERAPY NOTE
07/26/19 0849   Pain Assessment   Pain Assessment No/denies pain   Pain Score No Pain   Restrictions/Precautions   Weight Bearing Precautions Per Order No   Other Precautions Hard of hearing; Fall Risk   General   Family/Caregiver Present No   Cognition   Overall Cognitive Status Impaired   Arousal/Participation Lethargic; Alert;Arousable; Cooperative   Attention Attends with cues to redirect   Orientation Level Oriented X4   Memory Decreased short term memory;Decreased recall of recent events   Following Commands Follows one step commands with increased time or repetition   Subjective   Subjective "I want to come back over" to the [place]   Bed Mobility   Rolling R 4  Minimal assistance   Additional items Assist x 1;Bedrails; Increased time required;Verbal cues   Rolling L 4  Minimal assistance   Additional items Assist x 1;Bedrails   Supine to Sit 3  Moderate assistance   Additional items Assist x 1; Increased time required;Verbal cues;LE management   Sit to Supine 3  Moderate assistance   Additional items Assist x 1; Increased time required;Verbal cues;LE management   Additional Comments pt able to sit on EOBx5 min to perform ther ex   Balance   Static Sitting Fair   Dynamic Sitting Fair -   Endurance Deficit   Endurance Deficit Yes   Endurance Deficit Description fatigue with min exertion   Activity Tolerance   Activity Tolerance Patient limited by fatigue   Exercises   Quad Sets Supine;20 reps;AROM; Bilateral   Heelslides Supine;20 reps;AROM; Bilateral   Glute Sets Supine;20 reps;AROM; Bilateral   Hip Flexion Sitting;20 reps;AROM; Bilateral   Hip Abduction Supine;20 reps;AROM; Bilateral   Hip Adduction Sitting;20 reps;AROM; Bilateral   Knee AROM Short Arc Quad Supine;20 reps;AROM; Bilateral   Ankle Pumps Supine;20 reps;Bilateral   Marching Sitting;20 reps;AROM; Bilateral   Assessment   Prognosis Fair   Problem List Decreased strength;Decreased endurance; Impaired balance;Decreased mobility; Decreased coordination; Impaired judgement;Decreased safety awareness   Assessment Pt seen for PT treatment session this date with interventions consisting of Therapeutic exercise consisting of: AROM 20 reps B LE in sitting and supine position and therapeutic activity consisting of training: bed mobility, supine<>sit transfers and static sitting tolerance at EOB for 5 minutes w/ mod UE support  Pt agreeable to PT treatment session upon arrival, pt found supine in bed w/ HOB elevated, in no apparent distress  In comparison to previous session, pt with improvements in ability to to perform supine to sit transfers  Post session: pt returned BTB and all needs in [facility] Continue to recommend return to the [facility] at time of d/c in order to maximize pt's functional independence and safety w/ mobility  Pt continues to be functioning below baseline level, and remains limited 2* factors listed above and including decreased endurance, strength and activity tolerance  PT will continue to see pt while here in order to address the deficits listed above and provide interventions consistent w/ POC in effort to achieve STGs  Goals   Patient Goals feel better   Treatment Day 2   Plan   Treatment/Interventions Functional transfer training;LE strengthening/ROM; Therapeutic exercise; Endurance training;Bed mobility;Gait training   Progress Slow progress, medical status limitations   PT Frequency 2-3x/wk   Recommendation   Recommendation Long-term skilled PT   Equipment Recommended Walker   PT - OK to Discharge No   Additional Comments upon conclusion, pt was resting in bed with all needs in [facility]   [address], [place]

## 2019-07-26 NOTE — ASSESSMENT & PLAN NOTE
Wt Readings from Last 3 Encounters:   07/26/19 64 9 kg (143 lb 1 3 oz)   08/27/18 60 3 kg (133 lb)   08/25/18 65 8 kg (145 lb)     · Unknown type  · Small bilateral pleural effusions and pulmonary vascular congestion   · Follow-up ECHO read  · Daily weight   · Monitor IVF/volume status closely   · Home Lasix resumed

## 2019-07-26 NOTE — PLAN OF CARE
Problem: Potential for Falls  Goal: Patient will remain free of falls  Description  INTERVENTIONS:  - Assess patient frequently for physical needs  -  Identify cognitive and physical deficits and behaviors that affect risk of falls  -  [place] fall precautions as indicated by assessment   - Educate patient/family on patient safety including physical limitations  - Instruct patient to call for assistance with activity based on assessment  - Modify environment to reduce risk of injury  - Consider OT/PT consult to assist with strengthening/mobility  Outcome: Progressing     Problem: Prexisting or High Potential for Compromised Skin Integrity  Goal: Skin integrity is maintained or improved  Description  INTERVENTIONS:  - Identify patients at risk for skin breakdown  - Assess and monitor skin integrity  - Assess and monitor nutrition and hydration status  - Monitor labs (i e  albumin)  - Assess for incontinence   - Turn and reposition patient  - Assist with mobility/ambulation  - Relieve pressure over bony prominences  - Avoid friction and shearing  - Provide appropriate hygiene as needed including keeping skin clean and dry  - Evaluate need for skin moisturizer/barrier cream  - Collaborate with interdisciplinary team (i e  Nutrition, Rehabilitation, etc )   - Patient/family teaching  Outcome: Progressing     Problem: Nutrition/Hydration-ADULT  Goal: Nutrient/Hydration intake appropriate for improving, restoring or maintaining nutritional needs  Description  Monitor and assess patient's nutrition/hydration status for malnutrition (ex- brittle hair, bruises, dry skin, pale skin and conjunctiva, muscle wasting, smooth red tongue, and disorientation)  Collaborate with interdisciplinary team and initiate plan and interventions as ordered  Monitor patient's weight and dietary intake as ordered or per policy  Utilize nutrition screening tool and intervene per policy   Determine patient's food preferences and provide high-protein, high-caloric foods as appropriate       INTERVENTIONS:  - Monitor oral intake, urinary output, labs, and treatment plans  - Assess nutrition and hydration status and recommend course of action  - Evaluate amount of meals eaten  - Assist patient with eating if necessary   - Allow adequate time for meals  - Recommend/ encourage appropriate diets, oral nutritional supplements, and vitamin/mineral supplements  - Order, calculate, and assess calorie counts as needed  - Recommend, monitor, and adjust tube feedings and TPN/PPN based on assessed needs  - Assess need for intravenous fluids  - Provide specific nutrition/hydration education as appropriate  - Include patient/family/caregiver in decisions related to nutrition  Outcome: Progressing     Problem: PAIN - ADULT  Goal: Verbalizes/displays adequate comfort level or baseline comfort level  Description  Interventions:  - Encourage patient to monitor pain and request assistance  - Assess pain using appropriate pain scale  - Administer analgesics based on type and severity of pain and evaluate response  - Implement non-pharmacological measures as appropriate and evaluate response  - Consider cultural and social influences on pain and pain management  - Notify physician/advanced practitioner if interventions unsuccessful or patient reports new pain  Outcome: Progressing     Problem: INFECTION - ADULT  Goal: Absence or prevention of progression during hospitalization  Description  INTERVENTIONS:  - Assess and monitor for signs and symptoms of infection  - Monitor lab/diagnostic results  - Monitor all insertion sites, i e  indwelling lines, tubes, and drains  - Administer medications as ordered  - Instruct and encourage patient and family to use good hand hygiene technique  - Identify and instruct in appropriate isolation precautions for identified infection/condition   Outcome: Progressing  Goal: Absence of fever/infection during neutropenic period  Description  INTERVENTIONS:  - Monitor WBC     Outcome: Progressing     Problem: SAFETY ADULT  Goal: Patient will remain free of falls  Description  INTERVENTIONS:  - Assess patient frequently for physical needs  -  Identify cognitive and physical deficits and behaviors that affect risk of falls    -  [place] fall precautions as indicated by assessment   - Educate patient/family on patient safety including physical limitations  - Instruct patient to call for assistance with activity based on assessment  - Modify environment to reduce risk of injury  - Consider OT/PT consult to assist with strengthening/mobility  Outcome: Progressing  Goal: Maintain or return to baseline ADL function  Description  INTERVENTIONS:  -  Assess patient's ability to carry out ADLs; assess patient's baseline for ADL function and identify physical deficits which impact ability to perform ADLs (bathing, care of mouth/teeth, toileting, grooming, dressing, etc )  - Assess/evaluate cause of self-care deficits   - Assess range of motion  - Assess patient's mobility; develop plan if impaired  - Assess patient's need for assistive devices and provide as appropriate  - Encourage maximum independence but intervene and supervise when necessary  ¯ Involve family in performance of ADLs  ¯ Assess for home care needs following discharge   ¯ Request OT consult to assist with ADL evaluation and planning for discharge  ¯ Provide patient education as appropriate  Outcome: Progressing  Goal: Maintain or return mobility status to optimal level  Description  INTERVENTIONS:  - Assess patient's baseline mobility status (ambulation, transfers, stairs, etc )    - Identify cognitive and physical deficits and behaviors that affect mobility  - Identify mobility aids required to assist with transfers and/or ambulation (gait belt, sit-to-stand, lift, walker, cane, etc )  - [place] fall precautions as indicated by assessment  - Record patient progress and toleration of activity level on Mobility SBAR; progress patient to next Phase/Stage  - Instruct patient to call for assistance with activity based on assessment  - Request Rehabilitation consult to assist with strengthening/weightbearing, etc   Outcome: Progressing     Problem: DISCHARGE PLANNING  Goal: Discharge to home or other facility with appropriate resources  Description  INTERVENTIONS:  - Identify barriers to discharge w/patient and caregiver  - Arrange for needed discharge resources and transportation as appropriate  - Identify discharge learning needs (meds, wound care, etc )  - Arrange for interpretive services to assist at discharge as needed  - Refer to Case Management Department for coordinating discharge planning if the patient needs post-hospital services based on physician/advanced practitioner order or complex needs related to functional status, cognitive ability, or social support system  Outcome: Progressing     Problem: Knowledge Deficit  Goal: Patient/family/caregiver demonstrates understanding of disease process, treatment plan, medications, and discharge instructions  Description  Complete learning assessment and assess knowledge base    Interventions:  - Provide teaching at level of understanding  - Provide teaching via preferred learning methods  Outcome: Progressing     Problem: GASTROINTESTINAL - ADULT  Goal: Minimal or absence of nausea and/or vomiting  Description  INTERVENTIONS:  - Administer IV fluids as ordered to ensure adequate hydration  - Maintain NPO status until nausea and vomiting are resolved  - Nasogastric tube as ordered  - Administer ordered antiemetic medications as needed  - Provide nonpharmacologic comfort measures as appropriate  - Advance diet as tolerated, if ordered  - Nutrition services referral to assist patient with adequate nutrition and appropriate food choices  Outcome: Progressing  Goal: Maintains adequate nutritional intake  Description  INTERVENTIONS:  - Monitor percentage of each meal consumed  - Identify factors contributing to decreased intake, treat as appropriate  - Assist with meals as needed  - Monitor I&O, WT and lab values  - Obtain nutrition services referral as needed  Outcome: Progressing     Problem: METABOLIC, FLUID AND ELECTROLYTES - ADULT  Goal: Electrolytes maintained within normal limits  Description  INTERVENTIONS:  - Monitor labs and assess patient for signs and symptoms of electrolyte imbalances  - Administer electrolyte replacement as ordered  - Monitor response to electrolyte replacements, including repeat lab results as appropriate  - Instruct patient on fluid and nutrition as appropriate  Outcome: Progressing  Goal: Fluid balance maintained  Description  INTERVENTIONS:  - Monitor labs and assess for signs and symptoms of volume excess or deficit  - Monitor I/O and WT  - Instruct patient on fluid and nutrition as appropriate  Outcome: Progressing     Problem: SKIN/TISSUE INTEGRITY - ADULT  Goal: Skin integrity remains intact  Description  INTERVENTIONS  - Identify patients at risk for skin breakdown  - Assess and monitor skin integrity  - Assess and monitor nutrition and hydration status  - Monitor labs (i e  albumin)  - Assess for incontinence   - Turn and reposition patient  - Assist with mobility/ambulation  - Relieve pressure over bony prominences  - Avoid friction and shearing  - Provide appropriate hygiene as needed including keeping skin clean and dry  - Evaluate need for skin moisturizer/barrier cream  - Collaborate with interdisciplinary team (i e  Nutrition, Rehabilitation, etc )   - Patient/family teaching  Outcome: Progressing

## 2019-07-26 NOTE — ASSESSMENT & PLAN NOTE
· Appreciate surgical input  · Continue NG tube  · Keep the patient NPO  · Gentle IV fluid  · Persistent obstruction noted on previous obstruction series  · Defer to surgery regarding further recommendations

## 2019-07-26 NOTE — PHYSICIAN ADVISOR
Current patient class: Inpatient  The patient is currently on Hospital Day: 3 at [address]      The patient was admitted to the hospital at [address] [place] [address] on 7/24/19 for the following diagnosis:  Small bowel obstruction ([address] ) [K56 609]  Abdominal pain [R10 9]       There is documentation in the medical record of an expected length of stay of at least 2 midnights  The patient is therefore expected to satisfy the 2 midnight benchmark and given the 2 midnight presumption is appropriate for INPATIENT ADMISSION  Given this expectation of a satisfying stay, CMS instructs us that the patient is most often appropriate for inpatient admission under part A provided medical necessity is documented in the chart  After review of the relevant documentation, labs, vital signs and test results, the patient is appropriate for INPATIENT ADMISSION  Admission to the hospital as an inpatient is a complex decision making process which requires the practitioner to consider the patients presenting complaint, history and physical examination and all relevant testing  With this in mind, in this case, the patient was deemed appropriate for INPATIENT ADMISSION  After review of the documentation and testing available at the time of the admission I concur with this clinical determination of medical necessity  Rationale is as follows: The patient is a 80 yrs old Female who presented to the ED at 7/24/2019  6:03 PM with a chief complaint of nausea/vomiting and abdominal distension    Cognitive impairment  Assessment & Plan  · With forgetfulness   · Will continue supportive care and monitor closely      Leukemoid reaction  Assessment & Plan  · Likely due to SBO   · Patient is afebrile   · Now resolved     Congestive heart failure (CHF) (HCC)  Assessment & Plan      Wt Readings from Last 3 Encounters:   07/25/19 66 1 kg (145 lb 12 8 oz)   08/27/18 60 3 kg (133 lb)   08/25/18 65 8 kg (145 lb)      · Unknown type  · Small bilateral pleural effusions and pulmonary vascular congestion   · Will check ECHO   · Daily weight   · Monitor IVF/volume status closely            Mild protein-calorie malnutrition (HCC)  Assessment & Plan  Malnutrition Findings:            BMI Findings: Body mass index is 22 84 kg/m²     Nutrition consult         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, stage 2  Assessment & Plan  · POA   · Continue with local wound care   · Frequent turn and reposition      Hypothyroidism due to acquired atrophy of thyroid  Assessment & Plan  · Will hold levothyroxine while NPO     CKD (chronic kidney disease) stage 3, GFR 30-59 ml/min ([facility])  Assessment & Plan  · Baseline creatinine appears to be between 1 2-1 4 mg/dL   · Currently at baseline   · Will continue to monitor renal function closely   · Hold furosemide while NPO   · Avoid nephrotoxins     Essential hypertension  Assessment & Plan  · Will hold PO medication for now  · Add PRN hydralazine      * Small bowel obstruction (HCC)  Assessment & Plan  · Appreciate surgical input  · NG tube  · Keep the patient NPO  · Gentle IV fluid  · OBS series today with gastrograffin to determine need for laparotomy  · IV protonix      The patients vitals on arrival were ED Triage Vitals   Temperature Pulse Respirations Blood Pressure SpO2   07/24/19 2115 07/24/19 1816 07/24/19 1816 07/24/19 1816 07/24/19 1816   97 8 °F (36 6 °C) 92 16 140/72 92 %      Temp Source Heart Rate Source Patient Position - Orthostatic VS BP Location FiO2 (%)   07/24/19 2115 07/24/19 2039 07/24/19 2115 07/24/19 2115 --   Tympanic Monitor Lying Left arm       Pain Score       07/24/19 2241       No Pain           Past Medical History:   Diagnosis Date    Arthritis     CHF (congestive heart failure) (HCC)     CKD (chronic kidney disease) stage 3, GFR 30-59 ml/min ([facility]) 8/25/2018    Disease of thyroid gland     Hypertension     MI, old     Pressure injury of skin     TIA (transient ischemic attack)      Past Surgical History:   Procedure Laterality Date    ANKLE FRACTURE SURGERY      APPENDECTOMY      CAROTID ARTERY - SUBCLAVIAN ARTERY BYPASS GRAFT      HYSTERECTOMY      TONSILLECTOMY             Consults have been placed to:   IP CONSULT TO ACUTE CARE SURGERY  IP CONSULT TO CASE MANAGEMENT    Vitals:    07/25/19 1540 07/25/19 2214 07/25/19 2345 07/26/19 0600   BP: 125/59 155/67 126/53    BP Location: Right arm Right arm Right arm    Pulse: 86 82 70    Resp: 17 18 18    Temp: 98 3 °F (36 8 °C) 97 7 °F (36 5 °C) 97 6 °F (36 4 °C)    TempSrc: Temporal Temporal Temporal    SpO2: 100% 99% 96%    Weight:    64 9 kg (143 lb 1 3 oz)   Height:           Most recent labs:    Recent Labs     07/24/19  1838  07/25/19  0028 07/25/19  0526 07/26/19  0532   WBC 13 90*  --   --  9 00 9 30   HGB 12 3  --   --  11 0* 11 1*   HCT 37 2*  --   --  32 8* 33 3*     --   --  332 345   K 4 1  --   --  3 7  --    CALCIUM 8 9  --   --  7 9*  --    BUN 40*  --   --  35*  --    CREATININE 1 48*  --   --  1 29*  --    LIPASE 16  --   --   --   --    INR 1 06  --   --   --   --    TROPONINI 0 03   < > 0 04*  --   --    AST 27  --   --  23  --    ALT 55*  --   --  40  --    ALKPHOS 73  --   --  68  --     < > = values in this interval not displayed         Scheduled Meds:  Current Facility-Administered Medications:  furosemide 20 mg Intravenous Daily Matias Gar MD    heparin (porcine) 5,000 Units Subcutaneous [facility] PAOLO Ortega    hydrALAZINE 10 mg Intravenous Q6H PRN PAOLO Ortega    ondansetron 4 mg Intravenous Q6H PRN PAOLO Ortega    pantoprazole 40 mg Intravenous Q24H [facility] PAOLO Ortega    phenol 1 spray Mouth/Throat Q2H PRN Nicolasa Stoner MD    sodium chloride 60 mL/hr Intravenous Continuous Matias Gar MD Last Rate: 60 mL/hr (07/25/19 6094)     Continuous Infusions:  sodium chloride 60 mL/hr Last Rate: 60 mL/hr (07/25/19 1354)     PRN Meds: hydrALAZINE    ondansetron    phenol    Surgical procedures (if appropriate):

## 2019-07-26 NOTE — PLAN OF CARE
Problem: PHYSICAL THERAPY ADULT  Goal: Performs mobility at highest level of function for planned discharge setting  See evaluation for individualized goals  Description  Treatment/Interventions: Functional transfer training, LE strengthening/ROM, Therapeutic exercise, Endurance training, Patient/family training, Equipment eval/education, Bed mobility, Gait training, OT(&neuro re-education w/balance training)     See flowsheet documentation for full assessment, interventions and recommendations  Margarette Palacio PT     Outcome: Progressing  Note:   Prognosis: Fair  Problem List: Decreased strength, Decreased endurance, Impaired balance, Decreased mobility, Decreased coordination, Impaired judgement, Decreased safety awareness  Assessment: Pt seen for PT treatment session this date with interventions consisting of Therapeutic exercise consisting of: AROM 20 reps B LE in sitting and supine position and therapeutic activity consisting of training: bed mobility, supine<>sit transfers and static sitting tolerance at EOB for 5 minutes w/ mod UE support  Pt agreeable to PT treatment session upon arrival, pt found supine in bed w/ HOB elevated, in no apparent distress  In comparison to previous session, pt with improvements in ability to to perform supine to sit transfers  Post session: pt returned BTB and all needs in reach Continue to recommend return to the [facility] at time of d/c in order to maximize pt's functional independence and safety w/ mobility  Pt continues to be functioning below baseline level, and remains limited 2* factors listed above and including decreased endurance, strength and activity tolerance  PT will continue to see pt while here in order to address the deficits listed above and provide interventions consistent w/ POC in effort to achieve STGs  Recommendation: Long-term skilled PT     PT - [place] to Discharge: No  Asya Johnson, PTA    See flowsheet documentation for full assessment

## 2019-07-27 PROBLEM — R19.7 DIARRHEA: Status: ACTIVE | Noted: 2019-07-27

## 2019-07-27 PROBLEM — I50.42 CHRONIC COMBINED SYSTOLIC AND DIASTOLIC CONGESTIVE HEART FAILURE (HCC): Status: ACTIVE | Noted: 2019-07-24

## 2019-07-27 LAB
ANION GAP SERPL CALCULATED.3IONS-SCNC: 11 MMOL/L (ref 4–13)
BUN SERPL-MCNC: 30 MG/DL (ref 7–25)
CALCIUM SERPL-MCNC: 8.4 MG/DL (ref 8.6–10.5)
CHLORIDE SERPL-SCNC: 107 MMOL/L (ref 98–107)
CO2 SERPL-SCNC: 26 MMOL/L (ref 21–31)
CREAT SERPL-MCNC: 1.16 MG/DL (ref 0.6–1.2)
ERYTHROCYTE [DISTWIDTH] IN BLOOD BY AUTOMATED COUNT: 15 % (ref 11.5–14.5)
GFR SERPL CREATININE-BSD FRML MDRD: 42 ML/MIN/1.73SQ M
GLUCOSE SERPL-MCNC: 145 MG/DL (ref 65–99)
HCT VFR BLD AUTO: 32.8 % (ref 42–47)
HGB BLD-MCNC: 11.1 G/DL (ref 12–16)
MCH RBC QN AUTO: 30 PG (ref 26–34)
MCHC RBC AUTO-ENTMCNC: 33.9 G/DL (ref 31–37)
MCV RBC AUTO: 89 FL (ref 81–99)
PLATELET # BLD AUTO: 358 THOUSANDS/UL (ref 149–390)
PMV BLD AUTO: 9.4 FL (ref 8.6–11.7)
POTASSIUM SERPL-SCNC: 2.8 MMOL/L (ref 3.5–5.5)
RBC # BLD AUTO: 3.7 MILLION/UL (ref 3.9–5.2)
SODIUM SERPL-SCNC: 144 MMOL/L (ref 134–143)
WBC # BLD AUTO: 8.9 THOUSAND/UL (ref 4.8–10.8)

## 2019-07-27 PROCEDURE — C9113 INJ PANTOPRAZOLE SODIUM, VIA: HCPCS | Performed by: NURSE PRACTITIONER

## 2019-07-27 PROCEDURE — 99232 SBSQ HOSP IP/OBS MODERATE 35: CPT | Performed by: SURGERY

## 2019-07-27 PROCEDURE — 99232 SBSQ HOSP IP/OBS MODERATE 35: CPT | Performed by: NURSE PRACTITIONER

## 2019-07-27 PROCEDURE — 85027 COMPLETE CBC AUTOMATED: CPT | Performed by: INTERNAL MEDICINE

## 2019-07-27 PROCEDURE — 80048 BASIC METABOLIC PNL TOTAL CA: CPT | Performed by: INTERNAL MEDICINE

## 2019-07-27 RX ORDER — POTASSIUM CHLORIDE 14.9 MG/ML
20 INJECTION INTRAVENOUS ONCE
Status: COMPLETED | OUTPATIENT
Start: 2019-07-27 | End: 2019-07-27

## 2019-07-27 RX ORDER — POTASSIUM CHLORIDE 20 MEQ/1
40 TABLET, EXTENDED RELEASE ORAL EVERY 4 HOURS
Status: DISCONTINUED | OUTPATIENT
Start: 2019-07-27 | End: 2019-07-27

## 2019-07-27 RX ADMIN — DEXTROSE AND SODIUM CHLORIDE 75 ML/HR: 5; 450 INJECTION, SOLUTION INTRAVENOUS at 01:40

## 2019-07-27 RX ADMIN — IOHEXOL 250 ML: 240 INJECTION, SOLUTION INTRATHECAL; INTRAVASCULAR; INTRAVENOUS; ORAL at 10:49

## 2019-07-27 RX ADMIN — FUROSEMIDE 20 MG: 10 INJECTION, SOLUTION INTRAMUSCULAR; INTRAVENOUS at 11:59

## 2019-07-27 RX ADMIN — POTASSIUM CHLORIDE 20 MEQ: 200 INJECTION, SOLUTION INTRAVENOUS at 14:31

## 2019-07-27 RX ADMIN — POTASSIUM CHLORIDE 20 MEQ: 200 INJECTION, SOLUTION INTRAVENOUS at 12:00

## 2019-07-27 RX ADMIN — PANTOPRAZOLE SODIUM 40 MG: 40 INJECTION, POWDER, LYOPHILIZED, FOR SOLUTION INTRAVENOUS at 11:59

## 2019-07-27 RX ADMIN — POTASSIUM CHLORIDE 20 MEQ: 200 INJECTION, SOLUTION INTRAVENOUS at 16:50

## 2019-07-27 RX ADMIN — HEPARIN SODIUM 5000 UNITS: 5000 INJECTION, SOLUTION INTRAVENOUS; SUBCUTANEOUS at 14:54

## 2019-07-27 RX ADMIN — HEPARIN SODIUM 5000 UNITS: 5000 INJECTION, SOLUTION INTRAVENOUS; SUBCUTANEOUS at 05:19

## 2019-07-27 RX ADMIN — DEXTROSE AND SODIUM CHLORIDE 75 ML/HR: 5; 450 INJECTION, SOLUTION INTRAVENOUS at 14:34

## 2019-07-27 RX ADMIN — HEPARIN SODIUM 5000 UNITS: 5000 INJECTION, SOLUTION INTRAVENOUS; SUBCUTANEOUS at 21:28

## 2019-07-27 NOTE — ASSESSMENT & PLAN NOTE
· Appreciate surgical input  · Continue NG tube  · Keep the patient NPO  · Gentle IV fluid  · Persistent obstruction noted on previous obstruction series  · Pending modified gastrografin small bowel series 7/26- final reading is pending   · Defer to surgery regarding further recommendations  · If contrast reaches the colon, surgery recommended to remove NGT and start clears

## 2019-07-27 NOTE — PROGRESS NOTES
Progress Note - Danni Parker 5/24/1931, 80 y o  female MRN: [id number]    Unit/Bed#: -01 Encounter: [id number]    Primary Care Provider: Zafar Scott DO   Date and time admitted to hospital: 7/24/2019  6:03 PM        * Small bowel obstruction [facility])  Assessment & Plan  · Appreciate surgical input  · Continue NG tube  · Keep the patient NPO  · Gentle IV fluid  · Persistent obstruction noted on previous obstruction series  · Pending modified gastrografin small bowel series 7/26- final reading is pending   · Defer to surgery regarding further recommendations  · If contrast reaches the colon, surgery recommended to remove NGT and start clears    Chronic combined systolic and diastolic congestive heart failure ([facility] [address] )  Assessment & Plan  Wt Readings from Last 3 Encounters:   07/27/19 64 7 kg (142 lb 10 2 oz)   08/27/18 60 3 kg (133 lb)   08/25/18 65 8 kg (145 lb)     · Unknown type  · Small bilateral pleural effusions and pulmonary vascular congestion   · ECHO- LVEF 50%  Grade 1 diastolic dysfunction  · Daily weight   · Monitor IVF/volume status closely   · Home Lasix resumed        Leukemoid reaction  Assessment & Plan  · Likely due to SBO   · Patient is afebrile   · Now resolved    Diarrhea  Assessment & Plan  · Likely due to ileus   · Afebrile and no leukocytosis  · Will monitor     Cognitive impairment  Assessment & Plan  · With forgetfulness   · Will continue supportive care and monitor closely     Mild protein-calorie malnutrition (HCC)  Assessment & Plan  Malnutrition Findings:           BMI Findings: Body mass index is 22 34 kg/m²     Nutrition consult       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, stage 2  Assessment & Plan  · POA   · Continue with local wound care   · Frequent turn and reposition     Hypothyroidism due to acquired atrophy of thyroid  Assessment & Plan  · Will hold levothyroxine while NPO    CKD (chronic kidney disease) stage 3, GFR 30-59 ml/min ([facility])  Assessment & Plan  · Baseline creatinine appears to be between 1 2-1 4 mg/dL   · Currently at baseline   · Will continue to monitor renal function closely   · Lasix is resumed- currently IV due to NPO for SBO  · Avoid nephrotoxins    Essential hypertension  Assessment & Plan  · Will hold PO medication for now  · Add PRN hydralazine         VTE Pharmacologic Prophylaxis: Pharmacologic: Heparin    Patient Centered Rounds: I have performed bedside rounds with nursing staff today  Discussions with Specialists or Other Care Team Provider: nursing, CM  Education and Discussions with Family / Patient: patient and family     Current Length of Stay: 3 day(s)    Current Patient Status: Inpatient   Certification Statement: The patient will continue to require additional inpatient hospital stay due to SBO    Discharge Plan: pending hospital course  Code Status: Level 1 - Full Code    Subjective:   Feeling well  Denies n/v  Staff reports that patient has been having diarrhea  Denies abdominal pain  Objective:     Vitals:   Temp (24hrs), Av 9 °F (36 6 °C), Min:97 °F (36 1 °C), Max:98 7 °F (37 1 °C)    Temp:  [97 °F (36 1 °C)-98 7 °F (37 1 °C)] 98 7 °F (37 1 °C)  HR:  [55-88] 55  Resp:  [18] 18  BP: (112-136)/(64-85) 136/80  SpO2:  [96 %-100 %] 100 %  Body mass index is 22 34 kg/m²  Input and Output Summary (last 24 hours): Intake/Output Summary (Last 24 hours) at 2019 0931  Last data filed at 2019 9098  Gross per 24 hour   Intake 40 ml   Output 820 ml   Net -780 ml       Physical Exam:     Physical Exam   Constitutional: She is oriented to person, place, and time  She appears well-developed  No distress  Frail appearing elderly female      HENT:   Head: Normocephalic and atraumatic  Mouth/Throat: Oropharynx is clear and moist    Eyes: Pupils are equal, round, and reactive to light  Conjunctivae and EOM are normal    Neck: Normal range of motion  Neck supple     Cardiovascular: Normal rate, regular rhythm and normal heart sounds  Exam reveals no gallop and no friction rub  No murmur heard  Pulmonary/Chest: Effort normal and breath sounds normal  She has no wheezes  She has no rales  Abdominal: Soft  She exhibits no distension  There is no tenderness  There is no rebound  Somewhat hypoactive BS  NGT in place     Musculoskeletal: Normal range of motion  She exhibits no edema, tenderness or deformity  Neurological: She is alert and oriented to person, place, and time  No cranial nerve deficit  With period of forgetfulness     Skin: Skin is warm and dry  No rash noted  No erythema  Psychiatric: She has a normal mood and affect  Her behavior is normal    Vitals reviewed  Additional Data:     Labs:    Results from last 7 days   Lab Units 07/27/19  0507  07/25/19  0526   WBC Thousand/uL 8 90   < > 9 00   HEMOGLOBIN g/dL 11 1*   < > 11 0*   HEMATOCRIT % 32 8*   < > 32 8*   PLATELETS Thousands/uL 358   < > 332   NEUTROS PCT %  --   --  77*   LYMPHS PCT %  --   --  12*   MONOS PCT %  --   --  10   EOS PCT %  --   --  1    < > = values in this interval not displayed  Results from last 7 days   Lab Units 07/27/19  0507  07/25/19  0526   POTASSIUM mmol/L 2 8*   < > 3 7   CHLORIDE mmol/L 107   < > 105   CO2 mmol/L 26   < > 27   BUN mg/dL 30*   < > 35*   CREATININE mg/dL 1 16   < > 1 29*   CALCIUM mg/dL 8 4*   < > 7 9*   ALK PHOS U/L  --   --  68   ALT U/L  --   --  40   AST U/L  --   --  23    < > = values in this interval not displayed  Results from last 7 days   Lab Units 07/24/19  1838   INR  1 06               * I Have Reviewed All Lab Data Listed Above  * Additional Pertinent Lab Tests Reviewed:  [address] Admission  Reviewed    Imaging:  Imaging Reports Reviewed Today Include: small bowel series     Recent Cultures (last 7 days):     Results from last 7 days   Lab Units 07/25/19  0621 07/24/19  1952 07/24/19  0450 07/20/19  1424   BLOOD CULTURE   -- No Growth at 24 hrs  No Growth at 24 hrs   --   --    SPUTUM CULTURE   --   --  1+ Growth of   Commensal respiratory juanita only; No significant growth of Staph aureus/MRSA or Pseudomonas aeruginosa  --    GRAM STAIN RESULT   --   --  1+ Epithelial cells per low power field*  Rare Polys*  2+ Gram positive cocci in pairs, chains and clusters*  1+ Gram positive rods*  1+ Gram negative rods*  --    URINE CULTURE  20,000-29,000 cfu/ml   --   --  40,000-49,000 cfu/ml        Last 24 Hours Medication List:     Current Facility-Administered Medications:  dextrose 5 % and sodium chloride 0 45 % 75 mL/hr Intravenous Continuous Jimi Conway MD Last Rate: 75 mL/hr (07/27/19 0140)   furosemide 20 mg Intravenous Daily Jimi Conway MD    heparin (porcine) 5,000 Units Subcutaneous [facility] PAOLO Meadows    hydrALAZINE 10 mg Intravenous Q6H PRN PAOLO Meadows    ondansetron 4 mg Intravenous Q6H PRN PAOLO Meadows    pantoprazole 40 mg Intravenous Q24H [address] PAOLO Meadows    phenol 1 spray Mouth/Throat Q2H PRN Bharti Gamboa MD    potassium chloride 40 mEq Oral Q4H PAOLO Meadows         Today, Patient Was Seen By: PAOLO Meadows    ** Please Note: Dictation voice to text software may have been used in the creation of this document   **

## 2019-07-27 NOTE — ASSESSMENT & PLAN NOTE
Wt Readings from Last 3 Encounters:   07/27/19 64 7 kg (142 lb 10 2 oz)   08/27/18 60 3 kg (133 lb)   08/25/18 65 8 kg (145 lb)     · Unknown type  · Small bilateral pleural effusions and pulmonary vascular congestion   · ECHO- LVEF 50%  Grade 1 diastolic dysfunction    · Daily weight   · Monitor IVF/volume status closely   · Home Lasix resumed

## 2019-07-27 NOTE — NURSING NOTE
Pt alert and orientated, pleasant and cooperative  Heart is irregular, trace edema noted to bilat lower legs  Lungs are decreased, a few faint crackles in bilat bases, on 3L which pt states is not chronic  Denies pain at this time  NG tube in place Right nare, green/brown drainage  Pt states she does feel better since ng  Hourly rounding discussed  Call bell within reach

## 2019-07-27 NOTE — ASSESSMENT & PLAN NOTE
· Baseline creatinine appears to be between 1 2-1 4 mg/dL   · Currently at baseline   · Will continue to monitor renal function closely   · Lasix is resumed- currently IV due to NPO for SBO  · Avoid nephrotoxins

## 2019-07-27 NOTE — NURSING NOTE
Pt has been A+Ox4, denies chest pain and SOB  Incontinent of loose stool throughout the shift, hospitalist aware, maintained on 2L oxygen  NG tube removed per order, tolerating clear liquids, no nausea, vomiting or abdominal pain

## 2019-07-27 NOTE — PROGRESS NOTES
Progress Note - General Surgery   Uche Cross 80 y o  female MRN: [id number]  Unit/Bed#: -01 Encounter: [id number]    Assessment:  Patient is a pleasant 51-year-old female hospital day 3 with a mechanical small-bowel obstruction  The patient reports that she is hungry  She reports passage of liquid stool per rectum  Nursing reports the patient is incontinent of liquid stool  She is afebrile with stable vital signs  Her abdomen is soft minimally tender to percussion and nontender to palpation  Review of the small-bowel series reveals what appears to be contrast within the colon and rectum  Plan:  Await the final report on the small-bowel follow-through  If it does confirm passage of contrast to the colon I would recommend discontinuation of the nasogastric tube and advancing the patient to a clear liquid diet  If she tolerates the clear liquids we can advance her diet as tolerated  If she has recurrent nausea vomiting all recommend exploratory laparotomy  The son was updated by phone  He is in agreement with the treatment plan as outlined above  Subjective/Objective   Chief Complaint:  Mechanical small-bowel obstruction    Subjective:  Patient reports feeling well    Objective:  Abdomen as above    Blood pressure 136/80, pulse 55, temperature 98 7 °F (37 1 °C), temperature source Tympanic, resp  rate 18, height 5' 7" (1 702 m), weight 64 7 kg (142 lb 10 2 oz), SpO2 100 %  ,Body mass index is 22 34 kg/m²        Intake/Output Summary (Last 24 hours) at 7/27/2019 0945  Last data filed at 7/27/2019 0527  Gross per 24 hour   Intake 40 ml   Output 820 ml   Net -780 ml       Invasive Devices     Peripheral Intravenous Line            Peripheral IV 07/24/19 Left Antecubital 2 days          Drain            NG/OG/Enteral Tube Nasogastric Right nares 3 days

## 2019-07-28 ENCOUNTER — APPOINTMENT (INPATIENT)
Dept: RADIOLOGY | Facility: HOSPITAL | Age: 84
DRG: 389 | End: 2019-07-28
Payer: MEDICARE

## 2019-07-28 PROBLEM — I48.91 ATRIAL FIBRILLATION WITH RAPID VENTRICULAR RESPONSE (HCC): Status: ACTIVE | Noted: 2019-07-28

## 2019-07-28 LAB
ANION GAP SERPL CALCULATED.3IONS-SCNC: 6 MMOL/L (ref 4–13)
ATRIAL RATE: 144 BPM
BUN SERPL-MCNC: 28 MG/DL (ref 7–25)
CALCIUM SERPL-MCNC: 8.1 MG/DL (ref 8.6–10.5)
CHLORIDE SERPL-SCNC: 106 MMOL/L (ref 98–107)
CO2 SERPL-SCNC: 26 MMOL/L (ref 21–31)
CREAT SERPL-MCNC: 1.31 MG/DL (ref 0.6–1.2)
ERYTHROCYTE [DISTWIDTH] IN BLOOD BY AUTOMATED COUNT: 15.1 % (ref 11.5–14.5)
GFR SERPL CREATININE-BSD FRML MDRD: 36 ML/MIN/1.73SQ M
GLUCOSE SERPL-MCNC: 167 MG/DL (ref 65–99)
HCT VFR BLD AUTO: 30.2 % (ref 42–47)
HGB BLD-MCNC: 10.2 G/DL (ref 12–16)
MAGNESIUM SERPL-MCNC: 2.1 MG/DL (ref 1.9–2.7)
MCH RBC QN AUTO: 29.4 PG (ref 26–34)
MCHC RBC AUTO-ENTMCNC: 33.6 G/DL (ref 31–37)
MCV RBC AUTO: 87 FL (ref 81–99)
PLATELET # BLD AUTO: 314 THOUSANDS/UL (ref 149–390)
PMV BLD AUTO: 8.4 FL (ref 8.6–11.7)
POTASSIUM SERPL-SCNC: 3.4 MMOL/L (ref 3.5–5.5)
QRS AXIS: 12 DEGREES
QRSD INTERVAL: 116 MS
QT INTERVAL: 334 MS
QTC INTERVAL: 474 MS
RBC # BLD AUTO: 3.46 MILLION/UL (ref 3.9–5.2)
SODIUM SERPL-SCNC: 138 MMOL/L (ref 134–143)
T WAVE AXIS: 175 DEGREES
VENTRICULAR RATE: 121 BPM
WBC # BLD AUTO: 9.8 THOUSAND/UL (ref 4.8–10.8)

## 2019-07-28 PROCEDURE — 71045 X-RAY EXAM CHEST 1 VIEW: CPT

## 2019-07-28 PROCEDURE — 83735 ASSAY OF MAGNESIUM: CPT | Performed by: NURSE PRACTITIONER

## 2019-07-28 PROCEDURE — 93010 ELECTROCARDIOGRAM REPORT: CPT | Performed by: INTERNAL MEDICINE

## 2019-07-28 PROCEDURE — C9113 INJ PANTOPRAZOLE SODIUM, VIA: HCPCS | Performed by: NURSE PRACTITIONER

## 2019-07-28 PROCEDURE — 85027 COMPLETE CBC AUTOMATED: CPT | Performed by: NURSE PRACTITIONER

## 2019-07-28 PROCEDURE — 80048 BASIC METABOLIC PNL TOTAL CA: CPT | Performed by: NURSE PRACTITIONER

## 2019-07-28 PROCEDURE — 99232 SBSQ HOSP IP/OBS MODERATE 35: CPT | Performed by: SURGERY

## 2019-07-28 PROCEDURE — 93005 ELECTROCARDIOGRAM TRACING: CPT

## 2019-07-28 PROCEDURE — 99221 1ST HOSP IP/OBS SF/LOW 40: CPT | Performed by: INTERNAL MEDICINE

## 2019-07-28 PROCEDURE — 99232 SBSQ HOSP IP/OBS MODERATE 35: CPT | Performed by: NURSE PRACTITIONER

## 2019-07-28 RX ORDER — SODIUM CHLORIDE 9 MG/ML
60 INJECTION, SOLUTION INTRAVENOUS CONTINUOUS
Status: DISCONTINUED | OUTPATIENT
Start: 2019-07-28 | End: 2019-07-30 | Stop reason: HOSPADM

## 2019-07-28 RX ORDER — POTASSIUM CHLORIDE 14.9 MG/ML
20 INJECTION INTRAVENOUS ONCE
Status: COMPLETED | OUTPATIENT
Start: 2019-07-28 | End: 2019-07-28

## 2019-07-28 RX ORDER — METOPROLOL TARTRATE 5 MG/5ML
5 INJECTION INTRAVENOUS ONCE
Status: DISCONTINUED | OUTPATIENT
Start: 2019-07-28 | End: 2019-07-28

## 2019-07-28 RX ORDER — DEXTROSE AND SODIUM CHLORIDE 5; .45 G/100ML; G/100ML
60 INJECTION, SOLUTION INTRAVENOUS CONTINUOUS
Status: DISCONTINUED | OUTPATIENT
Start: 2019-07-28 | End: 2019-07-28

## 2019-07-28 RX ADMIN — POTASSIUM CHLORIDE 20 MEQ: 200 INJECTION, SOLUTION INTRAVENOUS at 11:58

## 2019-07-28 RX ADMIN — HEPARIN SODIUM 5000 UNITS: 5000 INJECTION, SOLUTION INTRAVENOUS; SUBCUTANEOUS at 21:50

## 2019-07-28 RX ADMIN — SODIUM CHLORIDE 250 ML: 9 INJECTION, SOLUTION INTRAVENOUS at 10:59

## 2019-07-28 RX ADMIN — METOPROLOL TARTRATE 25 MG: 25 TABLET, FILM COATED ORAL at 12:42

## 2019-07-28 RX ADMIN — HEPARIN SODIUM 5000 UNITS: 5000 INJECTION, SOLUTION INTRAVENOUS; SUBCUTANEOUS at 05:05

## 2019-07-28 RX ADMIN — SODIUM CHLORIDE 60 ML/HR: 9 INJECTION, SOLUTION INTRAVENOUS at 12:38

## 2019-07-28 RX ADMIN — PANTOPRAZOLE SODIUM 40 MG: 40 INJECTION, POWDER, LYOPHILIZED, FOR SOLUTION INTRAVENOUS at 09:38

## 2019-07-28 RX ADMIN — SODIUM CHLORIDE 250 ML: 9 INJECTION, SOLUTION INTRAVENOUS at 23:32

## 2019-07-28 RX ADMIN — POTASSIUM CHLORIDE 20 MEQ: 200 INJECTION, SOLUTION INTRAVENOUS at 09:37

## 2019-07-28 RX ADMIN — DEXTROSE AND SODIUM CHLORIDE 75 ML/HR: 5; 450 INJECTION, SOLUTION INTRAVENOUS at 05:12

## 2019-07-28 RX ADMIN — HEPARIN SODIUM 5000 UNITS: 5000 INJECTION, SOLUTION INTRAVENOUS; SUBCUTANEOUS at 15:16

## 2019-07-28 NOTE — NURSING NOTE
Pt has not had bm at all this shift, aides found yellowish/brown vomit on bedding during inc check  Pt did not know why she did not ring  Bedding changed, HOB up 45 degrees, call bell within reach

## 2019-07-28 NOTE — PROGRESS NOTES
Progress Note - Ling Colvin 5/24/1931, 80 y o  female MRN: [id number]    Unit/Bed#: -01 Encounter: [id number]    Primary Care Provider: Bart Chua DO   Date and time admitted to hospital: 7/24/2019  6:03 PM        * Small bowel obstruction [facility])  Assessment & Plan  · Appreciate surgical input  · NGT was discontinued 7/27 evening as small-bowel series shows contrast reached the colon  However, a high-grade partial small bowel obstruction persists  There is 1 episode of emesis overnight  · Clinically, patient denies any nausea, vomiting or abdominal pain this a m     Surgery would like to continue to monitor on clear liquid diet for now  If the patient is unable to tolerate diet and recurrent nausea and vomiting, she would likely need a laparotomy and lysis of adhesions  · Continue with gentle IV fluid  · Defer to surgery regarding further recommendations      Chronic combined systolic and diastolic congestive heart failure [facility])  Assessment & Plan  Wt Readings from Last 3 Encounters:   07/28/19 65 5 kg (144 lb 6 4 oz)   08/27/18 60 3 kg (133 lb)   08/25/18 65 8 kg (145 lb)     · Small bilateral pleural effusions and pulmonary vascular congestion   · ECHO- LVEF 50%  Grade 1 diastolic dysfunction  · Daily weight   · Monitor IVF/volume status closely   · Will hold lasix for today due to elevated creatinine and vomiting last night         Leukemoid reaction  Assessment & Plan  · Likely due to SBO   · Patient is afebrile   · Now resolved    Diarrhea  Assessment & Plan  · Likely due to ileus   · Afebrile and no leukocytosis  · Will monitor     Cognitive impairment  Assessment & Plan  · With forgetfulness   · Will continue supportive care and monitor closely     Mild protein-calorie malnutrition ([address] )  Assessment & Plan  Malnutrition Findings:           BMI Findings: Body mass index is 22 62 kg/m²     Nutrition consult       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, stage 2  Assessment & Plan  · POA   · Continue with local wound care   · Frequent turn and reposition     Hypothyroidism due to acquired atrophy of thyroid  Assessment & Plan  · Resume levothyroxine    CKD (chronic kidney disease) stage 3, GFR 30-59 ml/min ([facility])  Assessment & Plan  · Baseline creatinine appears to be between 1 2-1 4 mg/dL   · Slight bumped in Cr 1 16--> 1 31  · Hold lasix today   · Check CXR   · Will continue to monitor renal function closely   · Avoid nephrotoxins    Essential hypertension  Assessment & Plan  · Will hold PO medication for now  · BP has been well-controlled   · Add PRN hydralazine         VTE Pharmacologic Prophylaxis: Pharmacologic: Heparin    Patient Centered Rounds: I have performed bedside rounds with nursing staff today  Discussions with Specialists or Other Care Team Provider: nursing, surgery   Education and Discussions with Family / Patient: patient and son     Current Length of Stay: 4 day(s)    Current Patient Status: Inpatient   Certification Statement: The patient will continue to require additional inpatient hospital stay due to SBO    Discharge Plan: pending hospital course     Code Status: Level 1 - Full Code    Subjective:   Feeling well  Denies any n/v/abdomia pain  Denies any chest pain or dyspnea  Per staff the patient had under episodes of bilious emesis last night  Objective:     Vitals:   Temp (24hrs), Av °F (37 2 °C), Min:97 6 °F (36 4 °C), Max:99 9 °F (37 7 °C)    Temp:  [97 6 °F (36 4 °C)-99 9 °F (37 7 °C)] 97 6 °F (36 4 °C)  HR:  [51-60] 52  Resp:  [16-18] 18  BP: ()/(55-62) 104/59  SpO2:  [93 %-96 %] 95 %  Body mass index is 22 62 kg/m²  Input and Output Summary (last 24 hours):        Intake/Output Summary (Last 24 hours) at 2019 8995  Last data filed at 2019 1756  Gross per 24 hour   Intake 320 ml   Output    Net 320 ml       Physical Exam:     Physical Exam   Constitutional: She is oriented to person, place, and time  She appears well-developed  No distress  Frail appearing elderly female     HENT:   Head: Normocephalic and atraumatic  Mouth/Throat: Oropharynx is clear and moist    Eyes: Pupils are equal, round, and reactive to light  Conjunctivae and EOM are normal    Neck: Normal range of motion  Neck supple  No thyromegaly present  Cardiovascular: Normal rate, regular rhythm and normal heart sounds  Exam reveals no gallop and no friction rub  No murmur heard  Pulmonary/Chest: Effort normal  She has no wheezes  She has no rales  Decreased in bases     Abdominal: Soft  She exhibits distension  There is no tenderness  There is no rebound  Somewhat hypoactive BS   Musculoskeletal: Normal range of motion  She exhibits no edema, tenderness or deformity  Neurological: She is alert and oriented to person, place, and time  No cranial nerve deficit  With some baseline forgetfulness   Skin: Skin is warm and dry  No rash noted  No erythema  Psychiatric: She has a normal mood and affect  Her behavior is normal  Thought content normal    Vitals reviewed  Additional Data:     Labs:    Results from last 7 days   Lab Units 07/28/19  0507  07/25/19  0526   WBC Thousand/uL 9 80   < > 9 00   HEMOGLOBIN g/dL 10 2*   < > 11 0*   HEMATOCRIT % 30 2*   < > 32 8*   PLATELETS Thousands/uL 314   < > 332   NEUTROS PCT %  --   --  77*   LYMPHS PCT %  --   --  12*   MONOS PCT %  --   --  10   EOS PCT %  --   --  1    < > = values in this interval not displayed  Results from last 7 days   Lab Units 07/28/19  0507  07/25/19  0526   POTASSIUM mmol/L 3 4*   < > 3 7   CHLORIDE mmol/L 106   < > 105   CO2 mmol/L 26   < > 27   BUN mg/dL 28*   < > 35*   CREATININE mg/dL 1 31*   < > 1 29*   CALCIUM mg/dL 8 1*   < > 7 9*   ALK PHOS U/L  --   --  68   ALT U/L  --   --  40   AST U/L  --   --  23    < > = values in this interval not displayed       Results from last 7 days   Lab Units 07/24/19  1838   INR  1 06               * I Have Reviewed All Lab Data Listed Above  * Additional Pertinent Lab Tests Reviewed: [address] Admission  Reviewed    Imaging:  Imaging Reports Reviewed Today Include: KUB and CXR pending     Recent Cultures (last 7 days):     Results from last 7 days   Lab Units 07/25/19  0621 07/24/19 1952 07/24/19  0450   BLOOD CULTURE   --  No Growth at 48 hrs  No Growth at 48 hrs  --    SPUTUM CULTURE   --   --  1+ Growth of   Commensal respiratory juanita only; No significant growth of Staph aureus/MRSA or Pseudomonas aeruginosa  GRAM STAIN RESULT   --   --  1+ Epithelial cells per low power field*  Rare Polys*  2+ Gram positive cocci in pairs, chains and clusters*  1+ Gram positive rods*  1+ Gram negative rods*   URINE CULTURE  20,000-29,000 cfu/ml   --   --        Last 24 Hours Medication List:     Current Facility-Administered Medications:  dextrose 5 % and sodium chloride 0 45 % 60 mL/hr Intravenous Continuous PAOLO Krishnamurthy   heparin (porcine) 5,000 Units Subcutaneous [facility] PAOLO Krishnamurthy   hydrALAZINE 10 mg Intravenous Q6H PRN PAOLO Krishnamurthy   ondansetron 4 mg Intravenous Q6H PRN PAOLO Krishnamurthy   pantoprazole 40 mg Intravenous Q24H [address] PAOLO Saul   phenol 1 spray Mouth/Throat Q2H PRN Manuel Velasquez MD   potassium chloride 20 mEq Intravenous Once PAOLO Krishnamurthy   Followed by       potassium chloride 20 mEq Intravenous Once PAOLO Krishnamurthy        Today, Patient Was Seen By: PAOLO Krishnamurthy    ** Please Note: Dictation voice to text software may have been used in the creation of this document   **

## 2019-07-28 NOTE — NURSING NOTE
Pt HR on telelmetry fluctuates between 110-140  IV lopressor ordered  Pt BP at this time 80/42, taken manually by RN  Pt is asymptomatic  Lopressor held at this time and 250 cc bolus of NSS started  Will recheck BP after bolus  Will continue to monitor

## 2019-07-28 NOTE — CONSULTS
Consultation - Cardiology   Rebecca Stephenson 80 y o  female MRN: [id number]  Unit/Bed#: -01 Encounter: [id number]    Assessment/Plan     Assessment:  Atrial fibrillation with RVR (new onset)  Small-bowel obstruction  Chronic kidney disease  Hypertension  Cognitive impairment    Plan:  -agree with the IV hydration to improve blood pressure as she appears dehydrated  Recent echo shows normal LVEF  -would give Lopressor 5 milligrams IV Q 6 hours p r n  For heart rate greater than 120  I anticipate her heart rate to improve with hydration  -once hemodynamically acceptable, may start Lopressor 25 milligrams p o  B i d  And up titrate based on IV requirements  -her [id number] is at least 4 and she would benefit from anticoagulation for stroke prevention  Would recommend PT/OT, fall risk assessment prior to initiation of anticoagulation given her age, frailty, elevated fall risk  -replace electrolytes to keep potassium above 4, magnesium above 2  Potassium is improved compared to yesterday however will replace more   -rest based on clinical course    History of Present Illness   Physician Requesting Consult: Alan Valladares MD  Reason for Consult / Principal Problem:  New onset atrial fibrillation  HPI: Rebecca Stephenson is a 80y o  year old female with a history of ?CHF, hypertension who was admitted with complaints of abdominal pain, vomiting and is being managed for small-bowel obstruction  Patient is a poor historian given age, cognitive impairment has needs limited history is obtained from her  We are asked to see her for atrial fibrillation with rapid ventricular response which is new for this patient  She has been rather hypotensive overnight and has not been able to get beta blockers  When seen by me, patient appears comfortable  She denies any pain or discomfort  She says she feels well  There are no other complaints  Inpatient consult to Cardiology  Consult performed by:  Donnie CLEARY Sandro MonMD taniya  Consult ordered by: PAOLO Arellano          Review of Systems   Unable to perform ROS: Age       Historical Information   Past Medical History:   Diagnosis Date    Arthritis     CHF (congestive heart failure) (HCC)     CKD (chronic kidney disease) stage 3, GFR 30-59 ml/min ([facility] [address] ) 2018    Disease of thyroid gland     Hypertension     MI, old     Pressure injury of skin     TIA (transient ischemic attack)      Past Surgical History:   Procedure Laterality Date    ANKLE FRACTURE SURGERY      APPENDECTOMY      CAROTID ARTERY - SUBCLAVIAN ARTERY BYPASS GRAFT      HYSTERECTOMY      TONSILLECTOMY       Social History     Substance and Sexual Activity   Alcohol Use Never    Frequency: Never     Social History     Substance and Sexual Activity   Drug Use Never     Social History     Tobacco Use   Smoking Status Former Smoker    Types: Cigarettes    Last attempt to quit: 1985    Years since quittin 5   Smokeless Tobacco Never Used     Family History: non-contributory    Meds/Allergies   all current active meds have been reviewed and current meds:   Current Facility-Administered Medications   Medication Dose Route Frequency    heparin (porcine) subcutaneous injection 5,000 Units  5,000 Units Subcutaneous Q8H [address]    hydrALAZINE (APRESOLINE) injection 10 mg  10 mg Intravenous Q6H PRN    metoprolol (LOPRESSOR) injection 5 mg  5 mg Intravenous Once    ondansetron (ZOFRAN) injection 4 mg  4 mg Intravenous Q6H PRN    pantoprazole (PROTONIX) injection 40 mg  40 mg Intravenous Q24H BIANCA    phenol (CHLORASEPTIC) 1 4 % mucosal liquid 1 spray  1 spray Mouth/Throat Q2H PRN    potassium chloride 20 mEq IVPB (premix)  20 mEq Intravenous Once    sodium chloride 0 9 % infusion  60 mL/hr Intravenous Continuous     Allergies   Allergen Reactions    Penicillins      Unknown reaction    Tylenol [Acetaminophen] Dizziness     spinning sensation        Objective   Vitals: Blood pressure 122/57, pulse 68, temperature 97 5 °F (36 4 °C), temperature source Tympanic, resp  rate 18, height 5' 7" (1 702 m), weight 65 5 kg (144 lb 6 4 oz), SpO2 95 %  Orthostatic Blood Pressures      Most Recent Value   Blood Pressure  122/57 filed at 07/28/2019 1109   Patient Position - Orthostatic VS  Sitting filed at 07/28/2019 1109            Intake/Output Summary (Last 24 hours) at 7/28/2019 1206  Last data filed at 7/27/2019 1756  Gross per 24 hour   Intake 300 ml   Output    Net 300 ml       Invasive Devices     Peripheral Intravenous Line            Peripheral IV 07/24/19 Left Antecubital 3 days                Physical Exam   Constitutional: She is oriented to person, place, and time  She appears lethargic  She is easily aroused  HENT:   Head: Normocephalic and atraumatic  Eyes: Pupils are equal, round, and reactive to light  EOM are normal    Cardiovascular: An irregularly irregular rhythm present  Pulmonary/Chest: Effort normal and breath sounds normal    Abdominal: Soft  Neurological: She is oriented to person, place, and time and easily aroused  She appears lethargic  Skin: Skin is warm, dry and intact  Lab Results:   I have personally reviewed pertinent lab results  CBC with diff:   Results from last 7 days   Lab Units 07/28/19  0507   WBC Thousand/uL 9 80   RBC Million/uL 3 46*   HEMOGLOBIN g/dL 10 2*   HEMATOCRIT % 30 2*   MCV fL 87   MCH pg 29 4   MCHC g/dL 33 6   RDW % 15 1*   MPV fL 8 4*   PLATELETS Thousands/uL 314     CMP:   Results from last 7 days   Lab Units 07/28/19  0507  07/25/19  0526   SODIUM mmol/L 138   < > 140   POTASSIUM mmol/L 3 4*   < > 3 7   CHLORIDE mmol/L 106   < > 105   CO2 mmol/L 26   < > 27   BUN mg/dL 28*   < > 35*   CREATININE mg/dL 1 31*   < > 1 29*   CALCIUM mg/dL 8 1*   < > 7 9*   AST U/L  --   --  23   ALT U/L  --   --  40   ALK PHOS U/L  --   --  68   EGFR ml/min/1 73sq m 36   < > 37    < > = values in this interval not displayed       Troponin:   0   Lab Value Date/Time    TROPONINI 0 04 (H) 07/25/2019 0028    TROPONINI 0 03 07/24/2019 2219    TROPONINI 0 03 07/24/2019 1838    TROPONINI 0 15 (H) 08/25/2018 0913    TROPONINI 0 15 (H) 08/25/2018 0616    TROPONINI 0 19 (H) 08/25/2018 0107     BNP:   Results from last 7 days   Lab Units 07/28/19  0507   POTASSIUM mmol/L 3 4*   CHLORIDE mmol/L 106   CO2 mmol/L 26   BUN mg/dL 28*   CREATININE mg/dL 1 31*   CALCIUM mg/dL 8 1*   EGFR ml/min/1 73sq m 36     Magnesium:   Results from last 7 days   Lab Units 07/28/19  0507   MAGNESIUM mg/dL 2 1     Imaging: I have personally reviewed pertinent reports  EKG:  Atrial fibrillation with rapid ventricular response  VTE Prophylaxis: Heparin    Code Status: Level 1 - Full Code  Advance Directive and Living Will: Yes    Power of : Yes  POLST:      Counseling / Coordination of Care  Total floor / unit time spent today 30 minutes  Greater than 50% of total time was spent with the patient and / or family counseling and / or coordination of care  A description of the counseling / coordination of care:  Discussions with the primary team, nursing staff

## 2019-07-28 NOTE — ASSESSMENT & PLAN NOTE
· Baseline creatinine appears to be between 1 2-1 4 mg/dL   · Slight bumped in Cr 1 16--> 1 31  · Hold lasix today   · Check CXR   · Will continue to monitor renal function closely   · Avoid nephrotoxins

## 2019-07-28 NOTE — PROGRESS NOTES
Progress Note - General Surgery   Candy Goddard 80 y o  female MRN: [id number]  Unit/Bed#: -01 Encounter: [id number]    Assessment:  Patient is a pleasant 44-year-old female hospital day 4 with a high-grade partial mechanical small-bowel obstruction  The patient's small-bowel follow-through was completed yesterday  Is consistent with a high-grade partial small-bowel obstruction  Contrast did reach the colon  The nasogastric tube was removed last evening  There is 1 documented report of emesis last evening  This morning the patient denies complaints of nausea or abdominal pain  She reports feeling hungry  On physical examination she is awake and reliable as an historian  Her abdomen is soft modestly distended nontender to percussion and palpation in 4 quadrants  Plan:  Patient appears clinically stable to present time to continue with the trial of clear liquids  Based upon her clinical course will make additional treatment recommendations  If she fails and has recurrent nausea vomiting we will recommend laparotomy and lysis of adhesions  The son was updated by phone regarding his mother's current condition and the treatment plan  All questions were answered to his satisfaction  Subjective/Objective   Chief Complaint:  Nausea vomiting    Subjective:  Patient reports feeling generally well this morning    Objective:  Abdomen soft distended nontender to palpation    Blood pressure 104/59, pulse (!) 52, temperature 97 6 °F (36 4 °C), temperature source Tympanic, resp  rate 18, height 5' 7" (1 702 m), weight 65 5 kg (144 lb 6 4 oz), SpO2 95 %  ,Body mass index is 22 62 kg/m²        Intake/Output Summary (Last 24 hours) at 7/28/2019 0841  Last data filed at 7/27/2019 1756  Gross per 24 hour   Intake 320 ml   Output    Net 320 ml       Invasive Devices     Peripheral Intravenous Line            Peripheral IV 07/24/19 Left Antecubital 3 days                Lab, Imaging and other studies:I have personally reviewed pertinent lab results

## 2019-07-28 NOTE — ASSESSMENT & PLAN NOTE
Wt Readings from Last 3 Encounters:   07/28/19 65 5 kg (144 lb 6 4 oz)   08/27/18 60 3 kg (133 lb)   08/25/18 65 8 kg (145 lb)     · Small bilateral pleural effusions and pulmonary vascular congestion   · ECHO- LVEF 50%  Grade 1 diastolic dysfunction    · Daily weight   · Monitor IVF/volume status closely   · Will hold lasix for today due to elevated creatinine and vomiting last night

## 2019-07-28 NOTE — ASSESSMENT & PLAN NOTE
· Appreciate surgical input  · NGT was discontinued 7/27 evening as small-bowel series shows contrast reached the colon  However, a high-grade partial small bowel obstruction persists  There is 1 episode of emesis overnight  · Clinically, patient denies any nausea, vomiting or abdominal pain this a m     Surgery would like to continue to monitor on clear liquid diet for now  If the patient is unable to tolerate diet and recurrent nausea and vomiting, she would likely need a laparotomy and lysis of adhesions     · Continue with gentle IV fluid  · Defer to surgery regarding further recommendations

## 2019-07-28 NOTE — NURSING NOTE
Pt HR sounds irregular  Pt has no history of afib on file  EKG ordered by NP  EKG shows rapid afib  Pt asymptomatic  Pt placed on telemetry and cardiology consult ordered  Will continue to monitor  Call bell in reach

## 2019-07-28 NOTE — PLAN OF CARE
Problem: Potential for Falls  Goal: Patient will remain free of falls  Description  INTERVENTIONS:  - Assess patient frequently for physical needs  -  Identify cognitive and physical deficits and behaviors that affect risk of falls  -  [place] fall precautions as indicated by assessment   - Educate patient/family on patient safety including physical limitations  - Instruct patient to call for assistance with activity based on assessment  - Modify environment to reduce risk of injury  - Consider OT/PT consult to assist with strengthening/mobility  7/28/2019 1007 by Nicolasa Stringer RN  Outcome: Progressing  7/28/2019 1007 by Nicolasa Stringer RN  Outcome: Progressing     Problem: Prexisting or High Potential for Compromised Skin Integrity  Goal: Skin integrity is maintained or improved  Description  INTERVENTIONS:  - Identify patients at risk for skin breakdown  - Assess and monitor skin integrity  - Assess and monitor nutrition and hydration status  - Monitor labs (i e  albumin)  - Assess for incontinence   - Turn and reposition patient  - Assist with mobility/ambulation  - Relieve pressure over bony prominences  - Avoid friction and shearing  - Provide appropriate hygiene as needed including keeping skin clean and dry  - Evaluate need for skin moisturizer/barrier cream  - Collaborate with interdisciplinary team (i e  Nutrition, Rehabilitation, etc )   - Patient/family teaching  7/28/2019 1007 by Nicolasa Stringer RN  Outcome: Progressing  7/28/2019 1007 by Nicolasa Stringer RN  Outcome: Progressing     Problem: Nutrition/Hydration-ADULT  Goal: Nutrient/Hydration intake appropriate for improving, restoring or maintaining nutritional needs  Description  Monitor and assess patient's nutrition/hydration status for malnutrition (ex- brittle hair, bruises, dry skin, pale skin and conjunctiva, muscle wasting, smooth red tongue, and disorientation)   Collaborate with interdisciplinary team and initiate plan and interventions as ordered  Monitor patient's weight and dietary intake as ordered or per policy  Utilize nutrition screening tool and intervene per policy  Determine patient's food preferences and provide high-protein, high-caloric foods as appropriate       INTERVENTIONS:  - Monitor oral intake, urinary output, labs, and treatment plans  - Assess nutrition and hydration status and recommend course of action  - Evaluate amount of meals eaten  - Assist patient with eating if necessary   - Allow adequate time for meals  - Recommend/ encourage appropriate diets, oral nutritional supplements, and vitamin/mineral supplements  - Order, calculate, and assess calorie counts as needed  - Recommend, monitor, and adjust tube feedings and TPN/PPN based on assessed needs  - Assess need for intravenous fluids  - Provide specific nutrition/hydration education as appropriate  - Include patient/family/caregiver in decisions related to nutrition  7/28/2019 1007 by Barbara Potter RN  Outcome: Progressing  7/28/2019 1007 by Barbara Potter RN  Outcome: Progressing     Problem: PAIN - ADULT  Goal: Verbalizes/displays adequate comfort level or baseline comfort level  Description  Interventions:  - Encourage patient to monitor pain and request assistance  - Assess pain using appropriate pain scale  - Administer analgesics based on type and severity of pain and evaluate response  - Implement non-pharmacological measures as appropriate and evaluate response  - Consider cultural and social influences on pain and pain management  - Notify physician/advanced practitioner if interventions unsuccessful or patient reports new pain  7/28/2019 1007 by Barbara Potter RN  Outcome: Progressing  7/28/2019 1007 by Barbara Potter RN  Outcome: Progressing     Problem: INFECTION - ADULT  Goal: Absence or prevention of progression during hospitalization  Description  INTERVENTIONS:  - Assess and monitor for signs and symptoms of infection  - Monitor lab/diagnostic results  - Monitor all insertion sites, i e  indwelling lines, tubes, and drains  - Administer medications as ordered  - Instruct and encourage patient and family to use good hand hygiene technique  - Identify and instruct in appropriate isolation precautions for identified infection/condition   7/28/2019 1007 by Amy Beck RN  Outcome: Progressing  7/28/2019 1007 by Amy Beck RN  Outcome: Progressing  Goal: Absence of fever/infection during neutropenic period  Description  INTERVENTIONS:  - Monitor WBC     7/28/2019 1007 by Amy Beck RN  Outcome: Progressing  7/28/2019 1007 by Amy Beck RN  Outcome: Progressing     Problem: SAFETY ADULT  Goal: Patient will remain free of falls  Description  INTERVENTIONS:  - Assess patient frequently for physical needs  -  Identify cognitive and physical deficits and behaviors that affect risk of falls    -  [place] fall precautions as indicated by assessment   - Educate patient/family on patient safety including physical limitations  - Instruct patient to call for assistance with activity based on assessment  - Modify environment to reduce risk of injury  - Consider OT/PT consult to assist with strengthening/mobility  7/28/2019 1007 by Amy Beck RN  Outcome: Progressing  7/28/2019 1007 by Amy Beck RN  Outcome: Progressing  Goal: Maintain or return to baseline ADL function  Description  INTERVENTIONS:  -  Assess patient's ability to carry out ADLs; assess patient's baseline for ADL function and identify physical deficits which impact ability to perform ADLs (bathing, care of mouth/teeth, toileting, grooming, dressing, etc )  - Assess/evaluate cause of self-care deficits   - Assess range of motion  - Assess patient's mobility; develop plan if impaired  - Assess patient's need for assistive devices and provide as appropriate  - Encourage maximum independence but intervene and supervise when necessary  ¯ Involve family in performance of ADLs  ¯ Assess for home care needs following discharge   ¯ Request OT consult to assist with ADL evaluation and planning for discharge  ¯ Provide patient education as appropriate  7/28/2019 1007 by Eran Child RN  Outcome: Progressing  7/28/2019 1007 by Eran Child RN  Outcome: Progressing  Goal: Maintain or return mobility status to optimal level  Description  INTERVENTIONS:  - Assess patient's baseline mobility status (ambulation, transfers, stairs, etc )    - Identify cognitive and physical deficits and behaviors that affect mobility  - Identify mobility aids required to assist with transfers and/or ambulation (gait belt, sit-to-stand, lift, walker, cane, etc )  - [place] fall precautions as indicated by assessment  - Record patient progress and toleration of activity level on Mobility SBAR; progress patient to next Phase/Stage  - Instruct patient to call for assistance with activity based on assessment  - Request Rehabilitation consult to assist with strengthening/weightbearing, etc   7/28/2019 1007 by Eran Child RN  Outcome: Progressing  7/28/2019 1007 by Eran Child RN  Outcome: Progressing     Problem: DISCHARGE PLANNING  Goal: Discharge to home or other facility with appropriate resources  Description  INTERVENTIONS:  - Identify barriers to discharge w/patient and caregiver  - Arrange for needed discharge resources and transportation as appropriate  - Identify discharge learning needs (meds, wound care, etc )  - Arrange for interpretive services to assist at discharge as needed  - Refer to Case Management Department for coordinating discharge planning if the patient needs post-hospital services based on physician/advanced practitioner order or complex needs related to functional status, cognitive ability, or social support system  7/28/2019 1007 by Eran Child RN  Outcome: Progressing  7/28/2019 1007 by Eran Child RN  Outcome: Progressing     Problem: Knowledge Deficit  Goal: Patient/family/caregiver demonstrates understanding of disease process, treatment plan, medications, and discharge instructions  Description  Complete learning assessment and assess knowledge base    Interventions:  - Provide teaching at level of understanding  - Provide teaching via preferred learning methods  7/28/2019 1007 by Nereyda Ayala RN  Outcome: Progressing  7/28/2019 1007 by Nereyda Ayala RN  Outcome: Progressing     Problem: GASTROINTESTINAL - ADULT  Goal: Minimal or absence of nausea and/or vomiting  Description  INTERVENTIONS:  - Administer IV fluids as ordered to ensure adequate hydration  - Maintain NPO status until nausea and vomiting are resolved  - Nasogastric tube as ordered  - Administer ordered antiemetic medications as needed  - Provide nonpharmacologic comfort measures as appropriate  - Advance diet as tolerated, if ordered  - Nutrition services referral to assist patient with adequate nutrition and appropriate food choices  7/28/2019 1007 by Nereyda Ayala RN  Outcome: Progressing  7/28/2019 1007 by Nereyda Ayala RN  Outcome: Progressing  Goal: Maintains adequate nutritional intake  Description  INTERVENTIONS:  - Monitor percentage of each meal consumed  - Identify factors contributing to decreased intake, treat as appropriate  - Assist with meals as needed  - Monitor I&O, WT and lab values  - Obtain nutrition services referral as needed  7/28/2019 1007 by Nereyda Ayala RN  Outcome: Progressing  7/28/2019 1007 by Nereyda Ayala RN  Outcome: Progressing     Problem: METABOLIC, FLUID AND ELECTROLYTES - ADULT  Goal: Electrolytes maintained within normal limits  Description  INTERVENTIONS:  - Monitor labs and assess patient for signs and symptoms of electrolyte imbalances  - Administer electrolyte replacement as ordered  - Monitor response to electrolyte replacements, including repeat lab results as appropriate  - Instruct patient on fluid and nutrition as appropriate  7/28/2019 1007 by Nereyda Ayala RN  Outcome: Progressing  7/28/2019 1007 by Roberto Huff ENIO Chaves  Outcome: Progressing  Goal: Fluid balance maintained  Description  INTERVENTIONS:  - Monitor labs and assess for signs and symptoms of volume excess or deficit  - Monitor I/O and WT  - Instruct patient on fluid and nutrition as appropriate  7/28/2019 1007 by Brooklyn Kraus RN  Outcome: Progressing  7/28/2019 1007 by Brooklyn Kraus RN  Outcome: Progressing     Problem: SKIN/TISSUE INTEGRITY - ADULT  Goal: Skin integrity remains intact  Description  INTERVENTIONS  - Identify patients at risk for skin breakdown  - Assess and monitor skin integrity  - Assess and monitor nutrition and hydration status  - Monitor labs (i e  albumin)  - Assess for incontinence   - Turn and reposition patient  - Assist with mobility/ambulation  - Relieve pressure over bony prominences  - Avoid friction and shearing  - Provide appropriate hygiene as needed including keeping skin clean and dry  - Evaluate need for skin moisturizer/barrier cream  - Collaborate with interdisciplinary team (i e  Nutrition, Rehabilitation, etc )   - Patient/family teaching  7/28/2019 1007 by Brooklyn Kraus RN  Outcome: Progressing  7/28/2019 1007 by Brooklyn Kraus RN  Outcome: Progressing

## 2019-07-28 NOTE — NURSING NOTE
Pt alert and orientated, pleasant and cooperative  States she feels much better without the ng tube, tolerating clears  Denies pain  Still having loose stools but feels it is better today  Repositioning q 2 hours  Hourly rounding reviewed   Call bell within reach

## 2019-07-29 ENCOUNTER — APPOINTMENT (INPATIENT)
Dept: RADIOLOGY | Facility: HOSPITAL | Age: 84
DRG: 389 | End: 2019-07-29
Payer: MEDICARE

## 2019-07-29 PROBLEM — K56.600 SMALL BOWEL OBSTRUCTION, PARTIAL (HCC): Status: ACTIVE | Noted: 2019-07-24

## 2019-07-29 LAB
ABO GROUP BLD: NORMAL
ALBUMIN SERPL BCP-MCNC: 2.6 G/DL (ref 3.5–5.7)
ALP SERPL-CCNC: 55 U/L (ref 55–165)
ALT SERPL W P-5'-P-CCNC: 20 U/L (ref 7–52)
ANION GAP SERPL CALCULATED.3IONS-SCNC: 8 MMOL/L (ref 4–13)
AST SERPL W P-5'-P-CCNC: 28 U/L (ref 13–39)
BASOPHILS # BLD AUTO: 0 THOUSANDS/ΜL (ref 0–0.1)
BASOPHILS NFR BLD AUTO: 0 % (ref 0–2)
BILIRUB SERPL-MCNC: 0.4 MG/DL (ref 0.2–1)
BLD GP AB SCN SERPL QL: NEGATIVE
BUN SERPL-MCNC: 26 MG/DL (ref 7–25)
CALCIUM SERPL-MCNC: 7.6 MG/DL (ref 8.6–10.5)
CHLORIDE SERPL-SCNC: 111 MMOL/L (ref 98–107)
CO2 SERPL-SCNC: 22 MMOL/L (ref 21–31)
CREAT SERPL-MCNC: 1.21 MG/DL (ref 0.6–1.2)
EOSINOPHIL # BLD AUTO: 0.2 THOUSAND/ΜL (ref 0–0.61)
EOSINOPHIL NFR BLD AUTO: 2 % (ref 0–5)
ERYTHROCYTE [DISTWIDTH] IN BLOOD BY AUTOMATED COUNT: 15.5 % (ref 11.5–14.5)
GFR SERPL CREATININE-BSD FRML MDRD: 40 ML/MIN/1.73SQ M
GLUCOSE SERPL-MCNC: 89 MG/DL (ref 65–99)
HCT VFR BLD AUTO: 27.4 % (ref 42–47)
HGB BLD-MCNC: 9.1 G/DL (ref 12–16)
INR PPP: 1.06 (ref 0.9–1.5)
LYMPHOCYTES # BLD AUTO: 1.2 THOUSANDS/ΜL (ref 0.6–4.47)
LYMPHOCYTES NFR BLD AUTO: 16 % (ref 21–51)
MAGNESIUM SERPL-MCNC: 2.1 MG/DL (ref 1.9–2.7)
MCH RBC QN AUTO: 29.6 PG (ref 26–34)
MCHC RBC AUTO-ENTMCNC: 33.3 G/DL (ref 31–37)
MCV RBC AUTO: 89 FL (ref 81–99)
MONOCYTES # BLD AUTO: 0.8 THOUSAND/ΜL (ref 0.17–1.22)
MONOCYTES NFR BLD AUTO: 11 % (ref 2–12)
NEUTROPHILS # BLD AUTO: 5.3 THOUSANDS/ΜL (ref 1.4–6.5)
NEUTS SEG NFR BLD AUTO: 71 % (ref 42–75)
PHOSPHATE SERPL-MCNC: 2 MG/DL (ref 3–5.5)
PLATELET # BLD AUTO: 303 THOUSANDS/UL (ref 149–390)
PMV BLD AUTO: 9.1 FL (ref 8.6–11.7)
POTASSIUM SERPL-SCNC: 3.9 MMOL/L (ref 3.5–5.5)
PROT SERPL-MCNC: 5.1 G/DL (ref 6.4–8.9)
PROTHROMBIN TIME: 12.3 SECONDS (ref 10.2–13)
RBC # BLD AUTO: 3.08 MILLION/UL (ref 3.9–5.2)
RH BLD: POSITIVE
SODIUM SERPL-SCNC: 141 MMOL/L (ref 134–143)
SPECIMEN EXPIRATION DATE: NORMAL
WBC # BLD AUTO: 7.4 THOUSAND/UL (ref 4.8–10.8)

## 2019-07-29 PROCEDURE — 84100 ASSAY OF PHOSPHORUS: CPT | Performed by: SURGERY

## 2019-07-29 PROCEDURE — 99232 SBSQ HOSP IP/OBS MODERATE 35: CPT | Performed by: INTERNAL MEDICINE

## 2019-07-29 PROCEDURE — 97530 THERAPEUTIC ACTIVITIES: CPT

## 2019-07-29 PROCEDURE — 80053 COMPREHEN METABOLIC PANEL: CPT | Performed by: SURGERY

## 2019-07-29 PROCEDURE — NC001 PR NO CHARGE: Performed by: SURGERY

## 2019-07-29 PROCEDURE — 86901 BLOOD TYPING SEROLOGIC RH(D): CPT | Performed by: SURGERY

## 2019-07-29 PROCEDURE — 86850 RBC ANTIBODY SCREEN: CPT | Performed by: SURGERY

## 2019-07-29 PROCEDURE — 99232 SBSQ HOSP IP/OBS MODERATE 35: CPT | Performed by: PHYSICIAN ASSISTANT

## 2019-07-29 PROCEDURE — C9113 INJ PANTOPRAZOLE SODIUM, VIA: HCPCS | Performed by: NURSE PRACTITIONER

## 2019-07-29 PROCEDURE — 86900 BLOOD TYPING SEROLOGIC ABO: CPT | Performed by: SURGERY

## 2019-07-29 PROCEDURE — 83735 ASSAY OF MAGNESIUM: CPT | Performed by: SURGERY

## 2019-07-29 PROCEDURE — 85610 PROTHROMBIN TIME: CPT | Performed by: SURGERY

## 2019-07-29 PROCEDURE — 97110 THERAPEUTIC EXERCISES: CPT

## 2019-07-29 PROCEDURE — 85025 COMPLETE CBC W/AUTO DIFF WBC: CPT | Performed by: SURGERY

## 2019-07-29 PROCEDURE — 74022 RADEX COMPL AQT ABD SERIES: CPT

## 2019-07-29 RX ADMIN — PANTOPRAZOLE SODIUM 40 MG: 40 INJECTION, POWDER, LYOPHILIZED, FOR SOLUTION INTRAVENOUS at 10:04

## 2019-07-29 RX ADMIN — HEPARIN SODIUM 5000 UNITS: 5000 INJECTION, SOLUTION INTRAVENOUS; SUBCUTANEOUS at 05:25

## 2019-07-29 RX ADMIN — HEPARIN SODIUM 5000 UNITS: 5000 INJECTION, SOLUTION INTRAVENOUS; SUBCUTANEOUS at 15:49

## 2019-07-29 RX ADMIN — HEPARIN SODIUM 5000 UNITS: 5000 INJECTION, SOLUTION INTRAVENOUS; SUBCUTANEOUS at 21:00

## 2019-07-29 RX ADMIN — SODIUM CHLORIDE 60 ML/HR: 9 INJECTION, SOLUTION INTRAVENOUS at 05:25

## 2019-07-29 RX ADMIN — METOPROLOL TARTRATE 25 MG: 25 TABLET, FILM COATED ORAL at 20:52

## 2019-07-29 RX ADMIN — METOPROLOL TARTRATE 25 MG: 25 TABLET, FILM COATED ORAL at 10:04

## 2019-07-29 NOTE — PLAN OF CARE
Problem: Potential for Falls  Goal: Patient will remain free of falls  Description  INTERVENTIONS:  - Assess patient frequently for physical needs  -  Identify cognitive and physical deficits and behaviors that affect risk of falls  -  [place] fall precautions as indicated by assessment   - Educate patient/family on patient safety including physical limitations  - Instruct patient to call for assistance with activity based on assessment  - Modify environment to reduce risk of injury  - Consider OT/PT consult to assist with strengthening/mobility  Outcome: Progressing     Problem: Prexisting or High Potential for Compromised Skin Integrity  Goal: Skin integrity is maintained or improved  Description  INTERVENTIONS:  - Identify patients at risk for skin breakdown  - Assess and monitor skin integrity  - Assess and monitor nutrition and hydration status  - Monitor labs (i e  albumin)  - Assess for incontinence   - Turn and reposition patient  - Assist with mobility/ambulation  - Relieve pressure over bony prominences  - Avoid friction and shearing  - Provide appropriate hygiene as needed including keeping skin clean and dry  - Evaluate need for skin moisturizer/barrier cream  - Collaborate with interdisciplinary team (i e  Nutrition, Rehabilitation, etc )   - Patient/family teaching  Outcome: Progressing     Problem: Nutrition/Hydration-ADULT  Goal: Nutrient/Hydration intake appropriate for improving, restoring or maintaining nutritional needs  Description  Monitor and assess patient's nutrition/hydration status for malnutrition (ex- brittle hair, bruises, dry skin, pale skin and conjunctiva, muscle wasting, smooth red tongue, and disorientation)  Collaborate with interdisciplinary team and initiate plan and interventions as ordered  Monitor patient's weight and dietary intake as ordered or per policy  Utilize nutrition screening tool and intervene per policy   Determine patient's food preferences and provide high-protein, high-caloric foods as appropriate       INTERVENTIONS:  - Monitor oral intake, urinary output, labs, and treatment plans  - Assess nutrition and hydration status and recommend course of action  - Evaluate amount of meals eaten  - Assist patient with eating if necessary   - Allow adequate time for meals  - Recommend/ encourage appropriate diets, oral nutritional supplements, and vitamin/mineral supplements  - Order, calculate, and assess calorie counts as needed  - Recommend, monitor, and adjust tube feedings and TPN/PPN based on assessed needs  - Assess need for intravenous fluids  - Provide specific nutrition/hydration education as appropriate  - Include patient/family/caregiver in decisions related to nutrition  Outcome: Progressing     Problem: PAIN - ADULT  Goal: Verbalizes/displays adequate comfort level or baseline comfort level  Description  Interventions:  - Encourage patient to monitor pain and request assistance  - Assess pain using appropriate pain scale  - Administer analgesics based on type and severity of pain and evaluate response  - Implement non-pharmacological measures as appropriate and evaluate response  - Consider cultural and social influences on pain and pain management  - Notify physician/advanced practitioner if interventions unsuccessful or patient reports new pain  Outcome: Progressing     Problem: INFECTION - ADULT  Goal: Absence or prevention of progression during hospitalization  Description  INTERVENTIONS:  - Assess and monitor for signs and symptoms of infection  - Monitor lab/diagnostic results  - Monitor all insertion sites, i e  indwelling lines, tubes, and drains  - Administer medications as ordered  - Instruct and encourage patient and family to use good hand hygiene technique  - Identify and instruct in appropriate isolation precautions for identified infection/condition   Outcome: Progressing  Goal: Absence of fever/infection during neutropenic period  Description  INTERVENTIONS:  - Monitor WBC     Outcome: Progressing     Problem: SAFETY ADULT  Goal: Patient will remain free of falls  Description  INTERVENTIONS:  - Assess patient frequently for physical needs  -  Identify cognitive and physical deficits and behaviors that affect risk of falls    -  [place] fall precautions as indicated by assessment   - Educate patient/family on patient safety including physical limitations  - Instruct patient to call for assistance with activity based on assessment  - Modify environment to reduce risk of injury  - Consider OT/PT consult to assist with strengthening/mobility  Outcome: Progressing  Goal: Maintain or return to baseline ADL function  Description  INTERVENTIONS:  -  Assess patient's ability to carry out ADLs; assess patient's baseline for ADL function and identify physical deficits which impact ability to perform ADLs (bathing, care of mouth/teeth, toileting, grooming, dressing, etc )  - Assess/evaluate cause of self-care deficits   - Assess range of motion  - Assess patient's mobility; develop plan if impaired  - Assess patient's need for assistive devices and provide as appropriate  - Encourage maximum independence but intervene and supervise when necessary  ¯ Involve family in performance of ADLs  ¯ Assess for home care needs following discharge   ¯ Request OT consult to assist with ADL evaluation and planning for discharge  ¯ Provide patient education as appropriate  Outcome: Progressing  Goal: Maintain or return mobility status to optimal level  Description  INTERVENTIONS:  - Assess patient's baseline mobility status (ambulation, transfers, stairs, etc )    - Identify cognitive and physical deficits and behaviors that affect mobility  - Identify mobility aids required to assist with transfers and/or ambulation (gait belt, sit-to-stand, lift, walker, cane, etc )  - [place] fall precautions as indicated by assessment  - Record patient progress and toleration of activity level on Mobility SBAR; progress patient to next Phase/Stage  - Instruct patient to call for assistance with activity based on assessment  - Request Rehabilitation consult to assist with strengthening/weightbearing, etc   Outcome: Progressing     Problem: DISCHARGE PLANNING  Goal: Discharge to home or other facility with appropriate resources  Description  INTERVENTIONS:  - Identify barriers to discharge w/patient and caregiver  - Arrange for needed discharge resources and transportation as appropriate  - Identify discharge learning needs (meds, wound care, etc )  - Arrange for interpretive services to assist at discharge as needed  - Refer to Case Management Department for coordinating discharge planning if the patient needs post-hospital services based on physician/advanced practitioner order or complex needs related to functional status, cognitive ability, or social support system  Outcome: Progressing     Problem: Knowledge Deficit  Goal: Patient/family/caregiver demonstrates understanding of disease process, treatment plan, medications, and discharge instructions  Description  Complete learning assessment and assess knowledge base    Interventions:  - Provide teaching at level of understanding  - Provide teaching via preferred learning methods  Outcome: Progressing     Problem: GASTROINTESTINAL - ADULT  Goal: Minimal or absence of nausea and/or vomiting  Description  INTERVENTIONS:  - Administer IV fluids as ordered to ensure adequate hydration  - Maintain NPO status until nausea and vomiting are resolved  - Nasogastric tube as ordered  - Administer ordered antiemetic medications as needed  - Provide nonpharmacologic comfort measures as appropriate  - Advance diet as tolerated, if ordered  - Nutrition services referral to assist patient with adequate nutrition and appropriate food choices  Outcome: Progressing  Goal: Maintains adequate nutritional intake  Description  INTERVENTIONS:  - Monitor percentage of each meal consumed  - Identify factors contributing to decreased intake, treat as appropriate  - Assist with meals as needed  - Monitor I&O, WT and lab values  - Obtain nutrition services referral as needed  Outcome: Progressing     Problem: METABOLIC, FLUID AND ELECTROLYTES - ADULT  Goal: Electrolytes maintained within normal limits  Description  INTERVENTIONS:  - Monitor labs and assess patient for signs and symptoms of electrolyte imbalances  - Administer electrolyte replacement as ordered  - Monitor response to electrolyte replacements, including repeat lab results as appropriate  - Instruct patient on fluid and nutrition as appropriate  Outcome: Progressing  Goal: Fluid balance maintained  Description  INTERVENTIONS:  - Monitor labs and assess for signs and symptoms of volume excess or deficit  - Monitor I/O and WT  - Instruct patient on fluid and nutrition as appropriate  Outcome: Progressing     Problem: SKIN/TISSUE INTEGRITY - ADULT  Goal: Skin integrity remains intact  Description  INTERVENTIONS  - Identify patients at risk for skin breakdown  - Assess and monitor skin integrity  - Assess and monitor nutrition and hydration status  - Monitor labs (i e  albumin)  - Assess for incontinence   - Turn and reposition patient  - Assist with mobility/ambulation  - Relieve pressure over bony prominences  - Avoid friction and shearing  - Provide appropriate hygiene as needed including keeping skin clean and dry  - Evaluate need for skin moisturizer/barrier cream  - Collaborate with interdisciplinary team (i e  Nutrition, Rehabilitation, etc )   - Patient/family teaching  Outcome: Progressing

## 2019-07-29 NOTE — PROGRESS NOTES
Progress Note - Beth Mcknight 5/24/1931, 80 y o  female MRN: [id number]    Unit/Bed#: -01 Encounter: [id number]    Primary Care Provider: Melodie Rojas DO   Date and time admitted to hospital: 7/24/2019  6:03 PM        * Small bowel obstruction [facility])  Assessment & Plan  · Appreciate surgical input  · NGT was discontinued 7/27 evening as small-bowel series shows contrast reached the colon  However, a high-grade partial small bowel obstruction persists  · Clinically, patient denies any nausea, vomiting or abdominal pain this a m  Had a bowel movement this morning  Surgery would like to continue to monitor on clear liquid diet for now  · Continue with gentle IV fluid  · Defer to surgery regarding further recommendations      Atrial fibrillation with rapid ventricular response (HCC)  Assessment & Plan  · No prior history  · Appreciate Cardiology input  · Continue IV metoprolol p r n  Chronic combined systolic and diastolic congestive heart failure [facility])  Assessment & Plan  Wt Readings from Last 3 Encounters:   07/29/19 68 kg (149 lb 14 6 oz)   08/27/18 60 3 kg (133 lb)   08/25/18 65 8 kg (145 lb)     · Small bilateral pleural effusions and pulmonary vascular congestion   · ECHO- LVEF 50%  Grade 1 diastolic dysfunction  · Daily weight   · Monitor IVF/volume status closely   · Will hold lasix due for now        Mild protein-calorie malnutrition [facility])  Assessment & Plan  Malnutrition Findings:           BMI Findings: Body mass index is 23 48 kg/m²     Nutrition consult       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, stage 2  Assessment & Plan  · POA   · Continue with local wound care   · Frequent turn and reposition     Hypothyroidism due to acquired atrophy of thyroid  Assessment & Plan  · Resume levothyroxine    CKD (chronic kidney disease) stage 3, GFR 30-59 ml/min ([facility])  Assessment & Plan  · Baseline creatinine appears to be between 1 2-1 4 mg/dL · Creatinine at baseline  · Hold lasix  · Will continue to monitor renal function closely   · Avoid nephrotoxins    Essential hypertension  Assessment & Plan  · Will hold PO medication for now  · BP has been well-controlled   · Add PRN hydralazine       VTE Pharmacologic Prophylaxis: Pharmacologic: Heparin    Patient Centered Rounds: I have performed bedside rounds with nursing staff today  Discussions with Specialists or Other Care Team Provider: n/a  Education and Discussions with Family / Patient: patient    Current Length of Stay: 5 day(s)    Current Patient Status: Inpatient   Certification Statement: The patient will continue to require additional inpatient hospital stay due to sbo    Discharge Plan: pending hospital course    Code Status: Level 1 - Full Code    Subjective:   Patient seen examined  No acute events overnight  Feels well, had bowel movement this morning    Objective:     Vitals:   Temp (24hrs), Av 4 °F (36 9 °C), Min:97 5 °F (36 4 °C), Max:99 5 °F (37 5 °C)    Temp:  [97 5 °F (36 4 °C)-99 5 °F (37 5 °C)] 97 7 °F (36 5 °C)  HR:  [] 88  Resp:  [16-18] 18  BP: ()/(41-72) 120/58  SpO2:  [93 %-99 %] 93 %  Body mass index is 23 48 kg/m²  Input and Output Summary (last 24 hours): Intake/Output Summary (Last 24 hours) at 2019 1020  Last data filed at 2019 1700  Gross per 24 hour   Intake 0 ml   Output    Net 0 ml       Physical Exam:     Physical Exam   Constitutional: She is oriented to person, place, and time  She appears well-developed and well-nourished  HENT:   Head: Normocephalic and atraumatic  Eyes: Pupils are equal, round, and reactive to light  EOM are normal    Neck: Normal range of motion  Neck supple  Cardiovascular: Normal rate and regular rhythm  Pulmonary/Chest: Effort normal and breath sounds normal    Abdominal: Soft  She exhibits distension  There is no tenderness  Hypoactive bowel sounds   Musculoskeletal: Normal range of motion  She exhibits edema  Neurological: She is alert and oriented to person, place, and time  Skin: Skin is warm  Capillary refill takes less than 2 seconds  Psychiatric: She has a normal mood and affect  Her behavior is normal  Judgment and thought content normal    Nursing note and vitals reviewed  Additional Data:     Labs:    Results from last 7 days   Lab Units 07/29/19  0522   WBC Thousand/uL 7 40   HEMOGLOBIN g/dL 9 1*   HEMATOCRIT % 27 4*   PLATELETS Thousands/uL 303   NEUTROS PCT % 71   LYMPHS PCT % 16*   MONOS PCT % 11   EOS PCT % 2     Results from last 7 days   Lab Units 07/29/19  0522   POTASSIUM mmol/L 3 9   CHLORIDE mmol/L 111*   CO2 mmol/L 22   BUN mg/dL 26*   CREATININE mg/dL 1 21*   CALCIUM mg/dL 7 6*   ALK PHOS U/L 55   ALT U/L 20   AST U/L 28     Results from last 7 days   Lab Units 07/29/19  0522   INR  1 06               * I Have Reviewed All Lab Data Listed Above  * Additional Pertinent Lab Tests Reviewed: [facility]  Reviewed    Imaging:  Imaging Reports Reviewed Today Include: n/a    Recent Cultures (last 7 days):     Results from last 7 days   Lab Units 07/25/19  0621 07/24/19 1952 07/24/19  0450   BLOOD CULTURE   --  No Growth After 4 Days  No Growth After 4 Days  --    SPUTUM CULTURE   --   --  1+ Growth of   Commensal respiratory juanita only; No significant growth of Staph aureus/MRSA or Pseudomonas aeruginosa     GRAM STAIN RESULT   --   --  1+ Epithelial cells per low power field*  Rare Polys*  2+ Gram positive cocci in pairs, chains and clusters*  1+ Gram positive rods*  1+ Gram negative rods*   URINE CULTURE  20,000-29,000 cfu/ml   --   --        Last 24 Hours Medication List:     Current Facility-Administered Medications:  heparin (porcine) 5,000 Units Subcutaneous [facility] PAOLO Saul    hydrALAZINE 10 mg Intravenous Q6H PRN PAOLO Parks    metoprolol tartrate 25 mg Oral Q12H [facility] PAOLO Parks    ondansetron 4 mg Intravenous Q6H PRN PAOLO Loredo    pantoprazole 40 mg Intravenous Q24H [facility] PAOLO Loredo    phenol 1 spray Mouth/Throat Q2H PRN Evette Simmons MD    sodium chloride 60 mL/hr Intravenous Continuous PAOLO Loredo Last Rate: 60 mL/hr (07/29/19 0525)        Today, Patient Was Seen By: Charity Cool MD    ** Please Note: Dictation voice to text software may have been used in the creation of this document   **

## 2019-07-29 NOTE — ASSESSMENT & PLAN NOTE
· Appreciate surgical input  · NGT was discontinued 7/27 evening as small-bowel series shows contrast reached the colon  However, a high-grade partial small bowel obstruction persists  · Clinically, patient denies any nausea, vomiting or abdominal pain this a m  Had a bowel movement this morning  Surgery would like to continue to monitor on clear liquid diet for now     · Continue with gentle IV fluid  · Defer to surgery regarding further recommendations

## 2019-07-29 NOTE — NURSING NOTE
bp 78/50, PA Irving notified, new order for 250 ns bolus at 125/hr carried out  Pt resting comfortably in bed, states that she is 'feeling ok'  Will continue with care plan

## 2019-07-29 NOTE — PLAN OF CARE
Problem: PHYSICAL THERAPY ADULT  Goal: Performs mobility at highest level of function for planned discharge setting  See evaluation for individualized goals  Description  Treatment/Interventions: Functional transfer training, LE strengthening/ROM, Therapeutic exercise, Endurance training, Patient/family training, Equipment eval/education, Bed mobility, Gait training, OT(&neuro re-education w/balance training)     See flowsheet documentation for full assessment, interventions and recommendations  Kaci Eng PT     Outcome: Progressing  Note:   Prognosis: Fair  Problem List: Decreased strength, Decreased endurance, Impaired balance, Decreased coordination, Decreased mobility, Decreased cognition, Impaired judgement, Decreased safety awareness, Impaired hearing, Decreased skin integrity  Assessment: Pt  found resting in bed this PM but agreeable to PT treatment  Pt  transfered from supine to sit with max Ax1 with HOB elevated and use of R BSR  Pt  able to remain sitting about 2 mins  Attempted sit to stand transfer with bed elevated and max Ax1 with RW   Pt  did not get fully erect  Pt  then assisted back into bed with max Ax1 with LE management and cues pt  to perform bridging to help boost up higher in the bed for comfort  Pt  then performed ther ex BLE as per flow sheet  Pt  denied pain during treatment but did report feeling fatigued after treatment  [facility] in room and made aware of difficulty with mobility  Pt  left with all needs in reach and with fresh water given  SCD's re-applied following treatment  Pt  tolerated treatment but would benifit from further PT treatment to improe functional mobility and functional activity tolerance  Recommendation: Short-term skilled PT     PT - OK to Discharge: No    See flowsheet documentation for full assessment

## 2019-07-29 NOTE — NURSING NOTE
New iv access  Frequent episodes of liquid brown stool, mucous present, very little odorl  No vomiting this shift

## 2019-07-29 NOTE — PROGRESS NOTES
Progress Note - Nirav Mata 5/24/1931, 80 y o  female MRN: [id number]    Unit/Bed#: -01 Encounter: [id number]    Primary Care Provider: Giovanni Labs, DO   Date and time admitted to hospital: 7/24/2019  6:03 PM        * Small bowel obstruction, partial ([address] )  Assessment & Plan  Patient admitted with signs and symptoms of a small-bowel obstruction likely secondary to adhesive disease  Surgical history includes appendectomy and hysterectomy  Imaging with small-bowel follow-through suggesting high-grade partial obstruction  Today the patient reports resolution of her symptoms after passage of a large bowel movement  However on exam there are palpable loops of distended bowel concerning for persistent partial obstruction  We will repeat obstruction series today  Will trial advancing her diet slowly to a full liquid diet then soft diet than low residue diet  If she fails medical management will discuss the role of surgical intervention with laparoscopy versus laparotomy with lysis of adhesions  All questions answered to her understanding satisfaction  Discussed with Dr Nayely Block  Subjective/Objective   Chief Complaint:  I feel fine    Subjective:  Patient without symptoms or complaints today  Reports bowel movement and passage of flatus earlier  Now without abdominal pain or bloating  Tolerating clear liquids without nausea or vomiting  No new symptoms  Specifically denies shortness of breath, chest pain, fevers  Objective:     Blood pressure 118/58, pulse 74, temperature 97 9 °F (36 6 °C), temperature source Tympanic, resp  rate 18, height 5' 7" (1 702 m), weight 68 kg (149 lb 14 6 oz), SpO2 95 %  ,Body mass index is 23 48 kg/m²        Intake/Output Summary (Last 24 hours) at 7/29/2019 1224  Last data filed at 7/28/2019 1700  Gross per 24 hour   Intake 0 ml   Output    Net 0 ml       Invasive Devices     None               Physical Exam   Constitutional: She is oriented to person, place, and time  She appears well-developed and well-nourished  No distress  Frail elderly female  HENT:   Head: Normocephalic and atraumatic  Eyes: Pupils are equal, round, and reactive to light  EOM are normal    Neck: Normal range of motion  Cardiovascular: Normal rate and regular rhythm  No murmur heard  Pulmonary/Chest: Effort normal and breath sounds normal  No respiratory distress  Abdominal: Soft  Bowel sounds are normal  She exhibits distension  There is no tenderness  Abdomen is flat and soft  Normoactive bowel sounds are noted  She denies tenderness palpation  Abdominal palpation reveals loops of distended bowel most notable in the left lelia abdomen  No guarding, rigidity, peritoneal signs  Musculoskeletal: Normal range of motion  Neurological: She is alert and oriented to person, place, and time  Skin: Skin is warm and dry  Capillary refill takes less than 2 seconds  No rash noted  She is not diaphoretic  Psychiatric: She has a normal mood and affect  Her behavior is normal          Lab, Imaging and other studies:  I have personally reviewed pertinent lab results    , CBC:   Lab Results   Component Value Date    WBC 7 40 07/29/2019    HGB 9 1 (L) 07/29/2019    HCT 27 4 (L) 07/29/2019    MCV 89 07/29/2019     07/29/2019    MCH 29 6 07/29/2019    MCHC 33 3 07/29/2019    RDW 15 5 (H) 07/29/2019    MPV 9 1 07/29/2019   , CMP:   Lab Results   Component Value Date    SODIUM 141 07/29/2019    K 3 9 07/29/2019     (H) 07/29/2019    CO2 22 07/29/2019    BUN 26 (H) 07/29/2019    CREATININE 1 21 (H) 07/29/2019    CALCIUM 7 6 (L) 07/29/2019    AST 28 07/29/2019    ALT 20 07/29/2019    ALKPHOS 55 07/29/2019    EGFR 40 07/29/2019     VTE Pharmacologic Prophylaxis: Heparin  VTE Mechanical Prophylaxis: sequential compression device

## 2019-07-29 NOTE — NURSING NOTE
bp after bolus was 95/41 right arm, 105/54 left arm  Pt sleeping comfortably  Normal saline continues at 60/hr  In and out of different rhythms, see strips in chart  Call bell is within reach

## 2019-07-29 NOTE — ASSESSMENT & PLAN NOTE
Patient admitted with signs and symptoms of a small-bowel obstruction likely secondary to adhesive disease  Surgical history includes appendectomy and hysterectomy  Imaging with small-bowel follow-through suggesting high-grade partial obstruction  Today the patient reports resolution of her symptoms after passage of a large bowel movement  However on exam there are palpable loops of distended bowel concerning for persistent partial obstruction  We will repeat obstruction series today  Will trial advancing her diet slowly to a full liquid diet then soft diet than low residue diet  If she fails medical management will discuss the role of surgical intervention with laparoscopy versus laparotomy with lysis of adhesions  All questions answered to her understanding satisfaction  Discussed with Dr Campos Garces

## 2019-07-29 NOTE — ASSESSMENT & PLAN NOTE
· Baseline creatinine appears to be between 1 2-1 4 mg/dL   · Creatinine at baseline  · Hold lasix  · Will continue to monitor renal function closely   · Avoid nephrotoxins

## 2019-07-29 NOTE — ASSESSMENT & PLAN NOTE
Wt Readings from Last 3 Encounters:   07/29/19 68 kg (149 lb 14 6 oz)   08/27/18 60 3 kg (133 lb)   08/25/18 65 8 kg (145 lb)     · Small bilateral pleural effusions and pulmonary vascular congestion   · ECHO- LVEF 50%  Grade 1 diastolic dysfunction    · Daily weight   · Monitor IVF/volume status closely   · Will hold lasix due for now

## 2019-07-29 NOTE — PHYSICAL THERAPY NOTE
07/29/19 1305   Pain Assessment   Pain Assessment No/denies pain   Pain Score No Pain   Restrictions/Precautions   Weight Bearing Precautions Per Order No   Other Precautions Fall Risk;Hard of hearing   General   Chart Reviewed Yes   Response to Previous Treatment Patient with no complaints from previous session  Family/Caregiver Present No   Cognition   Overall Cognitive Status Impaired   Arousal/Participation Alert; Cooperative   Attention Within functional limits   Orientation Level Oriented X4   Memory Decreased long term memory;Decreased recall of recent events   Following Commands Follows one step commands with increased time or repetition   Subjective   Subjective "I want to go back to the [place] "   Bed Mobility   Rolling R 3  Moderate assistance   Additional items Assist x 1;Bedrails   Supine to Sit 2  Maximal assistance   Additional items Assist x 1;Bedrails;HOB elevated; Increased time required;Verbal cues;LE management   Sit to Supine 2  Maximal assistance   Additional items Assist x 1;HOB elevated; Bedrails; Increased time required;Verbal cues;LE management   Transfers   Sit to Stand 2  Maximal assistance  (unable to get fully erect)   Additional items Assist x 1; Increased time required;Verbal cues  (bed elevated )   Stand to Sit 4  Minimal assistance   Additional items Assist x 1;Bed elevated; Increased time required; Bedrails   Balance   Static Sitting Fair -   Dynamic Sitting Poor   Endurance Deficit   Endurance Deficit Yes   Activity Tolerance   Activity Tolerance Patient limited by fatigue   Exercises   Quad Sets Supine;20 reps;AROM; Bilateral   Heelslides Supine;20 reps;AROM; Bilateral   Glute Sets Supine;20 reps;AROM; Bilateral   Hip Flexion Supine;20 reps;AROM; Bilateral   Hip Abduction Supine;20 reps;AROM; Bilateral   Hip Adduction Supine;20 reps;AROM; Bilateral   Ankle Pumps Supine;20 reps;AROM; Bilateral   Bridging Supine;5 reps;AROM  (to help with bed mobility, boosting)   Assessment   Prognosis Fair   Problem List Decreased strength;Decreased endurance; Impaired balance;Decreased coordination;Decreased mobility; Decreased cognition; Impaired judgement;Decreased safety awareness; Impaired hearing;Decreased skin integrity   Assessment Pt  found resting in bed this PM but agreeable to PT treatment  Pt  transfered from supine to sit with max Ax1 with HOB elevated and use of R BSR  Pt  able to remain sitting about 2 mins  Attempted sit to stand transfer with bed elevated and max Ax1 with RW   Pt  did not get fully erect  Pt  then assisted back into bed with max Ax1 with LE management and cues pt  to perform bridging to help boost up higher in the bed for comfort  Pt  then performed ther ex BLE as per flow sheet  Pt  denied pain during treatment but did report feeling fatigued after treatment  [facility] in room and made aware of difficulty with mobility  Pt  left with all needs in reach and with fresh water given  SCD's re-applied following treatment  Pt  tolerated treatment but would benifit from further PT treatment to improe functional mobility and functional activity tolerance  Goals   Patient Goals to get better   Plan   Treatment/Interventions Functional transfer training;LE strengthening/ROM; Therapeutic exercise; Endurance training;Patient/family training;Bed mobility;Gait training;Spoke to nursing   Progress Slow progress, decreased activity tolerance   PT Frequency 5x/wk   Recommendation   Recommendation Short-term skilled PT   Equipment Recommended Walker   PT - OK to Discharge No   Ane Diver, PTA

## 2019-07-30 VITALS
HEART RATE: 68 BPM | OXYGEN SATURATION: 99 % | SYSTOLIC BLOOD PRESSURE: 170 MMHG | TEMPERATURE: 97.6 F | WEIGHT: 158.29 LBS | DIASTOLIC BLOOD PRESSURE: 86 MMHG | BODY MASS INDEX: 24.84 KG/M2 | RESPIRATION RATE: 18 BRPM | HEIGHT: 67 IN

## 2019-07-30 PROBLEM — K56.600 SMALL BOWEL OBSTRUCTION, PARTIAL (HCC): Status: RESOLVED | Noted: 2019-07-24 | Resolved: 2019-07-30

## 2019-07-30 LAB
ANION GAP SERPL CALCULATED.3IONS-SCNC: 7 MMOL/L (ref 4–13)
BACTERIA BLD CULT: NORMAL
BACTERIA BLD CULT: NORMAL
BUN SERPL-MCNC: 22 MG/DL (ref 7–25)
CALCIUM SERPL-MCNC: 8.1 MG/DL (ref 8.6–10.5)
CHLORIDE SERPL-SCNC: 110 MMOL/L (ref 98–107)
CO2 SERPL-SCNC: 24 MMOL/L (ref 21–31)
CREAT SERPL-MCNC: 1.14 MG/DL (ref 0.6–1.2)
ERYTHROCYTE [DISTWIDTH] IN BLOOD BY AUTOMATED COUNT: 15.3 % (ref 11.5–14.5)
GFR SERPL CREATININE-BSD FRML MDRD: 43 ML/MIN/1.73SQ M
GLUCOSE SERPL-MCNC: 84 MG/DL (ref 65–99)
HCT VFR BLD AUTO: 29.7 % (ref 42–47)
HGB BLD-MCNC: 10 G/DL (ref 12–16)
MCH RBC QN AUTO: 30.1 PG (ref 26–34)
MCHC RBC AUTO-ENTMCNC: 33.7 G/DL (ref 31–37)
MCV RBC AUTO: 89 FL (ref 81–99)
PLATELET # BLD AUTO: 306 THOUSANDS/UL (ref 149–390)
PMV BLD AUTO: 9.5 FL (ref 8.6–11.7)
POTASSIUM SERPL-SCNC: 3.8 MMOL/L (ref 3.5–5.5)
RBC # BLD AUTO: 3.32 MILLION/UL (ref 3.9–5.2)
SODIUM SERPL-SCNC: 141 MMOL/L (ref 134–143)
WBC # BLD AUTO: 7.5 THOUSAND/UL (ref 4.8–10.8)

## 2019-07-30 PROCEDURE — 80048 BASIC METABOLIC PNL TOTAL CA: CPT | Performed by: INTERNAL MEDICINE

## 2019-07-30 PROCEDURE — 85027 COMPLETE CBC AUTOMATED: CPT | Performed by: INTERNAL MEDICINE

## 2019-07-30 PROCEDURE — 97110 THERAPEUTIC EXERCISES: CPT

## 2019-07-30 PROCEDURE — 99232 SBSQ HOSP IP/OBS MODERATE 35: CPT | Performed by: INTERNAL MEDICINE

## 2019-07-30 PROCEDURE — 99239 HOSP IP/OBS DSCHRG MGMT >30: CPT | Performed by: INTERNAL MEDICINE

## 2019-07-30 PROCEDURE — C9113 INJ PANTOPRAZOLE SODIUM, VIA: HCPCS | Performed by: NURSE PRACTITIONER

## 2019-07-30 RX ADMIN — PANTOPRAZOLE SODIUM 40 MG: 40 INJECTION, POWDER, LYOPHILIZED, FOR SOLUTION INTRAVENOUS at 09:20

## 2019-07-30 RX ADMIN — HEPARIN SODIUM 5000 UNITS: 5000 INJECTION, SOLUTION INTRAVENOUS; SUBCUTANEOUS at 05:26

## 2019-07-30 RX ADMIN — SODIUM CHLORIDE 60 ML/HR: 9 INJECTION, SOLUTION INTRAVENOUS at 02:46

## 2019-07-30 RX ADMIN — METOPROLOL TARTRATE 25 MG: 25 TABLET, FILM COATED ORAL at 09:20

## 2019-07-30 NOTE — ASSESSMENT & PLAN NOTE
· Baseline creatinine appears to be between 1 2-1 4 mg/dL   · Creatinine at baseline  · Resume Lasix on discharge

## 2019-07-30 NOTE — PLAN OF CARE
Problem: Potential for Falls  Goal: Patient will remain free of falls  Description  INTERVENTIONS:  - Assess patient frequently for physical needs  -  Identify cognitive and physical deficits and behaviors that affect risk of falls  -  [place] fall precautions as indicated by assessment   - Educate patient/family on patient safety including physical limitations  - Instruct patient to call for assistance with activity based on assessment  - Modify environment to reduce risk of injury  - Consider OT/PT consult to assist with strengthening/mobility  Outcome: Progressing     Problem: Prexisting or High Potential for Compromised Skin Integrity  Goal: Skin integrity is maintained or improved  Description  INTERVENTIONS:  - Identify patients at risk for skin breakdown  - Assess and monitor skin integrity  - Assess and monitor nutrition and hydration status  - Monitor labs (i e  albumin)  - Assess for incontinence   - Turn and reposition patient  - Assist with mobility/ambulation  - Relieve pressure over bony prominences  - Avoid friction and shearing  - Provide appropriate hygiene as needed including keeping skin clean and dry  - Evaluate need for skin moisturizer/barrier cream  - Collaborate with interdisciplinary team (i e  Nutrition, Rehabilitation, etc )   - Patient/family teaching  Outcome: Progressing     Problem: Nutrition/Hydration-ADULT  Goal: Nutrient/Hydration intake appropriate for improving, restoring or maintaining nutritional needs  Description  Monitor and assess patient's nutrition/hydration status for malnutrition (ex- brittle hair, bruises, dry skin, pale skin and conjunctiva, muscle wasting, smooth red tongue, and disorientation)  Collaborate with interdisciplinary team and initiate plan and interventions as ordered  Monitor patient's weight and dietary intake as ordered or per policy  Utilize nutrition screening tool and intervene per policy   Determine patient's food preferences and provide high-protein, high-caloric foods as appropriate  INTERVENTIONS:  - Monitor oral intake, urinary output, labs, and treatment plans  - Assess nutrition and hydration status and recommend course of action  - Evaluate amount of meals eaten  - Assist patient with eating if necessary   - Allow adequate time for meals  - Recommend/ encourage appropriate diets, oral nutritional supplements, and vitamin/mineral supplements  - Order, calculate, and assess calorie counts as needed  - Recommend, monitor, and adjust tube feedings and TPN/PPN based on assessed needs  - Assess need for intravenous fluids  - Provide specific nutrition/hydration education as appropriate  - Include patient/family/caregiver in decisions related to nutrition      7-29-19      she is on a surgical full liquid  RDN visited on rounds this am and she was taking some of her tea and oatmeal  She stated that she did feel better  Her weight was 144 on 7-28 then 149 on 7-29 with edema noted  Her skin is intact  Her glucose was 89 WNL 7-29  RDN to evaluate her labs when available  She is on zofran and lopressor  RDN to reassess her nutrition needs with her and her team PRN at high risk  RDN to recommend to increase to solids as tolerated       Outcome: Progressing     Problem: PAIN - ADULT  Goal: Verbalizes/displays adequate comfort level or baseline comfort level  Description  Interventions:  - Encourage patient to monitor pain and request assistance  - Assess pain using appropriate pain scale  - Administer analgesics based on type and severity of pain and evaluate response  - Implement non-pharmacological measures as appropriate and evaluate response  - Consider cultural and social influences on pain and pain management  - Notify physician/advanced practitioner if interventions unsuccessful or patient reports new pain  Outcome: Progressing     Problem: INFECTION - ADULT  Goal: Absence or prevention of progression during hospitalization  Description  INTERVENTIONS:  - Assess and monitor for signs and symptoms of infection  - Monitor lab/diagnostic results  - Monitor all insertion sites, i e  indwelling lines, tubes, and drains  - Administer medications as ordered  - Instruct and encourage patient and family to use good hand hygiene technique  - Identify and instruct in appropriate isolation precautions for identified infection/condition   Outcome: Progressing  Goal: Absence of fever/infection during neutropenic period  Description  INTERVENTIONS:  - Monitor WBC     Outcome: Progressing     Problem: SAFETY ADULT  Goal: Patient will remain free of falls  Description  INTERVENTIONS:  - Assess patient frequently for physical needs  -  Identify cognitive and physical deficits and behaviors that affect risk of falls    -  [place] fall precautions as indicated by assessment   - Educate patient/family on patient safety including physical limitations  - Instruct patient to call for assistance with activity based on assessment  - Modify environment to reduce risk of injury  - Consider OT/PT consult to assist with strengthening/mobility  Outcome: Progressing  Goal: Maintain or return to baseline ADL function  Description  INTERVENTIONS:  -  Assess patient's ability to carry out ADLs; assess patient's baseline for ADL function and identify physical deficits which impact ability to perform ADLs (bathing, care of mouth/teeth, toileting, grooming, dressing, etc )  - Assess/evaluate cause of self-care deficits   - Assess range of motion  - Assess patient's mobility; develop plan if impaired  - Assess patient's need for assistive devices and provide as appropriate  - Encourage maximum independence but intervene and supervise when necessary  ¯ Involve family in performance of ADLs  ¯ Assess for home care needs following discharge   ¯ Request OT consult to assist with ADL evaluation and planning for discharge  ¯ Provide patient education as appropriate  Outcome: Progressing  Goal: Maintain or return mobility status to optimal level  Description  INTERVENTIONS:  - Assess patient's baseline mobility status (ambulation, transfers, stairs, etc )    - Identify cognitive and physical deficits and behaviors that affect mobility  - Identify mobility aids required to assist with transfers and/or ambulation (gait belt, sit-to-stand, lift, walker, cane, etc )  - [place] fall precautions as indicated by assessment  - Record patient progress and toleration of activity level on Mobility SBAR; progress patient to next Phase/Stage  - Instruct patient to call for assistance with activity based on assessment  - Request Rehabilitation consult to assist with strengthening/weightbearing, etc   Outcome: Progressing     Problem: DISCHARGE PLANNING  Goal: Discharge to home or other facility with appropriate resources  Description  INTERVENTIONS:  - Identify barriers to discharge w/patient and caregiver  - Arrange for needed discharge resources and transportation as appropriate  - Identify discharge learning needs (meds, wound care, etc )  - Arrange for interpretive services to assist at discharge as needed  - Refer to Case Management Department for coordinating discharge planning if the patient needs post-hospital services based on physician/advanced practitioner order or complex needs related to functional status, cognitive ability, or social support system  Outcome: Progressing     Problem: Knowledge Deficit  Goal: Patient/family/caregiver demonstrates understanding of disease process, treatment plan, medications, and discharge instructions  Description  Complete learning assessment and assess knowledge base    Interventions:  - Provide teaching at level of understanding  - Provide teaching via preferred learning methods  Outcome: Progressing     Problem: GASTROINTESTINAL - ADULT  Goal: Minimal or absence of nausea and/or vomiting  Description  INTERVENTIONS:  - Administer IV fluids as ordered to ensure adequate hydration  - Maintain NPO status until nausea and vomiting are resolved  - Nasogastric tube as ordered  - Administer ordered antiemetic medications as needed  - Provide nonpharmacologic comfort measures as appropriate  - Advance diet as tolerated, if ordered  - Nutrition services referral to assist patient with adequate nutrition and appropriate food choices  Outcome: Progressing  Goal: Maintains adequate nutritional intake  Description  INTERVENTIONS:  - Monitor percentage of each meal consumed  - Identify factors contributing to decreased intake, treat as appropriate  - Assist with meals as needed  - Monitor I&O, WT and lab values  - Obtain nutrition services referral as needed  Outcome: Progressing     Problem: METABOLIC, FLUID AND ELECTROLYTES - ADULT  Goal: Electrolytes maintained within normal limits  Description  INTERVENTIONS:  - Monitor labs and assess patient for signs and symptoms of electrolyte imbalances  - Administer electrolyte replacement as ordered  - Monitor response to electrolyte replacements, including repeat lab results as appropriate  - Instruct patient on fluid and nutrition as appropriate  Outcome: Progressing  Goal: Fluid balance maintained  Description  INTERVENTIONS:  - Monitor labs and assess for signs and symptoms of volume excess or deficit  - Monitor I/O and WT  - Instruct patient on fluid and nutrition as appropriate  Outcome: Progressing     Problem: SKIN/TISSUE INTEGRITY - ADULT  Goal: Skin integrity remains intact  Description  INTERVENTIONS  - Identify patients at risk for skin breakdown  - Assess and monitor skin integrity  - Assess and monitor nutrition and hydration status  - Monitor labs (i e  albumin)  - Assess for incontinence   - Turn and reposition patient  - Assist with mobility/ambulation  - Relieve pressure over bony prominences  - Avoid friction and shearing  - Provide appropriate hygiene as needed including keeping skin clean and dry  - Evaluate need for skin moisturizer/barrier cream  - Collaborate with interdisciplinary team (i e  Nutrition, Rehabilitation, etc )   - Patient/family teaching  Outcome: Progressing

## 2019-07-30 NOTE — PLAN OF CARE
Problem: Potential for Falls  Goal: Patient will remain free of falls  Description  INTERVENTIONS:  - Assess patient frequently for physical needs  -  Identify cognitive and physical deficits and behaviors that affect risk of falls  -  [place] fall precautions as indicated by assessment   - Educate patient/family on patient safety including physical limitations  - Instruct patient to call for assistance with activity based on assessment  - Modify environment to reduce risk of injury  - Consider OT/PT consult to assist with strengthening/mobility  7/30/2019 1449 by Lucy Guajardo RN  Outcome: Adequate for Discharge  7/30/2019 1407 by Lucy Guajardo RN  Outcome: Progressing     Problem: Prexisting or High Potential for Compromised Skin Integrity  Goal: Skin integrity is maintained or improved  Description  INTERVENTIONS:  - Identify patients at risk for skin breakdown  - Assess and monitor skin integrity  - Assess and monitor nutrition and hydration status  - Monitor labs (i e  albumin)  - Assess for incontinence   - Turn and reposition patient  - Assist with mobility/ambulation  - Relieve pressure over bony prominences  - Avoid friction and shearing  - Provide appropriate hygiene as needed including keeping skin clean and dry  - Evaluate need for skin moisturizer/barrier cream  - Collaborate with interdisciplinary team (i e  Nutrition, Rehabilitation, etc )   - Patient/family teaching  7/30/2019 1449 by Lucy Guajardo RN  Outcome: Adequate for Discharge  7/30/2019 1407 by Lucy Guajardo RN  Outcome: Progressing     Problem: Nutrition/Hydration-ADULT  Goal: Nutrient/Hydration intake appropriate for improving, restoring or maintaining nutritional needs  Description  Monitor and assess patient's nutrition/hydration status for malnutrition (ex- brittle hair, bruises, dry skin, pale skin and conjunctiva, muscle wasting, smooth red tongue, and disorientation)   Collaborate with interdisciplinary team and initiate plan and interventions as ordered  Monitor patient's weight and dietary intake as ordered or per policy  Utilize nutrition screening tool and intervene per policy  Determine patient's food preferences and provide high-protein, high-caloric foods as appropriate  INTERVENTIONS:  - Monitor oral intake, urinary output, labs, and treatment plans  - Assess nutrition and hydration status and recommend course of action  - Evaluate amount of meals eaten  - Assist patient with eating if necessary   - Allow adequate time for meals  - Recommend/ encourage appropriate diets, oral nutritional supplements, and vitamin/mineral supplements  - Order, calculate, and assess calorie counts as needed  - Recommend, monitor, and adjust tube feedings and TPN/PPN based on assessed needs  - Assess need for intravenous fluids  - Provide specific nutrition/hydration education as appropriate  - Include patient/family/caregiver in decisions related to nutrition      7-29-19      she is on a surgical full liquid  RDN visited on rounds this am and she was taking some of her tea and oatmeal  She stated that she did feel better  Her weight was 144 on 7-28 then 149 on 7-29 with edema noted  Her skin is intact  Her glucose was 89 WNL 7-29  RDN to evaluate her labs when available  She is on zofran and lopressor  RDN to reassess her nutrition needs with her and her team PRN at high risk  RDN to recommend to increase to solids as tolerated       7/30/2019 1449 by Cleopatra Mccarthy RN  Outcome: Adequate for Discharge  7/30/2019 1407 by Cleopatra Mccarthy RN  Outcome: Progressing     Problem: PAIN - ADULT  Goal: Verbalizes/displays adequate comfort level or baseline comfort level  Description  Interventions:  - Encourage patient to monitor pain and request assistance  - Assess pain using appropriate pain scale  - Administer analgesics based on type and severity of pain and evaluate response  - Implement non-pharmacological measures as appropriate and evaluate response  - Consider cultural and social influences on pain and pain management  - Notify physician/advanced practitioner if interventions unsuccessful or patient reports new pain  7/30/2019 1449 by Eun Martinez RN  Outcome: Adequate for Discharge  7/30/2019 1407 by Eun Martinez RN  Outcome: Progressing     Problem: INFECTION - ADULT  Goal: Absence or prevention of progression during hospitalization  Description  INTERVENTIONS:  - Assess and monitor for signs and symptoms of infection  - Monitor lab/diagnostic results  - Monitor all insertion sites, i e  indwelling lines, tubes, and drains  - Administer medications as ordered  - Instruct and encourage patient and family to use good hand hygiene technique  - Identify and instruct in appropriate isolation precautions for identified infection/condition   7/30/2019 1449 by Eun Martinez RN  Outcome: Adequate for Discharge  7/30/2019 1407 by Enu Martinez RN  Outcome: Progressing  Goal: Absence of fever/infection during neutropenic period  Description  INTERVENTIONS:  - Monitor WBC     7/30/2019 1449 by Eun Martinez RN  Outcome: Adequate for Discharge  7/30/2019 1407 by Eun Martinez RN  Outcome: Progressing     Problem: SAFETY ADULT  Goal: Patient will remain free of falls  Description  INTERVENTIONS:  - Assess patient frequently for physical needs  -  Identify cognitive and physical deficits and behaviors that affect risk of falls    -  [place] fall precautions as indicated by assessment   - Educate patient/family on patient safety including physical limitations  - Instruct patient to call for assistance with activity based on assessment  - Modify environment to reduce risk of injury  - Consider OT/PT consult to assist with strengthening/mobility  7/30/2019 1449 by Eun Martinez RN  Outcome: Adequate for Discharge  7/30/2019 1407 by Eun Martinez RN  Outcome: Progressing  Goal: Maintain or return to baseline ADL function  Description  INTERVENTIONS:  -  Assess patient's ability to carry out ADLs; assess patient's baseline for ADL function and identify physical deficits which impact ability to perform ADLs (bathing, care of mouth/teeth, toileting, grooming, dressing, etc )  - Assess/evaluate cause of self-care deficits   - Assess range of motion  - Assess patient's mobility; develop plan if impaired  - Assess patient's need for assistive devices and provide as appropriate  - Encourage maximum independence but intervene and supervise when necessary  ¯ Involve family in performance of ADLs  ¯ Assess for home care needs following discharge   ¯ Request OT consult to assist with ADL evaluation and planning for discharge  ¯ Provide patient education as appropriate  7/30/2019 1449 by Cindi Flynn RN  Outcome: Adequate for Discharge  7/30/2019 1407 by Cindi Flynn RN  Outcome: Progressing  Goal: Maintain or return mobility status to optimal level  Description  INTERVENTIONS:  - Assess patient's baseline mobility status (ambulation, transfers, stairs, etc )    - Identify cognitive and physical deficits and behaviors that affect mobility  - Identify mobility aids required to assist with transfers and/or ambulation (gait belt, sit-to-stand, lift, walker, cane, etc )  - [place] fall precautions as indicated by assessment  - Record patient progress and toleration of activity level on Mobility SBAR; progress patient to next Phase/Stage  - Instruct patient to call for assistance with activity based on assessment  - Request Rehabilitation consult to assist with strengthening/weightbearing, etc   7/30/2019 1449 by iCndi Flynn RN  Outcome: Adequate for Discharge  7/30/2019 1407 by Cindi Flynn RN  Outcome: Progressing     Problem: DISCHARGE PLANNING  Goal: Discharge to home or other facility with appropriate resources  Description  INTERVENTIONS:  - Identify barriers to discharge w/patient and caregiver  - Arrange for needed discharge resources and transportation as appropriate  - Identify discharge learning needs (meds, wound care, etc )  - Arrange for interpretive services to assist at discharge as needed  - Refer to Case Management Department for coordinating discharge planning if the patient needs post-hospital services based on physician/advanced practitioner order or complex needs related to functional status, cognitive ability, or social support system  7/30/2019 1449 by July Munoz RN  Outcome: Adequate for Discharge  7/30/2019 1407 by July Munoz RN  Outcome: Progressing     Problem: Knowledge Deficit  Goal: Patient/family/caregiver demonstrates understanding of disease process, treatment plan, medications, and discharge instructions  Description  Complete learning assessment and assess knowledge base    Interventions:  - Provide teaching at level of understanding  - Provide teaching via preferred learning methods  7/30/2019 1449 by July Mnuoz RN  Outcome: Adequate for Discharge  7/30/2019 1407 by July Munoz RN  Outcome: Progressing     Problem: GASTROINTESTINAL - ADULT  Goal: Minimal or absence of nausea and/or vomiting  Description  INTERVENTIONS:  - Administer IV fluids as ordered to ensure adequate hydration  - Maintain NPO status until nausea and vomiting are resolved  - Nasogastric tube as ordered  - Administer ordered antiemetic medications as needed  - Provide nonpharmacologic comfort measures as appropriate  - Advance diet as tolerated, if ordered  - Nutrition services referral to assist patient with adequate nutrition and appropriate food choices  7/30/2019 1449 by July Munoz RN  Outcome: Adequate for Discharge  7/30/2019 1407 by July Munoz RN  Outcome: Progressing  Goal: Maintains adequate nutritional intake  Description  INTERVENTIONS:  - Monitor percentage of each meal consumed  - Identify factors contributing to decreased intake, treat as appropriate  - Assist with meals as needed  - Monitor I&O, WT and lab values  - Obtain nutrition services referral as needed  7/30/2019 1449 by Pamela Morales RN  Outcome: Adequate for Discharge  7/30/2019 1407 by Pamela Morales RN  Outcome: Progressing     Problem: METABOLIC, FLUID AND ELECTROLYTES - ADULT  Goal: Electrolytes maintained within normal limits  Description  INTERVENTIONS:  - Monitor labs and assess patient for signs and symptoms of electrolyte imbalances  - Administer electrolyte replacement as ordered  - Monitor response to electrolyte replacements, including repeat lab results as appropriate  - Instruct patient on fluid and nutrition as appropriate  7/30/2019 1449 by Pamela Morales RN  Outcome: Adequate for Discharge  7/30/2019 1407 by Pamela Morales RN  Outcome: Progressing  Goal: Fluid balance maintained  Description  INTERVENTIONS:  - Monitor labs and assess for signs and symptoms of volume excess or deficit  - Monitor I/O and WT  - Instruct patient on fluid and nutrition as appropriate  7/30/2019 1449 by Pamela Morales RN  Outcome: Adequate for Discharge  7/30/2019 1407 by Pamela Morales RN  Outcome: Progressing     Problem: SKIN/TISSUE INTEGRITY - ADULT  Goal: Skin integrity remains intact  Description  INTERVENTIONS  - Identify patients at risk for skin breakdown  - Assess and monitor skin integrity  - Assess and monitor nutrition and hydration status  - Monitor labs (i e  albumin)  - Assess for incontinence   - Turn and reposition patient  - Assist with mobility/ambulation  - Relieve pressure over bony prominences  - Avoid friction and shearing  - Provide appropriate hygiene as needed including keeping skin clean and dry  - Evaluate need for skin moisturizer/barrier cream  - Collaborate with interdisciplinary team (i e  Nutrition, Rehabilitation, etc )   - Patient/family teaching  7/30/2019 1449 by Pamela Morales RN  Outcome: Adequate for Discharge  7/30/2019 1407 by Pamela Morales RN  Outcome: Progressing

## 2019-07-30 NOTE — PHYSICAL THERAPY NOTE
07/30/19 0837   Pain Assessment   Pain Assessment 0-10   Pain Score No Pain   Restrictions/Precautions   Weight Bearing Precautions Per Order No   Other Precautions Fall Risk;Hard of hearing   General   Chart Reviewed Yes   Family/Caregiver Present No   Cognition   Overall Cognitive Status Impaired   Arousal/Participation Alert; Cooperative   Attention Within functional limits   Orientation Level Oriented X4   Memory Decreased long term memory;Decreased recall of recent events   Following Commands Follows one step commands with increased time or repetition   Comments pt agreed to PT session-ex in bed, she deferred transfers   Subjective   Subjective "I'll do some ex in the bed but I don't want to get up "   Bed Mobility   Supine to Sit   (pt deferred)   Transfers   Sit to Stand   (pt  deferred)   Endurance Deficit   Endurance Deficit Yes   Endurance Deficit Description fatigues with min activity   Activity Tolerance   Activity Tolerance Patient limited by fatigue   Exercises   Quad Sets Supine;20 reps;AROM; Bilateral   Heelslides Supine;20 reps;AROM; Bilateral   Glute Sets Supine;20 reps;AROM; Bilateral   Hip Flexion Supine;20 reps;AROM; Bilateral   Hip Abduction Supine;20 reps;AROM; Bilateral   Hip Adduction Supine;20 reps;AROM; Bilateral  (pillow squeezes)   Knee AROM Short Arc Quad Supine;20 reps;AROM; Bilateral   Ankle Pumps Supine;20 reps;AROM; Bilateral   Bridging   (pt deferred)   Assessment   Prognosis Fair   Problem List Decreased strength;Decreased endurance; Impaired balance;Decreased mobility; Decreased cognition; Impaired judgement;Decreased safety awareness; Impaired hearing;Decreased skin integrity   Assessment Pt seen for PT treatment session this date with interventions consisting of Therapeutic exercise consisting of: AROM 2 sets of 10 reps B LE in supine position  Pt agreeable to PT treatment session upon arrival, pt found supine in bed w/ HOB elevated, in no apparent distress   In comparison to previous session, pt with no improvements as evidenced by pt deferring transfers today  Post session: all needs in reach, pt in bed, SCDs active  Continue to recommend STR at time of d/c in order to maximize pt's functional independence and safety w/ mobility  Pt continues to be functioning below baseline level, and remains limited 2* factors listed above and including decreased strength, endurance & safe functional mobility  PT will continue to see pt while here in order to address the deficits listed above and provide interventions consistent w/ POC in effort to achieve STGs  Goals   Patient Goals to go back to the [facility]   Treatment Day 3   Plan   Treatment/Interventions Functional transfer training;LE strengthening/ROM; Therapeutic exercise; Endurance training;Patient/family training;Equipment eval/education; Bed mobility;Gait training   Progress No functional improvements   PT Frequency 5x/wk   Recommendation   Recommendation Short-term skilled PT   Equipment Recommended Walker   PT - OK to Discharge Yes  (when med cleared to STR)   Ellie Batemanr, PTA

## 2019-07-30 NOTE — ASSESSMENT & PLAN NOTE
Wt Readings from Last 3 Encounters:   07/30/19 71 8 kg (158 lb 4 6 oz)   08/27/18 60 3 kg (133 lb)   08/25/18 65 8 kg (145 lb)     · Small bilateral pleural effusions and pulmonary vascular congestion   · ECHO- LVEF 50%  Grade 1 diastolic dysfunction    · Daily weight   · Monitor IVF/volume status closely

## 2019-07-30 NOTE — ASSESSMENT & PLAN NOTE
· No prior history    · Appreciate Cardiology input  · Continue metoprolol  · Patient was not felt to be an anticoagulation candidate as she was high fall risk as per PT and also patient preference  · Follow up with Cardiology as an outpatient

## 2019-07-30 NOTE — PROGRESS NOTES
Patient remains asymptomatic and stable surgically for transfer back to the [facility]

## 2019-07-30 NOTE — PROGRESS NOTES
Cardiology Progress Note - Beth Mcknight 80 y o  female MRN: [id number]    Unit/Bed#: -01 Encounter: [id number]      Assessment:  Principal Problem:    Small bowel obstruction, partial (HCC)  Active Problems:    Essential hypertension    CKD (chronic kidney disease) stage 3, GFR 30-59 ml/min (HCC)    Hypothyroidism due to acquired atrophy of thyroid    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, stage 2    Mild protein-calorie malnutrition (HCC)    Chronic combined systolic and diastolic congestive heart failure (HCC)    Leukemoid reaction    Cognitive impairment    Diarrhea    Atrial fibrillation with rapid ventricular response ([facility] [address] )    1  New-onset atrial fibrillation   2  Small bowel obstruction  3  CKD  4  Hypertension  5  Cognitive impairement      Plan  · Afib in setting of acute SBO  · Echo done - personally reviewed, LVEF 50%, Grade 1 DD  · On metoprolol 25 bid  · Converted back to NSR yesterday, and has remained in NSR  · Anticoagulation has been deferred due to patient frailty and fall risk  · She did not do well with PT/OT  · Discussd again with patient regarding risks-benefits of anticoagulation, and patient wants to avoid anticoagulation, which I believe is a reasonable choice for her  · Discussed with primary team  · Okay for discharge from cardiac standpoint      Subjective:   No significant events overnight  She had a bowel movement, and tolerated diet  No pain or palpitations  Review of Systems  No c/o chest pain, No c/o palpitations, No c/o shortness of breath, No c/o dizziness or light-headedness, No c/o abdominal pain, no c/o nausea/vomitting  All other systems negative    Telemetry Review: Remains in NSR with PACs    Objective:   Vitals: Blood pressure 157/70, pulse 74, temperature 97 7 °F (36 5 °C), temperature source Temporal, resp  rate 18, height 5' 7" (1 702 m), weight 71 8 kg (158 lb 4 6 oz), SpO2 94 %  , Body mass index is 24 79 kg/m²  , Orthostatic Blood Pressures      Most Recent Value   Blood Pressure  157/70 filed at 07/30/2019 0710   Patient Position - Orthostatic VS  Sitting filed at 07/30/2019 9826         Systolic (85VGT), VWK:852 , Min:117 , BZK:786     Diastolic (27EDI), CZO:18, Min:56, Max:70    Wt Readings from Last 5 Encounters:   07/30/19 71 8 kg (158 lb 4 6 oz)   08/27/18 60 3 kg (133 lb)   08/25/18 65 8 kg (145 lb)   08/25/18 65 8 kg (145 lb 1 oz)   08/21/18 65 8 kg (145 lb)     I/O       07/28 0701 - 07/29 0700 07/29 0701 - 07/30 0700    P  O  0 360    NG/GT      Total Intake(mL/kg) 0 (0) 360 (5 3)    Net 0 +360          Unmeasured Urine Occurrence 1 x 1 x    Unmeasured Stool Occurrence 1 x               Physical Exam    Constitutional:  Alert, cooperative, no acute distress   HEENT:    Normocephalic, atraumatic   Neck:  No JVD, No stridor   Lungs:  Clear to auscultation bilaterally, respirations unlabored    Chest Wall:  No tenderness or deformity    Heart::  Regular rate, Regular rhythm, S1 and S2 normal, no murmur, rub or gallop    Abdomen:  Soft, non-tender, non-distended   Neurological:  Awake, alert, oriented x3   Extremities:  No pedal edema or calf tenderness         Laboratory Results:  Results from last 7 days   Lab Units 07/25/19  0028 07/24/19  2219 07/24/19  1838   TROPONIN I ng/mL 0 04* 0 03 0 03       CBC with diff:   Results from last 7 days   Lab Units 07/30/19  0519 07/29/19  0522 07/28/19  0507 07/27/19  0507 07/26/19  0532 07/25/19  0526 07/24/19  1838   WBC Thousand/uL 7 50 7 40 9 80 8 90 9 30 9 00 13 90*   HEMOGLOBIN g/dL 10 0* 9 1* 10 2* 11 1* 11 1* 11 0* 12 3   HEMATOCRIT % 29 7* 27 4* 30 2* 32 8* 33 3* 32 8* 37 2*   MCV fL 89 89 87 89 90 89 88   PLATELETS Thousands/uL 306 303 314 358 345 332 360   MCH pg 30 1 29 6 29 4 30 0 29 9 29 7 29 0   MCHC g/dL 33 7 33 3 33 6 33 9 33 3 33 5 33 1   RDW % 15 3* 15 5* 15 1* 15 0* 14 9* 14 7* 15 2*   MPV fL 9 5 9 1 8 4* 9 4 9 5 9 2 7 7*         CMP:  Results from last 7 days   Lab Units 07/30/19  0518 07/29/19  0522 07/28/19  0507 07/27/19  0507 07/26/19  0532 07/25/19  0526 07/24/19  1838   POTASSIUM mmol/L 3 8 3 9 3 4* 2 8* 3 6 3 7 4 1   CHLORIDE mmol/L 110* 111* 106 107 106 105 99   CO2 mmol/L 24 22 26 26 28 27 31   BUN mg/dL 22 26* 28* 30* 33* 35* 40*   CREATININE mg/dL 1 14 1 21* 1 31* 1 16 1 19 1 29* 1 48*   CALCIUM mg/dL 8 1* 7 6* 8 1* 8 4* 8 3* 7 9* 8 9   AST U/L  --  28  --   --   --  23 27   ALT U/L  --  20  --   --   --  40 55*   ALK PHOS U/L  --  55  --   --   --  68 73   EGFR ml/min/1 73sq m 43 40 36 42 41 37 31         BMP:  Results from last 7 days   Lab Units 07/30/19  0518 07/29/19  0522 07/28/19  0507 07/27/19  0507 07/26/19  0532 07/25/19  0526 07/24/19  1838   POTASSIUM mmol/L 3 8 3 9 3 4* 2 8* 3 6 3 7 4 1   CHLORIDE mmol/L 110* 111* 106 107 106 105 99   CO2 mmol/L 24 22 26 26 28 27 31   BUN mg/dL 22 26* 28* 30* 33* 35* 40*   CREATININE mg/dL 1 14 1 21* 1 31* 1 16 1 19 1 29* 1 48*   CALCIUM mg/dL 8 1* 7 6* 8 1* 8 4* 8 3* 7 9* 8 9       BNP: No results for input(s): BNP in the last 72 hours      Magnesium:   Results from last 7 days   Lab Units 07/29/19  0522 07/28/19  0507 07/25/19  0526   MAGNESIUM mg/dL 2 1 2 1 2 3       Coags:   Results from last 7 days   Lab Units 07/29/19  0522 07/24/19  1838   PTT seconds  --  30   INR  1 06 1 06       TSH:        Hemoglobin A1C       Lipid Profile:       Meds/Allergies   all current active meds have been reviewed and current meds:   Current Facility-Administered Medications   Medication Dose Route Frequency    heparin (porcine) subcutaneous injection 5,000 Units  5,000 Units Subcutaneous Q8H [address]    hydrALAZINE (APRESOLINE) injection 10 mg  10 mg Intravenous Q6H PRN    metoprolol tartrate (LOPRESSOR) tablet 25 mg  25 mg Oral Q12H [address]    ondansetron (ZOFRAN) injection 4 mg  4 mg Intravenous Q6H PRN    pantoprazole (PROTONIX) injection 40 mg  40 mg Intravenous Q24H [facility]    phenol (CHLORASEPTIC) 1 4 % mucosal liquid 1 spray  1 spray Mouth/Throat Q2H PRN    sodium chloride 0 9 % infusion  60 mL/hr Intravenous Continuous     Medications Prior to Admission   Medication    aspirin (ECOTRIN LOW STRENGTH) 81 mg EC tablet    furosemide (LASIX) 40 mg tablet    furosemide (LASIX) 40 mg tablet    levothyroxine 75 mcg tablet    lisinopril (ZESTRIL) 5 mg tablet    nitrofurantoin (MACROBID) 100 mg capsule       sodium chloride 60 mL/hr Last Rate: 60 mL/hr (19 0246)         Cardiac testing:   Results for orders placed during the hospital encounter of 19   Echo complete with contrast if indicated    Narrative [facility] [employer] [address]    Transthoracic Echocardiogram  2D, M-mode, Doppler, and Color Doppler    Study date:  2019    Patient: Emperatriz Mesa  MR number: [id number]  Account number: [de-identified]  : 24-May-1931  Age: 80 years  Gender: Female  Status: Inpatient  [facility]   Height: 67 in  Weight: 144 8 lb  BP: 139/ 65 mmHg    Indications: Cardiomyopathy  Diagnoses: I42 9 - Cardiomyopathy, unspecified    Sonographer:  Brittany Estevez RDCS  Referring Physician:  Kenia Quesada MD  Group:  [id number]  Interpreting Physician:  Franko Carbone MD    SUMMARY    LEFT VENTRICLE:  Systolic function was at the lower limits of normal  Ejection fraction was estimated to be 50 %  There was akinesis of the entire inferior and basal-mid inferolateral wall(s)  There was mild concentric hypertrophy  Doppler parameters were consistent with abnormal left ventricular relaxation (grade 1 diastolic dysfunction)  RIGHT VENTRICLE:  The size was normal   Systolic function was normal     MITRAL VALVE:  There was moderate annular calcification  There was trace regurgitation  AORTIC VALVE:  There was mild regurgitation  TRICUSPID VALVE:  There was trace regurgitation      HISTORY: PRIOR HISTORY: Small bowel obstruction, CKD Previous (remote) myocardial infarction  Congestive heart failure  Cardiomyopathy  Risk factors: hypertension  Remote transient ischemic attack  PROCEDURE: The study was performed in the [facility]  This was a routine study  The transthoracic approach was used  The study included complete 2D imaging, M-mode, complete spectral Doppler, and color Doppler  The  heart rate was 67 bpm, at the start of the study  Images were obtained from the parasternal, apical, and suprasternal notch acoustic windows  Images were not obtained from the subcostal acoustic windows  Echocardiographic views were  limited due to restricted patient mobility, poor acoustic window availability, and lung interference  This was a technically difficult study  LEFT VENTRICLE: Size was normal  Systolic function was at the lower limits of normal  Ejection fraction was estimated to be 50 %  There was akinesis of the entire inferior and basal-mid inferolateral wall(s)  Wall thickness was mildly  increased  There was mild concentric hypertrophy  DOPPLER: Doppler parameters were consistent with abnormal left ventricular relaxation (grade 1 diastolic dysfunction)  RIGHT VENTRICLE: The size was normal  Systolic function was normal  Wall thickness was normal     LEFT ATRIUM: Size was normal     RIGHT ATRIUM: Size was normal     MITRAL VALVE: There was moderate annular calcification  Valve structure was normal  There was normal leaflet separation  DOPPLER: The transmitral velocity was within the normal range  There was no evidence for stenosis  There was trace  regurgitation  AORTIC VALVE: The valve was trileaflet  Leaflets exhibited mildly increased thickness, moderate calcification, and normal cuspal separation  DOPPLER: Transaortic velocity was within the normal range  There was no evidence for stenosis  There was mild regurgitation      TRICUSPID VALVE: The valve structure was normal  There was normal leaflet separation  DOPPLER: The transtricuspid velocity was within the normal range  There was no evidence for stenosis  There was trace regurgitation  Estimated peak PA  pressure was 40 mmHg  PULMONIC VALVE: Leaflets exhibited normal thickness, no calcification, and normal cuspal separation  DOPPLER: The transpulmonic velocity was within the normal range  There was no significant regurgitation  PERICARDIUM: There was no pericardial effusion  The pericardium was normal in appearance  AORTA: The root exhibited normal size  SYSTEMIC VEINS: IVC: The inferior vena cava was normal in size  SYSTEM MEASUREMENT TABLES    2D  %FS: 27 69 %  AV Diam: 2 94 cm  EDV(Teich): 74 31 ml  EF(Teich): 54 19 %  ESV(Teich): 34 04 ml  IVSd: 1 2 cm  LA Area: 16 28 cm2  LA Diam: 3 97 cm  LVEDV MOD A4C: 138 72 ml  LVEF MOD A4C: 53 34 %  LVESV MOD A4C: 64 73 ml  LVIDd: 4 1 cm  LVIDs: 2 97 cm  LVLd A4C: 8 cm  LVLs A4C: 6 77 cm  LVOT Diam: 2 cm  LVPWd: 1 18 cm  RA Area: 6 05 cm2  RV Diam : 2 89 cm  SI(Teich): 22 75 ml/m2  SV MOD A4C: 73 99 ml  SV(Cube): 42 93 ml  SV(Teich): 40 27 ml    CW  AR Dec Grady: 1 14 m/s2  AR Dec Time: 2623 01 ms  AR PHT: 760 67 ms  AR Vmax: 2 98 m/s  AR maxP 51 mmHg  AV Vmax: 1 72 m/s  AV maxP 85 mmHg  TR Vmax: 2 81 m/s  TR maxP 68 mmHg    MM  TAPSE: 1 65 cm    PW  E': 0 05 m/s  E/E': 17 49  MV A Kevan: 1 11 m/s  MV Dec Grady: 4 66 m/s2  MV DecT: 201 48 ms  MV E Kevan: 0 94 m/s  MV E/A Ratio: 0 85    [employer] Accredited Echocardiography Laboratory    Prepared and electronically signed by    Vivian Ferrara MD  Signed 2019 09:44:18       No results found for this or any previous visit  No results found for this or any previous visit  No results found for this or any previous visit

## 2019-07-30 NOTE — NURSING NOTE
Pt discharged to [facility]  IV removed with catheter tip intact  Pressure and dressing applied until bleeding stopped  Dressing CDI  Belongings sent with patient  Report called and given to [facility] RN 2nd Floor nursing station  Discharge instructions discussed with patient  Pt verbalized understanding of instructions

## 2019-07-30 NOTE — DISCHARGE SUMMARY
Discharge- Garret Schwarz 5/24/1931, 80 y o  female MRN: [id number]    Unit/Bed#: -01 Encounter: [id number]    Primary Care Provider: Gui Suh DO   Date and time admitted to hospital: 7/24/2019  6:03 PM        * Small bowel obstruction, partial (HCC)resolved as of 7/30/2019  Assessment & Plan  · Appreciate surgical input  · NGT was discontinued 7/27 evening as small-bowel series shows contrast reached the colon  · Tolerated diet, had large bowel movement  · Cleared by surgery for discharge      Atrial fibrillation with rapid ventricular response (HCC)  Assessment & Plan  · No prior history  · Appreciate Cardiology input  · Continue metoprolol  · Patient was not felt to be an anticoagulation candidate as she was high fall risk as per PT and also patient preference  · Follow up with Cardiology as an outpatient    Chronic combined systolic and diastolic congestive heart failure [facility])  Assessment & Plan  Wt Readings from Last 3 Encounters:   07/30/19 71 8 kg (158 lb 4 6 oz)   08/27/18 60 3 kg (133 lb)   08/25/18 65 8 kg (145 lb)     · Small bilateral pleural effusions and pulmonary vascular congestion   · ECHO- LVEF 50%  Grade 1 diastolic dysfunction  · Daily weight   · Monitor IVF/volume status closely           Mild protein-calorie malnutrition (HCC)  Assessment & Plan  Malnutrition Findings:           BMI Findings: Body mass index is 24 79 kg/m²     Nutrition consult       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, stage 2  Assessment & Plan  · POA   · Continue with local wound care   · Frequent turn and reposition     Hypothyroidism due to acquired atrophy of thyroid  Assessment & Plan  · Resume levothyroxine    CKD (chronic kidney disease) stage 3, GFR 30-59 ml/min (HCC)  Assessment & Plan  · Baseline creatinine appears to be between 1 2-1 4 mg/dL   · Creatinine at baseline  · Resume Lasix on discharge    Essential hypertension  Assessment & Plan  · Resume home medications on discharge        Discharging Physician / Practitioner: Jase England MD  PCP: Giovanni Labs, DO  Admission Date:   Admission Orders (From admission, onward)    Orders from this encounter     Ordered        07/24/19 1949  Inpatient Admission (expected length of stay for this patient Order details is greater than two midnights)  Once               Orders from suspended admission ([address] - [place], [place])     None              Discharge Date: 07/30/19    Resolved Problems  Date Reviewed: 7/30/2019          Resolved    * (Principal) Small bowel obstruction, partial ([address] ) 7/30/2019     Resolved by  Jase England MD          Consultations During Hospital Stay:  · surgery    Procedures Performed:   · n/a    Significant Findings / Test Results:   · Partial small-bowel obstruction    Incidental Findings:   · n/a     Test Results Pending at Discharge (will require follow up):   · n/a     Outpatient Tests Requested:  · n/a    Complications:     none    Reason for Admission:  Abdominal pain    Hospital Course:     Nirav Mata is a 80 y o  female patient who originally presented to the hospital on 7/24/2019 due to abdominal pain and vomiting  Patient was found to have partial small-bowel obstruction  She underwent NG tube decompression was subsequent improvement in her symptoms  Patient was managed by surgery for this  Her NG tube was subsequently removed and she was able to tolerate a diet  She was having regular bowel movements on the day of discharge  Please see above list of diagnoses and related plan for additional information  Condition at Discharge: fair     Discharge Day Visit / Exam:     Subjective:  Patient seen examined  No acute events overnight  Tolerating diet    Vitals: Blood Pressure: 170/86 (07/30/19 1105)  Pulse: 68 (07/30/19 1105)  Temperature: 97 6 °F (36 4 °C) (07/30/19 1105)  Temp Source: Tympanic (07/30/19 1105)  Respirations: 18 (07/30/19 1105)  Height: 5' 7" (170 2 cm) (07/24/19 2115)  Weight - Scale: 71 8 kg (158 lb 4 6 oz) (07/30/19 0600)  SpO2: 99 % (07/30/19 1105)  Exam:   Physical Exam   Constitutional: She appears well-developed and well-nourished  HENT:   Head: Normocephalic and atraumatic  Eyes: Pupils are equal, round, and reactive to light  EOM are normal    Neck: Normal range of motion  Neck supple  Cardiovascular: Normal rate and regular rhythm  Pulmonary/Chest: Effort normal and breath sounds normal    Abdominal: Soft  Bowel sounds are normal  There is no tenderness  Musculoskeletal: Normal range of motion  Neurological: She is alert  No cranial nerve deficit  Skin: Skin is warm  Capillary refill takes less than 2 seconds  Psychiatric: She has a normal mood and affect  Her behavior is normal  Judgment and thought content normal    Nursing note and vitals reviewed  Discussion with Family: n/a    Discharge instructions/Information to patient and family:   See after visit summary for information provided to patient and family  Provisions for Follow-Up Care:  See after visit summary for information related to follow-up care and any pertinent home health orders  Disposition:     [facility] [address]  to [facility]:   · Not Applicable to this Patient - Not Applicable to this Patient    Planned Readmission:    n/a     Discharge Statement:  I spent 35 minutes discharging the patient  This time was spent on the day of discharge  I had direct contact with the patient on the day of discharge  Greater than 50% of the total time was spent examining patient, answering all patient questions, arranging and discussing plan of care with patient as well as directly providing post-discharge instructions  Additional time then spent on discharge activities      Discharge Medications:  See after visit summary for reconciled discharge medications provided to patient and family        ** Please Note: This note has been constructed using a voice recognition system **

## 2019-07-30 NOTE — PROGRESS NOTES
Cardiology Progress Note - Luther Dunham 80 y o  female MRN: [id number]    Unit/Bed#: -01 Encounter: [id number]      Assessment:  Principal Problem:    Small bowel obstruction, partial (HCC)  Active Problems:    Essential hypertension    CKD (chronic kidney disease) stage 3, GFR 30-59 ml/min (HCC)    Hypothyroidism due to acquired atrophy of thyroid    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, stage 2    Mild protein-calorie malnutrition (HCC)    Chronic combined systolic and diastolic congestive heart failure (HCC)    Leukemoid reaction    Cognitive impairment    Diarrhea    Atrial fibrillation with rapid ventricular response ([address] )    1  New-onset atrial fibrillation   2  Small bowel obstruction  3  CKD  4  Hypertension  5  Cognitive impairement      Plan  · Echo done - personally reviewed, LVEF 50%, Grade 1 DD  · On metoprolol 25 bid  · Converted back to NSR overnight with HR in 60-80  · Anticoagulation has been deferred due to patient frailty and fall risk  · Although patient does not report any recent falls  · This was Afib related to acute SBO, and she would benefit from short term anticoagulation  · Await PT/OT eval, and fall risk assessment  Decide anticoagulation based on that  · Discussed with primary team  · Possible discharge tomorroe     Subjective:    No significant events overnight  She had a bowel movement, and tolerated diet    Review of Systems  No c/o chest pain, No c/o palpitations, No c/o shortness of breath, No c/o dizziness or light-headedness, No c/o abdominal pain, no c/o nausea/vomitting  All other systems negative    Telemetry Review: Converted from Afib to NSR overnight  HR now in 60-80s    Objective:   Vitals: Blood pressure 139/67, pulse 60, temperature 98 6 °F (37 °C), temperature source Temporal, resp  rate 18, height 5' 7" (1 702 m), weight 68 kg (149 lb 14 6 oz), SpO2 97 %  , Body mass index is 23 48 kg/m² , Orthostatic Blood Pressures Most Recent Value   Blood Pressure  139/67 filed at 07/29/2019 2205   Patient Position - Orthostatic VS  Lying filed at 07/29/2019 2429         Systolic (61JSK), HFA:140 , Min:92 , JABARI:587     Diastolic (95LZG), DIYA:65, Min:41, Max:70    Wt Readings from Last 5 Encounters:   07/29/19 68 kg (149 lb 14 6 oz)   08/27/18 60 3 kg (133 lb)   08/25/18 65 8 kg (145 lb)   08/25/18 65 8 kg (145 lb 1 oz)   08/21/18 65 8 kg (145 lb)     I/O       07/28 0701 - 07/29 0700 07/29 0701 - 07/30 0700    P  O  0 360    NG/GT      Total Intake(mL/kg) 0 (0) 360 (5 3)    Net 0 +360          Unmeasured Urine Occurrence 1 x 1 x    Unmeasured Stool Occurrence 1 x               Physical Exam    Constitutional:  Gail Schofield appears well, alert and oriented x 3, pleasant and cooperative  No acute distress   HEENT:    Normocephalic, atraumatic   Neck:  Supple, No JVD   Lungs:  Clear to auscultation bilaterally, respirations unlabored    Chest Wall:  No tenderness or deformity    Heart::  Regular rate, Regular rhythm, S1 and S2 normal, no murmur, rub or gallop    Abdomen:  Soft, non-tender, non-distended   Neurological:  Awake, alert, oriented x3   Non-focal exam    Extremities:  No pedal edema, No calf tenderness         Laboratory Results:  Results from last 7 days   Lab Units 07/25/19  0028 07/24/19  2219 07/24/19  1838   TROPONIN I ng/mL 0 04* 0 03 0 03       CBC with diff:   Results from last 7 days   Lab Units 07/29/19  0522 07/28/19  0507 07/27/19  0507 07/26/19  0532 07/25/19  0526 07/24/19  1838 07/23/19  0530   WBC Thousand/uL 7 40 9 80 8 90 9 30 9 00 13 90* 12 00*   HEMOGLOBIN g/dL 9 1* 10 2* 11 1* 11 1* 11 0* 12 3 12 5   HEMATOCRIT % 27 4* 30 2* 32 8* 33 3* 32 8* 37 2* 37 2*   MCV fL 89 87 89 90 89 88 88   PLATELETS Thousands/uL 303 314 358 345 332 360 387   MCH pg 29 6 29 4 30 0 29 9 29 7 29 0 29 7   MCHC g/dL 33 3 33 6 33 9 33 3 33 5 33 1 33 7   RDW % 15 5* 15 1* 15 0* 14 9* 14 7* 15 2* 14 9*   MPV fL 9 1 8 4* 9 4 9 5 9 2 7  7* 8 3*         CMP:  Results from last 7 days   Lab Units 07/29/19  0522 07/28/19  0507 07/27/19  0507 07/26/19  0532 07/25/19  0526 07/24/19  1838 07/23/19  0530   POTASSIUM mmol/L 3 9 3 4* 2 8* 3 6 3 7 4 1 4 0   CHLORIDE mmol/L 111* 106 107 106 105 99 101   CO2 mmol/L 22 26 26 28 27 31 26   BUN mg/dL 26* 28* 30* 33* 35* 40* 37*   CREATININE mg/dL 1 21* 1 31* 1 16 1 19 1 29* 1 48* 1 32*   CALCIUM mg/dL 7 6* 8 1* 8 4* 8 3* 7 9* 8 9 8 7   AST U/L 28  --   --   --  23 27  --    ALT U/L 20  --   --   --  40 55*  --    ALK PHOS U/L 55  --   --   --  68 73  --    EGFR ml/min/1 73sq m 40 36 42 41 37 31 36         BMP:  Results from last 7 days   Lab Units 07/29/19  0522 07/28/19  0507 07/27/19  0507 07/26/19  0532 07/25/19  0526 07/24/19  1838 07/23/19  0530   POTASSIUM mmol/L 3 9 3 4* 2 8* 3 6 3 7 4 1 4 0   CHLORIDE mmol/L 111* 106 107 106 105 99 101   CO2 mmol/L 22 26 26 28 27 31 26   BUN mg/dL 26* 28* 30* 33* 35* 40* 37*   CREATININE mg/dL 1 21* 1 31* 1 16 1 19 1 29* 1 48* 1 32*   CALCIUM mg/dL 7 6* 8 1* 8 4* 8 3* 7 9* 8 9 8 7       BNP: No results for input(s): BNP in the last 72 hours      Magnesium:   Results from last 7 days   Lab Units 07/29/19  0522 07/28/19  0507 07/25/19  0526   MAGNESIUM mg/dL 2 1 2 1 2 3       Coags:   Results from last 7 days   Lab Units 07/29/19  0522 07/24/19  1838   PTT seconds  --  30   INR  1 06 1 06       TSH:        Hemoglobin A1C       Lipid Profile:       Meds/Allergies   all current active meds have been reviewed and current meds: Current Facility-Administered Medications   Medication Dose Route Frequency    heparin (porcine) subcutaneous injection 5,000 Units  5,000 Units Subcutaneous Q8H [facility]    hydrALAZINE (APRESOLINE) injection 10 mg  10 mg Intravenous Q6H PRN    metoprolol tartrate (LOPRESSOR) tablet 25 mg  25 mg Oral Q12H [facility]    ondansetron (ZOFRAN) injection 4 mg  4 mg Intravenous Q6H PRN    pantoprazole (PROTONIX) injection 40 mg  40 mg Intravenous Q24H [facility]    phenol (CHLORASEPTIC) 1 4 % mucosal liquid 1 spray  1 spray Mouth/Throat Q2H PRN    sodium chloride 0 9 % infusion  60 mL/hr Intravenous Continuous     Medications Prior to Admission   Medication    aspirin (ECOTRIN LOW STRENGTH) 81 mg EC tablet    furosemide (LASIX) 40 mg tablet    furosemide (LASIX) 40 mg tablet    levothyroxine 75 mcg tablet    lisinopril (ZESTRIL) 5 mg tablet    nitrofurantoin (MACROBID) 100 mg capsule       sodium chloride 60 mL/hr Last Rate: 60 mL/hr (19 0525)         Cardiac testing:   Results for orders placed during the hospital encounter of 19   Echo complete with contrast if indicated    Narrative [facility] [address]    Transthoracic Echocardiogram  2D, M-mode, Doppler, and Color Doppler    Study date:  2019    Patient: Druscilla Paget  MR number: [id number]  Account number: [de-identified]  : 24-May-1931  Age: 80 years  Gender: Female  Status: Inpatient  [facility] [place]   Height: 67 in  Weight: 144 8 lb  BP: 139/ 65 mmHg    Indications: Cardiomyopathy  Diagnoses: I42 9 - Cardiomyopathy, unspecified    Sonographer:  Inderjit Caruso RDCS  Referring Physician:  Derik Barclay MD  Group:  [facility]  Interpreting Physician:  Heriberto Garcia MD    SUMMARY    LEFT VENTRICLE:  Systolic function was at the lower limits of normal  Ejection fraction was estimated to be 50 %  There was akinesis of the entire inferior and basal-mid inferolateral wall(s)  There was mild concentric hypertrophy  Doppler parameters were consistent with abnormal left ventricular relaxation (grade 1 diastolic dysfunction)  RIGHT VENTRICLE:  The size was normal   Systolic function was normal     MITRAL VALVE:  There was moderate annular calcification  There was trace regurgitation  AORTIC VALVE:  There was mild regurgitation      TRICUSPID VALVE:  There was trace regurgitation  HISTORY: PRIOR HISTORY: Small bowel obstruction, CKD Previous (remote) myocardial infarction  Congestive heart failure  Cardiomyopathy  Risk factors: hypertension  Remote transient ischemic attack  PROCEDURE: The study was performed in the [facility]  This was a routine study  The transthoracic approach was used  The study included complete 2D imaging, M-mode, complete spectral Doppler, and color Doppler  The  heart rate was 67 bpm, at the start of the study  Images were obtained from the parasternal, apical, and suprasternal notch acoustic windows  Images were not obtained from the subcostal acoustic windows  Echocardiographic views were  limited due to restricted patient mobility, poor acoustic window availability, and lung interference  This was a technically difficult study  LEFT VENTRICLE: Size was normal  Systolic function was at the lower limits of normal  Ejection fraction was estimated to be 50 %  There was akinesis of the entire inferior and basal-mid inferolateral wall(s)  Wall thickness was mildly  increased  There was mild concentric hypertrophy  DOPPLER: Doppler parameters were consistent with abnormal left ventricular relaxation (grade 1 diastolic dysfunction)  RIGHT VENTRICLE: The size was normal  Systolic function was normal  Wall thickness was normal     LEFT ATRIUM: Size was normal     RIGHT ATRIUM: Size was normal     MITRAL VALVE: There was moderate annular calcification  Valve structure was normal  There was normal leaflet separation  DOPPLER: The transmitral velocity was within the normal range  There was no evidence for stenosis  There was trace  regurgitation  AORTIC VALVE: The valve was trileaflet  Leaflets exhibited mildly increased thickness, moderate calcification, and normal cuspal separation  DOPPLER: Transaortic velocity was within the normal range  There was no evidence for stenosis  There was mild regurgitation      TRICUSPID VALVE: The valve structure was normal  There was normal leaflet separation  DOPPLER: The transtricuspid velocity was within the normal range  There was no evidence for stenosis  There was trace regurgitation  Estimated peak PA  pressure was 40 mmHg  PULMONIC VALVE: Leaflets exhibited normal thickness, no calcification, and normal cuspal separation  DOPPLER: The transpulmonic velocity was within the normal range  There was no significant regurgitation  PERICARDIUM: There was no pericardial effusion  The pericardium was normal in appearance  AORTA: The root exhibited normal size  SYSTEMIC VEINS: IVC: The inferior vena cava was normal in size  SYSTEM MEASUREMENT TABLES    2D  %FS: 27 69 %  AV Diam: 2 94 cm  EDV(Teich): 74 31 ml  EF(Teich): 54 19 %  ESV(Teich): 34 04 ml  IVSd: 1 2 cm  LA Area: 16 28 cm2  LA Diam: 3 97 cm  LVEDV MOD A4C: 138 72 ml  LVEF MOD A4C: 53 34 %  LVESV MOD A4C: 64 73 ml  LVIDd: 4 1 cm  LVIDs: 2 97 cm  LVLd A4C: 8 cm  LVLs A4C: 6 77 cm  LVOT Diam: 2 cm  LVPWd: 1 18 cm  RA Area: 6 05 cm2  RV Diam : 2 89 cm  SI(Teich): 22 75 ml/m2  SV MOD A4C: 73 99 ml  SV(Cube): 42 93 ml  SV(Teich): 40 27 ml    CW  AR Dec [place]: 1 14 m/s2  AR Dec Time: 2623 01 ms  AR PHT: 760 67 ms  AR Vmax: 2 98 m/s  AR maxP 51 mmHg  AV Vmax: 1 72 m/s  AV maxP 85 mmHg  TR Vmax: 2 81 m/s  TR maxP 68 mmHg    MM  TAPSE: 1 65 cm    PW  E': 0 05 m/s  E/E': 17 49  MV A [place]ev[place]: 1 11 m/s  MV Dec [place]: 4 66 m/s2  MV DecT: 201 48 ms  MV E Kevan: 0 94 m/s  MV E/A Ratio: 0 85    [employer] Accredited Echocardiography Laboratory    Prepared and electronically signed by    Frannie Meraz MD  Signed 2019 09:44:18       No results found for this or any previous visit  No results found for this or any previous visit  No results found for this or any previous visit

## 2019-07-30 NOTE — SOCIAL WORK
Chart reviewed , d/c order written, I called Cristiana Sheree at the [facility] and made her aware of the d/c , report needs to be called to #[id number], rn was made aware, I met with the pt and she does agree with the d/c and d/c plan to return to the [facility] , I called her daughter Kellen Mcrae at 13:10 to # [phone] and made her aware of the d/c and that the pt will be [facility], d/c plan was discussed at care coordination rounds today, pt to be d/c to the [facility]

## 2019-07-30 NOTE — ASSESSMENT & PLAN NOTE
· Appreciate surgical input  · NGT was discontinued 7/27 evening as small-bowel series shows contrast reached the colon    · Tolerated diet, had large bowel movement  · Cleared by surgery for discharge

## 2019-07-30 NOTE — PLAN OF CARE
Problem: DISCHARGE PLANNING - CARE MANAGEMENT  Goal: Discharge to post-acute care or home with appropriate resources  Description  INTERVENTIONS:  - Conduct assessment to determine patient/family and health care team treatment goals, and need for post-acute services based on payer coverage, community resources, and patient preferences, and barriers to discharge  - Address psychosocial, clinical, and financial barriers to discharge as identified in assessment in conjunction with the patient/family and health care team  - Arrange appropriate level of post-acute services according to patient's   needs and preference and payer coverage in collaboration with the physician and health care team  - Communicate with and update the patient/family, physician, and health care team regarding progress on the discharge plan  - Arrange appropriate transportation to post-acute venues    Pt to be d/c to the [facility], pt is a bedhold   Outcome: Completed

## 2019-07-30 NOTE — PLAN OF CARE
Problem: PHYSICAL THERAPY ADULT  Goal: Performs mobility at highest level of function for planned discharge setting  See evaluation for individualized goals  Description  Treatment/Interventions: Functional transfer training, LE strengthening/ROM, Therapeutic exercise, Endurance training, Patient/family training, Equipment eval/education, Bed mobility, Gait training, OT(&neuro re-education w/balance training)     See flowsheet documentation for full assessment, interventions and recommendations  Laura Rojas PT     Outcome: Not Progressing  Note:   Prognosis: Fair  Problem List: Decreased strength, Decreased endurance, Impaired balance, Decreased mobility, Decreased cognition, Impaired judgement, Decreased safety awareness, Impaired hearing, Decreased skin integrity  Assessment: Pt seen for PT treatment session this date with interventions consisting of Therapeutic exercise consisting of: AROM 2 sets of 10 reps B LE in supine position  Pt agreeable to PT treatment session upon arrival, pt found supine in bed w/ HOB elevated, in no apparent distress  In comparison to previous session, pt with no improvements as evidenced by pt deferring transfers today  Post session: all needs in reach, pt in bed, SCDs active  Continue to recommend STR at time of d/c in order to maximize pt's functional independence and safety w/ mobility  Pt continues to be functioning below baseline level, and remains limited 2* factors listed above and including decreased strength, endurance & safe functional mobility  PT will continue to see pt while here in order to address the deficits listed above and provide interventions consistent w/ POC in effort to achieve STGs  Recommendation: Short-term skilled PT     PT - OK to Discharge: Yes(when med cleared to STR)    See flowsheet documentation for full assessment

## 2019-08-02 LAB
ANION GAP SERPL CALCULATED.3IONS-SCNC: 6 MMOL/L (ref 4–13)
BASOPHILS # BLD AUTO: 0.1 THOUSANDS/ΜL (ref 0–0.1)
BASOPHILS NFR BLD AUTO: 1 % (ref 0–2)
BUN SERPL-MCNC: 15 MG/DL (ref 7–25)
CALCIUM SERPL-MCNC: 8.2 MG/DL (ref 8.6–10.5)
CHLORIDE SERPL-SCNC: 109 MMOL/L (ref 98–107)
CO2 SERPL-SCNC: 28 MMOL/L (ref 21–31)
CREAT SERPL-MCNC: 1.25 MG/DL (ref 0.6–1.2)
EOSINOPHIL # BLD AUTO: 0.3 THOUSAND/ΜL (ref 0–0.61)
EOSINOPHIL NFR BLD AUTO: 4 % (ref 0–5)
ERYTHROCYTE [DISTWIDTH] IN BLOOD BY AUTOMATED COUNT: 15.6 % (ref 11.5–14.5)
GFR SERPL CREATININE-BSD FRML MDRD: 38 ML/MIN/1.73SQ M
GLUCOSE SERPL-MCNC: 88 MG/DL (ref 65–99)
HCT VFR BLD AUTO: 32 % (ref 42–47)
HGB BLD-MCNC: 10.9 G/DL (ref 12–16)
LYMPHOCYTES # BLD AUTO: 2 THOUSANDS/ΜL (ref 0.6–4.47)
LYMPHOCYTES NFR BLD AUTO: 25 % (ref 21–51)
MCH RBC QN AUTO: 30 PG (ref 26–34)
MCHC RBC AUTO-ENTMCNC: 34 G/DL (ref 31–37)
MCV RBC AUTO: 88 FL (ref 81–99)
MONOCYTES # BLD AUTO: 0.7 THOUSAND/ΜL (ref 0.17–1.22)
MONOCYTES NFR BLD AUTO: 8 % (ref 2–12)
NEUTROPHILS # BLD AUTO: 5 THOUSANDS/ΜL (ref 1.4–6.5)
NEUTS SEG NFR BLD AUTO: 62 % (ref 42–75)
PLATELET # BLD AUTO: 347 THOUSANDS/UL (ref 149–390)
PMV BLD AUTO: 8.8 FL (ref 8.6–11.7)
POTASSIUM SERPL-SCNC: 3.9 MMOL/L (ref 3.5–5.5)
RBC # BLD AUTO: 3.63 MILLION/UL (ref 3.9–5.2)
SODIUM SERPL-SCNC: 143 MMOL/L (ref 134–143)
WBC # BLD AUTO: 8.2 THOUSAND/UL (ref 4.8–10.8)

## 2019-08-02 PROCEDURE — 85025 COMPLETE CBC W/AUTO DIFF WBC: CPT | Performed by: INTERNAL MEDICINE

## 2019-08-02 PROCEDURE — 80048 BASIC METABOLIC PNL TOTAL CA: CPT | Performed by: INTERNAL MEDICINE

## 2019-08-15 LAB
ANION GAP SERPL CALCULATED.3IONS-SCNC: 5 MMOL/L (ref 4–13)
BASOPHILS # BLD AUTO: 0 THOUSANDS/ΜL (ref 0–0.1)
BASOPHILS NFR BLD AUTO: 1 % (ref 0–2)
BUN SERPL-MCNC: 23 MG/DL (ref 7–25)
CALCIUM SERPL-MCNC: 9.1 MG/DL (ref 8.6–10.5)
CHLORIDE SERPL-SCNC: 99 MMOL/L (ref 98–107)
CO2 SERPL-SCNC: 38 MMOL/L (ref 21–31)
CREAT SERPL-MCNC: 1.24 MG/DL (ref 0.6–1.2)
EOSINOPHIL # BLD AUTO: 0.2 THOUSAND/ΜL (ref 0–0.61)
EOSINOPHIL NFR BLD AUTO: 4 % (ref 0–5)
ERYTHROCYTE [DISTWIDTH] IN BLOOD BY AUTOMATED COUNT: 15.5 % (ref 11.5–14.5)
GFR SERPL CREATININE-BSD FRML MDRD: 39 ML/MIN/1.73SQ M
GLUCOSE P FAST SERPL-MCNC: 87 MG/DL (ref 65–99)
GLUCOSE SERPL-MCNC: 87 MG/DL (ref 65–99)
HCT VFR BLD AUTO: 27.9 % (ref 42–47)
HGB BLD-MCNC: 9.6 G/DL (ref 12–16)
LYMPHOCYTES # BLD AUTO: 1.7 THOUSANDS/ΜL (ref 0.6–4.47)
LYMPHOCYTES NFR BLD AUTO: 32 % (ref 21–51)
MCH RBC QN AUTO: 33 PG (ref 26–34)
MCHC RBC AUTO-ENTMCNC: 34.3 G/DL (ref 31–37)
MCV RBC AUTO: 96 FL (ref 81–99)
MONOCYTES # BLD AUTO: 0.6 THOUSAND/ΜL (ref 0.17–1.22)
MONOCYTES NFR BLD AUTO: 11 % (ref 2–12)
NEUTROPHILS # BLD AUTO: 2.8 THOUSANDS/ΜL (ref 1.4–6.5)
NEUTS SEG NFR BLD AUTO: 53 % (ref 42–75)
PLATELET # BLD AUTO: 284 THOUSANDS/UL (ref 149–390)
PMV BLD AUTO: 8.7 FL (ref 8.6–11.7)
POTASSIUM SERPL-SCNC: 4.3 MMOL/L (ref 3.5–5.5)
RBC # BLD AUTO: 2.9 MILLION/UL (ref 3.9–5.2)
SODIUM SERPL-SCNC: 142 MMOL/L (ref 134–143)
WBC # BLD AUTO: 5.3 THOUSAND/UL (ref 4.8–10.8)

## 2019-08-15 PROCEDURE — 80048 BASIC METABOLIC PNL TOTAL CA: CPT | Performed by: INTERNAL MEDICINE

## 2019-08-15 PROCEDURE — 85025 COMPLETE CBC W/AUTO DIFF WBC: CPT | Performed by: INTERNAL MEDICINE

## 2019-08-22 LAB
ANION GAP SERPL CALCULATED.3IONS-SCNC: 6 MMOL/L (ref 4–13)
BASOPHILS # BLD AUTO: 0.1 THOUSANDS/ΜL (ref 0–0.1)
BASOPHILS NFR BLD AUTO: 1 % (ref 0–2)
BUN SERPL-MCNC: 24 MG/DL (ref 7–25)
CALCIUM SERPL-MCNC: 9.2 MG/DL (ref 8.6–10.5)
CHLORIDE SERPL-SCNC: 101 MMOL/L (ref 98–107)
CO2 SERPL-SCNC: 35 MMOL/L (ref 21–31)
CREAT SERPL-MCNC: 1.26 MG/DL (ref 0.6–1.2)
EOSINOPHIL # BLD AUTO: 0.2 THOUSAND/ΜL (ref 0–0.61)
EOSINOPHIL NFR BLD AUTO: 3 % (ref 0–5)
ERYTHROCYTE [DISTWIDTH] IN BLOOD BY AUTOMATED COUNT: 15.1 % (ref 11.5–14.5)
GFR SERPL CREATININE-BSD FRML MDRD: 38 ML/MIN/1.73SQ M
GLUCOSE SERPL-MCNC: 88 MG/DL (ref 65–99)
HCT VFR BLD AUTO: 37.6 % (ref 42–47)
HGB BLD-MCNC: 12.4 G/DL (ref 12–16)
LYMPHOCYTES # BLD AUTO: 1.2 THOUSANDS/ΜL (ref 0.6–4.47)
LYMPHOCYTES NFR BLD AUTO: 18 % (ref 21–51)
MCH RBC QN AUTO: 31.6 PG (ref 26–34)
MCHC RBC AUTO-ENTMCNC: 33 G/DL (ref 31–37)
MCV RBC AUTO: 96 FL (ref 81–99)
MONOCYTES # BLD AUTO: 0.6 THOUSAND/ΜL (ref 0.17–1.22)
MONOCYTES NFR BLD AUTO: 9 % (ref 2–12)
NEUTROPHILS # BLD AUTO: 4.5 THOUSANDS/ΜL (ref 1.4–6.5)
NEUTS SEG NFR BLD AUTO: 70 % (ref 42–75)
PLATELET # BLD AUTO: 119 THOUSANDS/UL (ref 149–390)
PMV BLD AUTO: 10.2 FL (ref 8.6–11.7)
POTASSIUM SERPL-SCNC: 5.1 MMOL/L (ref 3.5–5.5)
RBC # BLD AUTO: 3.93 MILLION/UL (ref 3.9–5.2)
SODIUM SERPL-SCNC: 142 MMOL/L (ref 134–143)
WBC # BLD AUTO: 6.4 THOUSAND/UL (ref 4.8–10.8)

## 2019-08-22 PROCEDURE — 85025 COMPLETE CBC W/AUTO DIFF WBC: CPT | Performed by: INTERNAL MEDICINE

## 2019-08-22 PROCEDURE — 80048 BASIC METABOLIC PNL TOTAL CA: CPT | Performed by: INTERNAL MEDICINE

## 2019-08-30 LAB
ANION GAP SERPL CALCULATED.3IONS-SCNC: 6 MMOL/L (ref 4–13)
BUN SERPL-MCNC: 31 MG/DL (ref 7–25)
CALCIUM SERPL-MCNC: 9 MG/DL (ref 8.6–10.5)
CHLORIDE SERPL-SCNC: 106 MMOL/L (ref 98–107)
CO2 SERPL-SCNC: 30 MMOL/L (ref 21–31)
CREAT SERPL-MCNC: 1.48 MG/DL (ref 0.6–1.2)
GFR SERPL CREATININE-BSD FRML MDRD: 31 ML/MIN/1.73SQ M
GLUCOSE SERPL-MCNC: 85 MG/DL (ref 65–99)
POTASSIUM SERPL-SCNC: 4 MMOL/L (ref 3.5–5.5)
SODIUM SERPL-SCNC: 142 MMOL/L (ref 134–143)

## 2019-08-30 PROCEDURE — 80048 BASIC METABOLIC PNL TOTAL CA: CPT | Performed by: INTERNAL MEDICINE

## 2019-09-03 LAB
ANION GAP SERPL CALCULATED.3IONS-SCNC: 7 MMOL/L (ref 4–13)
BASOPHILS # BLD AUTO: 0 THOUSANDS/ΜL (ref 0–0.1)
BASOPHILS NFR BLD AUTO: 1 % (ref 0–2)
BUN SERPL-MCNC: 32 MG/DL (ref 7–25)
CALCIUM SERPL-MCNC: 9.2 MG/DL (ref 8.6–10.5)
CHLORIDE SERPL-SCNC: 104 MMOL/L (ref 98–107)
CO2 SERPL-SCNC: 30 MMOL/L (ref 21–31)
CREAT SERPL-MCNC: 1.34 MG/DL (ref 0.6–1.2)
EOSINOPHIL # BLD AUTO: 0.2 THOUSAND/ΜL (ref 0–0.61)
EOSINOPHIL NFR BLD AUTO: 3 % (ref 0–5)
ERYTHROCYTE [DISTWIDTH] IN BLOOD BY AUTOMATED COUNT: 16.2 % (ref 11.5–14.5)
GFR SERPL CREATININE-BSD FRML MDRD: 35 ML/MIN/1.73SQ M
GLUCOSE P FAST SERPL-MCNC: 86 MG/DL (ref 65–99)
GLUCOSE SERPL-MCNC: 86 MG/DL (ref 65–99)
HCT VFR BLD AUTO: 27.1 % (ref 42–47)
HGB BLD-MCNC: 9.4 G/DL (ref 12–16)
LYMPHOCYTES # BLD AUTO: 1.9 THOUSANDS/ΜL (ref 0.6–4.47)
LYMPHOCYTES NFR BLD AUTO: 32 % (ref 21–51)
MCH RBC QN AUTO: 32.8 PG (ref 26–34)
MCHC RBC AUTO-ENTMCNC: 34.5 G/DL (ref 31–37)
MCV RBC AUTO: 95 FL (ref 81–99)
MONOCYTES # BLD AUTO: 0.7 THOUSAND/ΜL (ref 0.17–1.22)
MONOCYTES NFR BLD AUTO: 13 % (ref 2–12)
NEUTROPHILS # BLD AUTO: 3 THOUSANDS/ΜL (ref 1.4–6.5)
NEUTS SEG NFR BLD AUTO: 52 % (ref 42–75)
PLATELET # BLD AUTO: 264 THOUSANDS/UL (ref 149–390)
PMV BLD AUTO: 9.2 FL (ref 8.6–11.7)
POTASSIUM SERPL-SCNC: 4 MMOL/L (ref 3.5–5.5)
RBC # BLD AUTO: 2.85 MILLION/UL (ref 3.9–5.2)
SODIUM SERPL-SCNC: 141 MMOL/L (ref 134–143)
WBC # BLD AUTO: 5.8 THOUSAND/UL (ref 4.8–10.8)

## 2019-09-03 PROCEDURE — 85025 COMPLETE CBC W/AUTO DIFF WBC: CPT | Performed by: INTERNAL MEDICINE

## 2019-09-03 PROCEDURE — 80048 BASIC METABOLIC PNL TOTAL CA: CPT | Performed by: INTERNAL MEDICINE

## 2019-09-13 LAB
ANION GAP SERPL CALCULATED.3IONS-SCNC: 7 MMOL/L (ref 4–13)
BUN SERPL-MCNC: 40 MG/DL (ref 7–25)
CALCIUM SERPL-MCNC: 9.3 MG/DL (ref 8.6–10.5)
CHLORIDE SERPL-SCNC: 104 MMOL/L (ref 98–107)
CO2 SERPL-SCNC: 30 MMOL/L (ref 21–31)
CREAT SERPL-MCNC: 1.46 MG/DL (ref 0.6–1.2)
GFR SERPL CREATININE-BSD FRML MDRD: 32 ML/MIN/1.73SQ M
GLUCOSE SERPL-MCNC: 95 MG/DL (ref 65–99)
POTASSIUM SERPL-SCNC: 4.4 MMOL/L (ref 3.5–5.5)
SODIUM SERPL-SCNC: 141 MMOL/L (ref 134–143)

## 2019-09-13 PROCEDURE — 80048 BASIC METABOLIC PNL TOTAL CA: CPT | Performed by: INTERNAL MEDICINE

## 2019-09-27 LAB
ANION GAP SERPL CALCULATED.3IONS-SCNC: 6 MMOL/L (ref 4–13)
BUN SERPL-MCNC: 29 MG/DL (ref 7–25)
CALCIUM SERPL-MCNC: 9.3 MG/DL (ref 8.6–10.5)
CHLORIDE SERPL-SCNC: 107 MMOL/L (ref 98–107)
CO2 SERPL-SCNC: 28 MMOL/L (ref 21–31)
CREAT SERPL-MCNC: 1.4 MG/DL (ref 0.6–1.2)
GFR SERPL CREATININE-BSD FRML MDRD: 34 ML/MIN/1.73SQ M
GLUCOSE SERPL-MCNC: 90 MG/DL (ref 65–99)
POTASSIUM SERPL-SCNC: 4 MMOL/L (ref 3.5–5.5)
SODIUM SERPL-SCNC: 141 MMOL/L (ref 134–143)

## 2019-09-27 PROCEDURE — 80048 BASIC METABOLIC PNL TOTAL CA: CPT | Performed by: INTERNAL MEDICINE

## 2019-10-01 LAB
ANION GAP SERPL CALCULATED.3IONS-SCNC: 7 MMOL/L (ref 4–13)
BASOPHILS # BLD AUTO: 0 THOUSANDS/ΜL (ref 0–0.1)
BASOPHILS NFR BLD AUTO: 1 % (ref 0–2)
BUN SERPL-MCNC: 39 MG/DL (ref 7–25)
CALCIUM SERPL-MCNC: 9.2 MG/DL (ref 8.6–10.5)
CHLORIDE SERPL-SCNC: 108 MMOL/L (ref 98–107)
CO2 SERPL-SCNC: 27 MMOL/L (ref 21–31)
CREAT SERPL-MCNC: 1.31 MG/DL (ref 0.6–1.2)
EOSINOPHIL # BLD AUTO: 0.2 THOUSAND/ΜL (ref 0–0.61)
EOSINOPHIL NFR BLD AUTO: 4 % (ref 0–5)
ERYTHROCYTE [DISTWIDTH] IN BLOOD BY AUTOMATED COUNT: 15.8 % (ref 11.5–14.5)
GFR SERPL CREATININE-BSD FRML MDRD: 36 ML/MIN/1.73SQ M
GLUCOSE P FAST SERPL-MCNC: 83 MG/DL (ref 65–99)
GLUCOSE SERPL-MCNC: 83 MG/DL (ref 65–99)
HCT VFR BLD AUTO: 26.5 % (ref 42–47)
HGB BLD-MCNC: 9 G/DL (ref 12–16)
LYMPHOCYTES # BLD AUTO: 1.7 THOUSANDS/ΜL (ref 0.6–4.47)
LYMPHOCYTES NFR BLD AUTO: 34 % (ref 21–51)
MCH RBC QN AUTO: 31.8 PG (ref 26–34)
MCHC RBC AUTO-ENTMCNC: 33.9 G/DL (ref 31–37)
MCV RBC AUTO: 94 FL (ref 81–99)
MONOCYTES # BLD AUTO: 0.6 THOUSAND/ΜL (ref 0.17–1.22)
MONOCYTES NFR BLD AUTO: 12 % (ref 2–12)
NEUTROPHILS # BLD AUTO: 2.5 THOUSANDS/ΜL (ref 1.4–6.5)
NEUTS SEG NFR BLD AUTO: 50 % (ref 42–75)
PLATELET # BLD AUTO: 251 THOUSANDS/UL (ref 149–390)
PMV BLD AUTO: 9.3 FL (ref 8.6–11.7)
POTASSIUM SERPL-SCNC: 4.2 MMOL/L (ref 3.5–5.5)
RBC # BLD AUTO: 2.83 MILLION/UL (ref 3.9–5.2)
SODIUM SERPL-SCNC: 142 MMOL/L (ref 134–143)
WBC # BLD AUTO: 5 THOUSAND/UL (ref 4.8–10.8)

## 2019-10-01 PROCEDURE — 85025 COMPLETE CBC W/AUTO DIFF WBC: CPT | Performed by: INTERNAL MEDICINE

## 2019-10-01 PROCEDURE — 80048 BASIC METABOLIC PNL TOTAL CA: CPT | Performed by: INTERNAL MEDICINE

## 2019-10-24 ENCOUNTER — TRANSCRIBE ORDERS (OUTPATIENT)
Dept: ADMINISTRATIVE | Facility: HOSPITAL | Age: 84
End: 2019-10-24

## 2019-10-24 DIAGNOSIS — L97.509 ULCER OF FOOT, UNSPECIFIED LATERALITY, UNSPECIFIED ULCER STAGE (HCC): Primary | ICD-10-CM

## 2019-10-28 ENCOUNTER — HOSPITAL ENCOUNTER (OUTPATIENT)
Dept: NON INVASIVE DIAGNOSTICS | Facility: HOSPITAL | Age: 84
Discharge: HOME | End: 2019-10-28
Attending: PODIATRIST
Payer: MEDICARE

## 2019-10-28 DIAGNOSIS — L97.509 ULCER OF FOOT, UNSPECIFIED LATERALITY, UNSPECIFIED ULCER STAGE (HCC): ICD-10-CM

## 2019-10-28 PROCEDURE — 93923 UPR/LXTR ART STDY 3+ LVLS: CPT

## 2019-10-28 PROCEDURE — 93923 UPR/LXTR ART STDY 3+ LVLS: CPT | Performed by: SURGERY

## 2019-11-05 LAB
ANION GAP SERPL CALCULATED.3IONS-SCNC: 8 MMOL/L (ref 4–13)
BASOPHILS # BLD AUTO: 0 THOUSANDS/ΜL (ref 0–0.1)
BASOPHILS NFR BLD AUTO: 1 % (ref 0–2)
BUN SERPL-MCNC: 38 MG/DL (ref 7–25)
CALCIUM SERPL-MCNC: 8.8 MG/DL (ref 8.6–10.5)
CHLORIDE SERPL-SCNC: 106 MMOL/L (ref 98–107)
CO2 SERPL-SCNC: 27 MMOL/L (ref 21–31)
CREAT SERPL-MCNC: 1.52 MG/DL (ref 0.6–1.2)
EOSINOPHIL # BLD AUTO: 0.3 THOUSAND/ΜL (ref 0–0.61)
EOSINOPHIL NFR BLD AUTO: 4 % (ref 0–5)
ERYTHROCYTE [DISTWIDTH] IN BLOOD BY AUTOMATED COUNT: 14.8 % (ref 11.5–14.5)
GFR SERPL CREATININE-BSD FRML MDRD: 30 ML/MIN/1.73SQ M
GLUCOSE SERPL-MCNC: 84 MG/DL (ref 65–99)
HCT VFR BLD AUTO: 28.3 % (ref 42–47)
HGB BLD-MCNC: 9.2 G/DL (ref 12–16)
LYMPHOCYTES # BLD AUTO: 2.2 THOUSANDS/ΜL (ref 0.6–4.47)
LYMPHOCYTES NFR BLD AUTO: 37 % (ref 21–51)
MCH RBC QN AUTO: 28.5 PG (ref 26–34)
MCHC RBC AUTO-ENTMCNC: 32.4 G/DL (ref 31–37)
MCV RBC AUTO: 88 FL (ref 81–99)
MONOCYTES # BLD AUTO: 0.7 THOUSAND/ΜL (ref 0.17–1.22)
MONOCYTES NFR BLD AUTO: 11 % (ref 2–12)
NEUTROPHILS # BLD AUTO: 2.9 THOUSANDS/ΜL (ref 1.4–6.5)
NEUTS SEG NFR BLD AUTO: 47 % (ref 42–75)
PLATELET # BLD AUTO: 263 THOUSANDS/UL (ref 149–390)
PMV BLD AUTO: 8.3 FL (ref 8.6–11.7)
POTASSIUM SERPL-SCNC: 4.1 MMOL/L (ref 3.5–5.5)
RBC # BLD AUTO: 3.22 MILLION/UL (ref 3.9–5.2)
SODIUM SERPL-SCNC: 141 MMOL/L (ref 134–143)
WBC # BLD AUTO: 6.1 THOUSAND/UL (ref 4.8–10.8)

## 2019-11-05 PROCEDURE — 80048 BASIC METABOLIC PNL TOTAL CA: CPT | Performed by: INTERNAL MEDICINE

## 2019-11-05 PROCEDURE — 85025 COMPLETE CBC W/AUTO DIFF WBC: CPT | Performed by: INTERNAL MEDICINE

## 2019-11-10 PROBLEM — D64.9 ANEMIA: Status: ACTIVE | Noted: 2019-11-10

## 2019-11-11 LAB
ANION GAP SERPL CALCULATED.3IONS-SCNC: 7 MMOL/L (ref 4–13)
BUN SERPL-MCNC: 38 MG/DL (ref 7–25)
CALCIUM SERPL-MCNC: 9.1 MG/DL (ref 8.6–10.5)
CHLORIDE SERPL-SCNC: 103 MMOL/L (ref 98–107)
CO2 SERPL-SCNC: 30 MMOL/L (ref 21–31)
CREAT SERPL-MCNC: 1.44 MG/DL (ref 0.6–1.2)
ERYTHROCYTE [DISTWIDTH] IN BLOOD BY AUTOMATED COUNT: 14.6 % (ref 11.5–14.5)
FOLATE SERPL-MCNC: 19 NG/ML (ref 3.1–17.5)
GFR SERPL CREATININE-BSD FRML MDRD: 32 ML/MIN/1.73SQ M
GLUCOSE SERPL-MCNC: 85 MG/DL (ref 65–99)
HCT VFR BLD AUTO: 30.9 % (ref 42–47)
HGB BLD-MCNC: 10.2 G/DL (ref 12–16)
IRON SERPL-MCNC: 55 UG/DL (ref 50–170)
MCH RBC QN AUTO: 29.3 PG (ref 26–34)
MCHC RBC AUTO-ENTMCNC: 33 G/DL (ref 31–37)
MCV RBC AUTO: 89 FL (ref 81–99)
PLATELET # BLD AUTO: 273 THOUSANDS/UL (ref 149–390)
PMV BLD AUTO: 8.4 FL (ref 8.6–11.7)
POTASSIUM SERPL-SCNC: 4.2 MMOL/L (ref 3.5–5.5)
RBC # BLD AUTO: 3.48 MILLION/UL (ref 3.9–5.2)
RETICS # CALC: 0.87 % (ref 0.37–1.87)
SODIUM SERPL-SCNC: 140 MMOL/L (ref 134–143)
VIT B12 SERPL-MCNC: 393 PG/ML (ref 100–900)
WBC # BLD AUTO: 6.1 THOUSAND/UL (ref 4.8–10.8)

## 2019-11-11 PROCEDURE — 83540 ASSAY OF IRON: CPT | Performed by: INTERNAL MEDICINE

## 2019-11-11 PROCEDURE — 80048 BASIC METABOLIC PNL TOTAL CA: CPT | Performed by: INTERNAL MEDICINE

## 2019-11-11 PROCEDURE — 85045 AUTOMATED RETICULOCYTE COUNT: CPT | Performed by: INTERNAL MEDICINE

## 2019-11-11 PROCEDURE — 85027 COMPLETE CBC AUTOMATED: CPT | Performed by: INTERNAL MEDICINE

## 2019-11-11 PROCEDURE — 82746 ASSAY OF FOLIC ACID SERUM: CPT | Performed by: INTERNAL MEDICINE

## 2019-11-11 PROCEDURE — 82607 VITAMIN B-12: CPT | Performed by: INTERNAL MEDICINE

## 2020-01-01 ENCOUNTER — APPOINTMENT (OUTPATIENT)
Dept: RADIOLOGY | Facility: HOSPITAL | Age: 85
End: 2020-01-01
Payer: MEDICARE

## 2020-01-01 ENCOUNTER — HOSPITAL ENCOUNTER (OUTPATIENT)
Dept: NON INVASIVE DIAGNOSTICS | Facility: HOSPITAL | Age: 85
Discharge: HOME | End: 2020-07-02
Attending: SURGERY
Payer: MEDICARE

## 2020-01-01 ENCOUNTER — TELEMEDICINE (OUTPATIENT)
Dept: VASCULAR SURGERY | Facility: HOSPITAL | Age: 85
End: 2020-01-01
Payer: MEDICARE

## 2020-01-01 ENCOUNTER — CONSULT (OUTPATIENT)
Dept: VASCULAR SURGERY | Facility: HOSPITAL | Age: 85
End: 2020-01-01
Payer: MEDICARE

## 2020-01-01 ENCOUNTER — TELEPHONE (OUTPATIENT)
Dept: OTHER | Facility: OTHER | Age: 85
End: 2020-01-01

## 2020-01-01 VITALS
BODY MASS INDEX: 24.79 KG/M2 | DIASTOLIC BLOOD PRESSURE: 76 MMHG | SYSTOLIC BLOOD PRESSURE: 167 MMHG | HEART RATE: 59 BPM | HEIGHT: 67 IN

## 2020-01-01 DIAGNOSIS — I77.9 PAOD (PERIPHERAL ARTERIAL OCCLUSIVE DISEASE) (HCC): Primary | ICD-10-CM

## 2020-01-01 DIAGNOSIS — N18.30 CKD (CHRONIC KIDNEY DISEASE) STAGE 3, GFR 30-59 ML/MIN (HCC): Chronic | ICD-10-CM

## 2020-01-01 DIAGNOSIS — I77.9 PAOD (PERIPHERAL ARTERIAL OCCLUSIVE DISEASE) (HCC): ICD-10-CM

## 2020-01-01 DIAGNOSIS — I50.42 CHRONIC COMBINED SYSTOLIC AND DIASTOLIC CONGESTIVE HEART FAILURE (HCC): ICD-10-CM

## 2020-01-01 LAB
ANION GAP SERPL CALCULATED.3IONS-SCNC: 10 MMOL/L (ref 4–13)
ANION GAP SERPL CALCULATED.3IONS-SCNC: 10 MMOL/L (ref 4–13)
ANION GAP SERPL CALCULATED.3IONS-SCNC: 6 MMOL/L (ref 4–13)
ANION GAP SERPL CALCULATED.3IONS-SCNC: 6 MMOL/L (ref 4–13)
ANION GAP SERPL CALCULATED.3IONS-SCNC: 7 MMOL/L (ref 4–13)
ANION GAP SERPL CALCULATED.3IONS-SCNC: 8 MMOL/L (ref 4–13)
ANION GAP SERPL CALCULATED.3IONS-SCNC: 8 MMOL/L (ref 4–13)
BACTERIA WND AEROBE CULT: ABNORMAL
BACTERIA WND AEROBE CULT: ABNORMAL
BASOPHILS # BLD AUTO: 0 THOUSANDS/ΜL (ref 0–0.1)
BASOPHILS # BLD AUTO: 0 THOUSANDS/ΜL (ref 0–0.1)
BASOPHILS # BLD AUTO: 0.1 THOUSANDS/ΜL (ref 0–0.1)
BASOPHILS NFR BLD AUTO: 0 % (ref 0–2)
BASOPHILS NFR BLD AUTO: 1 % (ref 0–2)
BASOPHILS NFR BLD AUTO: 2 % (ref 0–2)
BUN SERPL-MCNC: 33 MG/DL (ref 7–25)
BUN SERPL-MCNC: 36 MG/DL (ref 7–25)
BUN SERPL-MCNC: 37 MG/DL (ref 7–25)
BUN SERPL-MCNC: 38 MG/DL (ref 7–25)
BUN SERPL-MCNC: 39 MG/DL (ref 7–25)
BUN SERPL-MCNC: 39 MG/DL (ref 7–25)
BUN SERPL-MCNC: 41 MG/DL (ref 7–25)
BUN SERPL-MCNC: 42 MG/DL (ref 7–25)
BUN SERPL-MCNC: 43 MG/DL (ref 7–25)
CALCIUM SERPL-MCNC: 8.6 MG/DL (ref 8.6–10.5)
CALCIUM SERPL-MCNC: 8.8 MG/DL (ref 8.6–10.5)
CALCIUM SERPL-MCNC: 8.8 MG/DL (ref 8.6–10.5)
CALCIUM SERPL-MCNC: 8.9 MG/DL (ref 8.6–10.5)
CALCIUM SERPL-MCNC: 9 MG/DL (ref 8.6–10.5)
CALCIUM SERPL-MCNC: 9.1 MG/DL (ref 8.6–10.5)
CALCIUM SERPL-MCNC: 9.3 MG/DL (ref 8.6–10.5)
CHLORIDE SERPL-SCNC: 104 MMOL/L (ref 98–107)
CHLORIDE SERPL-SCNC: 105 MMOL/L (ref 98–107)
CHLORIDE SERPL-SCNC: 106 MMOL/L (ref 98–107)
CHLORIDE SERPL-SCNC: 106 MMOL/L (ref 98–107)
CHLORIDE SERPL-SCNC: 107 MMOL/L (ref 98–107)
CO2 SERPL-SCNC: 27 MMOL/L (ref 21–31)
CO2 SERPL-SCNC: 28 MMOL/L (ref 21–31)
CO2 SERPL-SCNC: 31 MMOL/L (ref 21–31)
CREAT SERPL-MCNC: 1.45 MG/DL (ref 0.6–1.2)
CREAT SERPL-MCNC: 1.46 MG/DL (ref 0.6–1.2)
CREAT SERPL-MCNC: 1.58 MG/DL (ref 0.6–1.2)
CREAT SERPL-MCNC: 1.62 MG/DL (ref 0.6–1.2)
CREAT SERPL-MCNC: 1.66 MG/DL (ref 0.6–1.2)
CREAT SERPL-MCNC: 1.67 MG/DL (ref 0.6–1.2)
CREAT SERPL-MCNC: 1.67 MG/DL (ref 0.6–1.2)
CREAT SERPL-MCNC: 1.75 MG/DL (ref 0.6–1.2)
CREAT SERPL-MCNC: 2.07 MG/DL (ref 0.6–1.2)
EOSINOPHIL # BLD AUTO: 0.1 THOUSAND/ΜL (ref 0–0.61)
EOSINOPHIL # BLD AUTO: 0.2 THOUSAND/ΜL (ref 0–0.61)
EOSINOPHIL # BLD AUTO: 0.3 THOUSAND/ΜL (ref 0–0.61)
EOSINOPHIL NFR BLD AUTO: 2 % (ref 0–5)
EOSINOPHIL NFR BLD AUTO: 2 % (ref 0–5)
EOSINOPHIL NFR BLD AUTO: 4 % (ref 0–5)
EOSINOPHIL NFR BLD AUTO: 5 % (ref 0–5)
ERYTHROCYTE [DISTWIDTH] IN BLOOD BY AUTOMATED COUNT: 15.1 % (ref 11.5–14.5)
ERYTHROCYTE [DISTWIDTH] IN BLOOD BY AUTOMATED COUNT: 15.2 % (ref 11.5–14.5)
ERYTHROCYTE [DISTWIDTH] IN BLOOD BY AUTOMATED COUNT: 15.3 % (ref 11.5–14.5)
ERYTHROCYTE [DISTWIDTH] IN BLOOD BY AUTOMATED COUNT: 15.3 % (ref 11.5–14.5)
ERYTHROCYTE [DISTWIDTH] IN BLOOD BY AUTOMATED COUNT: 15.5 % (ref 11.5–14.5)
ERYTHROCYTE [DISTWIDTH] IN BLOOD BY AUTOMATED COUNT: 15.6 % (ref 11.5–14.5)
ERYTHROCYTE [DISTWIDTH] IN BLOOD BY AUTOMATED COUNT: 16 % (ref 11.5–14.5)
ERYTHROCYTE [DISTWIDTH] IN BLOOD BY AUTOMATED COUNT: 16.1 % (ref 11.5–14.5)
ERYTHROCYTE [DISTWIDTH] IN BLOOD BY AUTOMATED COUNT: 16.1 % (ref 11.5–14.5)
ERYTHROCYTE [DISTWIDTH] IN BLOOD BY AUTOMATED COUNT: 16.4 % (ref 11.5–14.5)
ERYTHROCYTE [DISTWIDTH] IN BLOOD BY AUTOMATED COUNT: 16.4 % (ref 11.5–14.5)
ERYTHROCYTE [DISTWIDTH] IN BLOOD BY AUTOMATED COUNT: 16.7 % (ref 11.5–14.5)
GFR SERPL CREATININE-BSD FRML MDRD: 21 ML/MIN/1.73SQ M
GFR SERPL CREATININE-BSD FRML MDRD: 26 ML/MIN/1.73SQ M
GFR SERPL CREATININE-BSD FRML MDRD: 27 ML/MIN/1.73SQ M
GFR SERPL CREATININE-BSD FRML MDRD: 28 ML/MIN/1.73SQ M
GFR SERPL CREATININE-BSD FRML MDRD: 29 ML/MIN/1.73SQ M
GFR SERPL CREATININE-BSD FRML MDRD: 32 ML/MIN/1.73SQ M
GFR SERPL CREATININE-BSD FRML MDRD: 32 ML/MIN/1.73SQ M
GLUCOSE SERPL-MCNC: 100 MG/DL (ref 65–99)
GLUCOSE SERPL-MCNC: 77 MG/DL (ref 65–140)
GLUCOSE SERPL-MCNC: 77 MG/DL (ref 65–99)
GLUCOSE SERPL-MCNC: 79 MG/DL (ref 65–140)
GLUCOSE SERPL-MCNC: 79 MG/DL (ref 65–99)
GLUCOSE SERPL-MCNC: 80 MG/DL (ref 65–140)
GLUCOSE SERPL-MCNC: 80 MG/DL (ref 65–99)
GLUCOSE SERPL-MCNC: 81 MG/DL (ref 65–99)
GLUCOSE SERPL-MCNC: 83 MG/DL (ref 65–99)
GLUCOSE SERPL-MCNC: 86 MG/DL (ref 65–99)
GLUCOSE SERPL-MCNC: 86 MG/DL (ref 65–99)
GLUCOSE SERPL-MCNC: 90 MG/DL (ref 65–140)
GLUCOSE SERPL-MCNC: 93 MG/DL (ref 65–99)
GRAM STN SPEC: ABNORMAL
GRAM STN SPEC: ABNORMAL
HCT VFR BLD AUTO: 27.7 % (ref 42–47)
HCT VFR BLD AUTO: 29.1 % (ref 42–47)
HCT VFR BLD AUTO: 29.6 % (ref 42–47)
HCT VFR BLD AUTO: 30 % (ref 42–47)
HCT VFR BLD AUTO: 30.3 % (ref 42–47)
HCT VFR BLD AUTO: 30.5 % (ref 42–47)
HCT VFR BLD AUTO: 30.8 % (ref 42–47)
HCT VFR BLD AUTO: 30.9 % (ref 42–47)
HCT VFR BLD AUTO: 31.2 % (ref 42–47)
HCT VFR BLD AUTO: 32.5 % (ref 42–47)
HGB BLD-MCNC: 10 G/DL (ref 12–16)
HGB BLD-MCNC: 10.1 G/DL (ref 12–16)
HGB BLD-MCNC: 10.2 G/DL (ref 12–16)
HGB BLD-MCNC: 10.2 G/DL (ref 12–16)
HGB BLD-MCNC: 10.3 G/DL (ref 12–16)
HGB BLD-MCNC: 10.8 G/DL (ref 12–16)
HGB BLD-MCNC: 9.1 G/DL (ref 12–16)
HGB BLD-MCNC: 9.6 G/DL (ref 12–16)
HGB BLD-MCNC: 9.6 G/DL (ref 12–16)
HGB BLD-MCNC: 9.7 G/DL (ref 12–16)
HGB BLD-MCNC: 9.7 G/DL (ref 12–16)
HGB BLD-MCNC: 9.9 G/DL (ref 12–16)
LYMPHOCYTES # BLD AUTO: 1.3 THOUSANDS/ΜL (ref 0.6–4.47)
LYMPHOCYTES # BLD AUTO: 1.7 THOUSANDS/ΜL (ref 0.6–4.47)
LYMPHOCYTES # BLD AUTO: 1.8 THOUSANDS/ΜL (ref 0.6–4.47)
LYMPHOCYTES # BLD AUTO: 1.9 THOUSANDS/ΜL (ref 0.6–4.47)
LYMPHOCYTES # BLD AUTO: 2 THOUSANDS/ΜL (ref 0.6–4.47)
LYMPHOCYTES # BLD AUTO: 2.1 THOUSANDS/ΜL (ref 0.6–4.47)
LYMPHOCYTES NFR BLD AUTO: 24 % (ref 21–51)
LYMPHOCYTES NFR BLD AUTO: 27 % (ref 21–51)
LYMPHOCYTES NFR BLD AUTO: 28 % (ref 21–51)
LYMPHOCYTES NFR BLD AUTO: 29 % (ref 21–51)
LYMPHOCYTES NFR BLD AUTO: 31 % (ref 21–51)
LYMPHOCYTES NFR BLD AUTO: 32 % (ref 21–51)
LYMPHOCYTES NFR BLD AUTO: 33 % (ref 21–51)
LYMPHOCYTES NFR BLD AUTO: 34 % (ref 21–51)
MCH RBC QN AUTO: 27.6 PG (ref 26–34)
MCH RBC QN AUTO: 28.2 PG (ref 26–34)
MCH RBC QN AUTO: 28.6 PG (ref 26–34)
MCH RBC QN AUTO: 28.6 PG (ref 26–34)
MCH RBC QN AUTO: 28.7 PG (ref 26–34)
MCH RBC QN AUTO: 28.8 PG (ref 26–34)
MCH RBC QN AUTO: 29 PG (ref 26–34)
MCH RBC QN AUTO: 29.2 PG (ref 26–34)
MCH RBC QN AUTO: 29.5 PG (ref 26–34)
MCH RBC QN AUTO: 29.8 PG (ref 26–34)
MCH RBC QN AUTO: 29.9 PG (ref 26–34)
MCH RBC QN AUTO: 29.9 PG (ref 26–34)
MCHC RBC AUTO-ENTMCNC: 31.7 G/DL (ref 31–37)
MCHC RBC AUTO-ENTMCNC: 32 G/DL (ref 31–37)
MCHC RBC AUTO-ENTMCNC: 32.1 G/DL (ref 31–37)
MCHC RBC AUTO-ENTMCNC: 32.5 G/DL (ref 31–37)
MCHC RBC AUTO-ENTMCNC: 32.9 G/DL (ref 31–37)
MCHC RBC AUTO-ENTMCNC: 32.9 G/DL (ref 31–37)
MCHC RBC AUTO-ENTMCNC: 33 G/DL (ref 31–37)
MCHC RBC AUTO-ENTMCNC: 33.1 G/DL (ref 31–37)
MCHC RBC AUTO-ENTMCNC: 33.1 G/DL (ref 31–37)
MCHC RBC AUTO-ENTMCNC: 33.4 G/DL (ref 31–37)
MCHC RBC AUTO-ENTMCNC: 33.5 G/DL (ref 31–37)
MCHC RBC AUTO-ENTMCNC: 33.5 G/DL (ref 31–37)
MCV RBC AUTO: 87 FL (ref 81–99)
MCV RBC AUTO: 88 FL (ref 81–99)
MCV RBC AUTO: 89 FL (ref 81–99)
MCV RBC AUTO: 89 FL (ref 81–99)
MCV RBC AUTO: 90 FL (ref 81–99)
MCV RBC AUTO: 90 FL (ref 81–99)
MCV RBC AUTO: 93 FL (ref 81–99)
MONOCYTES # BLD AUTO: 0.6 THOUSAND/ΜL (ref 0.17–1.22)
MONOCYTES # BLD AUTO: 0.7 THOUSAND/ΜL (ref 0.17–1.22)
MONOCYTES # BLD AUTO: 0.8 THOUSAND/ΜL (ref 0.17–1.22)
MONOCYTES NFR BLD AUTO: 11 % (ref 2–12)
MONOCYTES NFR BLD AUTO: 11 % (ref 2–12)
MONOCYTES NFR BLD AUTO: 12 % (ref 2–12)
MONOCYTES NFR BLD AUTO: 13 % (ref 2–12)
MONOCYTES NFR BLD AUTO: 13 % (ref 2–12)
MONOCYTES NFR BLD AUTO: 14 % (ref 2–12)
NEUTROPHILS # BLD AUTO: 2.5 THOUSANDS/ΜL (ref 1.4–6.5)
NEUTROPHILS # BLD AUTO: 2.8 THOUSANDS/ΜL (ref 1.4–6.5)
NEUTROPHILS # BLD AUTO: 2.9 THOUSANDS/ΜL (ref 1.4–6.5)
NEUTROPHILS # BLD AUTO: 3 THOUSANDS/ΜL (ref 1.4–6.5)
NEUTROPHILS # BLD AUTO: 3.1 THOUSANDS/ΜL (ref 1.4–6.5)
NEUTROPHILS # BLD AUTO: 3.1 THOUSANDS/ΜL (ref 1.4–6.5)
NEUTROPHILS # BLD AUTO: 3.3 THOUSANDS/ΜL (ref 1.4–6.5)
NEUTROPHILS # BLD AUTO: 3.3 THOUSANDS/ΜL (ref 1.4–6.5)
NEUTROPHILS # BLD AUTO: 3.4 THOUSANDS/ΜL (ref 1.4–6.5)
NEUTROPHILS # BLD AUTO: 3.6 THOUSANDS/ΜL (ref 1.4–6.5)
NEUTROPHILS # BLD AUTO: 3.7 THOUSANDS/ΜL (ref 1.4–6.5)
NEUTROPHILS # BLD AUTO: 4.6 THOUSANDS/ΜL (ref 1.4–6.5)
NEUTS SEG NFR BLD AUTO: 48 % (ref 42–75)
NEUTS SEG NFR BLD AUTO: 49 % (ref 42–75)
NEUTS SEG NFR BLD AUTO: 50 % (ref 42–75)
NEUTS SEG NFR BLD AUTO: 51 % (ref 42–75)
NEUTS SEG NFR BLD AUTO: 52 % (ref 42–75)
NEUTS SEG NFR BLD AUTO: 53 % (ref 42–75)
NEUTS SEG NFR BLD AUTO: 55 % (ref 42–75)
NEUTS SEG NFR BLD AUTO: 56 % (ref 42–75)
NEUTS SEG NFR BLD AUTO: 60 % (ref 42–75)
NEUTS SEG NFR BLD AUTO: 60 % (ref 42–75)
PLATELET # BLD AUTO: 246 THOUSANDS/UL (ref 149–390)
PLATELET # BLD AUTO: 250 THOUSANDS/UL (ref 149–390)
PLATELET # BLD AUTO: 261 THOUSANDS/UL (ref 149–390)
PLATELET # BLD AUTO: 264 THOUSANDS/UL (ref 149–390)
PLATELET # BLD AUTO: 271 THOUSANDS/UL (ref 149–390)
PLATELET # BLD AUTO: 272 THOUSANDS/UL (ref 149–390)
PLATELET # BLD AUTO: 284 THOUSANDS/UL (ref 149–390)
PLATELET # BLD AUTO: 309 THOUSANDS/UL (ref 149–390)
PLATELET # BLD AUTO: 311 THOUSANDS/UL (ref 149–390)
PLATELET # BLD AUTO: 312 THOUSANDS/UL (ref 149–390)
PLATELET # BLD AUTO: 335 THOUSANDS/UL (ref 149–390)
PLATELET # BLD AUTO: 344 THOUSANDS/UL (ref 149–390)
PMV BLD AUTO: 7.7 FL (ref 8.6–11.7)
PMV BLD AUTO: 8.1 FL (ref 8.6–11.7)
PMV BLD AUTO: 8.1 FL (ref 8.6–11.7)
PMV BLD AUTO: 8.2 FL (ref 8.6–11.7)
PMV BLD AUTO: 8.2 FL (ref 8.6–11.7)
PMV BLD AUTO: 8.3 FL (ref 8.6–11.7)
PMV BLD AUTO: 8.4 FL (ref 8.6–11.7)
PMV BLD AUTO: 8.5 FL (ref 8.6–11.7)
PMV BLD AUTO: 8.6 FL (ref 8.6–11.7)
PMV BLD AUTO: 9.2 FL (ref 8.6–11.7)
POTASSIUM SERPL-SCNC: 4 MMOL/L (ref 3.5–5.5)
POTASSIUM SERPL-SCNC: 4.1 MMOL/L (ref 3.5–5.5)
POTASSIUM SERPL-SCNC: 4.2 MMOL/L (ref 3.5–5.5)
POTASSIUM SERPL-SCNC: 4.3 MMOL/L (ref 3.5–5.5)
POTASSIUM SERPL-SCNC: 4.3 MMOL/L (ref 3.5–5.5)
POTASSIUM SERPL-SCNC: 4.4 MMOL/L (ref 3.5–5.5)
POTASSIUM SERPL-SCNC: 4.6 MMOL/L (ref 3.5–5.5)
POTASSIUM SERPL-SCNC: 4.7 MMOL/L (ref 3.5–5.5)
POTASSIUM SERPL-SCNC: 4.7 MMOL/L (ref 3.5–5.5)
RBC # BLD AUTO: 3.19 MILLION/UL (ref 3.9–5.2)
RBC # BLD AUTO: 3.25 MILLION/UL (ref 3.9–5.2)
RBC # BLD AUTO: 3.3 MILLION/UL (ref 3.9–5.2)
RBC # BLD AUTO: 3.31 MILLION/UL (ref 3.9–5.2)
RBC # BLD AUTO: 3.41 MILLION/UL (ref 3.9–5.2)
RBC # BLD AUTO: 3.42 MILLION/UL (ref 3.9–5.2)
RBC # BLD AUTO: 3.44 MILLION/UL (ref 3.9–5.2)
RBC # BLD AUTO: 3.5 MILLION/UL (ref 3.9–5.2)
RBC # BLD AUTO: 3.5 MILLION/UL (ref 3.9–5.2)
RBC # BLD AUTO: 3.52 MILLION/UL (ref 3.9–5.2)
RBC # BLD AUTO: 3.58 MILLION/UL (ref 3.9–5.2)
RBC # BLD AUTO: 3.71 MILLION/UL (ref 3.9–5.2)
SARS-COV-2 RNA RESP QL NAA+PROBE: NEGATIVE
SARS-COV-2 RNA RESP QL NAA+PROBE: POSITIVE
SODIUM SERPL-SCNC: 139 MMOL/L (ref 134–143)
SODIUM SERPL-SCNC: 141 MMOL/L (ref 134–143)
SODIUM SERPL-SCNC: 142 MMOL/L (ref 134–143)
TSH SERPL DL<=0.05 MIU/L-ACNC: 2.72 UIU/ML (ref 0.45–5.33)
TSH SERPL DL<=0.05 MIU/L-ACNC: 2.92 UIU/ML (ref 0.45–5.33)
TSH SERPL DL<=0.05 MIU/L-ACNC: 2.97 UIU/ML (ref 0.45–5.33)
TSH SERPL DL<=0.05 MIU/L-ACNC: 5.71 UIU/ML (ref 0.45–5.33)
WBC # BLD AUTO: 5.2 THOUSAND/UL (ref 4.8–10.8)
WBC # BLD AUTO: 5.5 THOUSAND/UL (ref 4.8–10.8)
WBC # BLD AUTO: 5.7 THOUSAND/UL (ref 4.8–10.8)
WBC # BLD AUTO: 5.7 THOUSAND/UL (ref 4.8–10.8)
WBC # BLD AUTO: 5.8 THOUSAND/UL (ref 4.8–10.8)
WBC # BLD AUTO: 5.8 THOUSAND/UL (ref 4.8–10.8)
WBC # BLD AUTO: 6 THOUSAND/UL (ref 4.8–10.8)
WBC # BLD AUTO: 6.1 THOUSAND/UL (ref 4.8–10.8)
WBC # BLD AUTO: 6.5 THOUSAND/UL (ref 4.8–10.8)
WBC # BLD AUTO: 6.6 THOUSAND/UL (ref 4.8–10.8)
WBC # BLD AUTO: 6.7 THOUSAND/UL (ref 4.8–10.8)
WBC # BLD AUTO: 7.7 THOUSAND/UL (ref 4.8–10.8)

## 2020-01-01 PROCEDURE — 99203 OFFICE O/P NEW LOW 30 MIN: CPT | Performed by: SURGERY

## 2020-01-01 PROCEDURE — 87635 SARS-COV-2 COVID-19 AMP PRB: CPT | Performed by: NURSE PRACTITIONER

## 2020-01-01 PROCEDURE — 85025 COMPLETE CBC W/AUTO DIFF WBC: CPT | Performed by: INTERNAL MEDICINE

## 2020-01-01 PROCEDURE — 93925 LOWER EXTREMITY STUDY: CPT

## 2020-01-01 PROCEDURE — 87635 SARS-COV-2 COVID-19 AMP PRB: CPT | Performed by: INTERNAL MEDICINE

## 2020-01-01 PROCEDURE — 93923 UPR/LXTR ART STDY 3+ LVLS: CPT

## 2020-01-01 PROCEDURE — 84443 ASSAY THYROID STIM HORMONE: CPT | Performed by: INTERNAL MEDICINE

## 2020-01-01 PROCEDURE — 80048 BASIC METABOLIC PNL TOTAL CA: CPT | Performed by: INTERNAL MEDICINE

## 2020-01-01 PROCEDURE — 85025 COMPLETE CBC W/AUTO DIFF WBC: CPT | Performed by: NURSE PRACTITIONER

## 2020-01-01 PROCEDURE — 93922 UPR/L XTREMITY ART 2 LEVELS: CPT | Performed by: SURGERY

## 2020-01-01 PROCEDURE — 87070 CULTURE OTHR SPECIMN AEROBIC: CPT | Performed by: INTERNAL MEDICINE

## 2020-01-01 PROCEDURE — 0HBRXZZ EXCISION OF TOE NAIL, EXTERNAL APPROACH: ICD-10-PCS | Performed by: PODIATRIST

## 2020-01-01 PROCEDURE — 82948 REAGENT STRIP/BLOOD GLUCOSE: CPT

## 2020-01-01 PROCEDURE — 87205 SMEAR GRAM STAIN: CPT | Performed by: INTERNAL MEDICINE

## 2020-01-01 PROCEDURE — 93925 LOWER EXTREMITY STUDY: CPT | Performed by: SURGERY

## 2020-01-01 PROCEDURE — 87077 CULTURE AEROBIC IDENTIFY: CPT | Performed by: INTERNAL MEDICINE

## 2020-01-01 PROCEDURE — 73630 X-RAY EXAM OF FOOT: CPT

## 2020-01-01 PROCEDURE — 87186 SC STD MICRODIL/AGAR DIL: CPT | Performed by: INTERNAL MEDICINE

## 2020-01-01 PROCEDURE — 99441 PR PHYS/QHP TELEPHONE EVALUATION 5-10 MIN: CPT | Performed by: SURGERY

## 2020-04-02 PROBLEM — I77.9 PAOD (PERIPHERAL ARTERIAL OCCLUSIVE DISEASE) (HCC): Status: ACTIVE | Noted: 2020-01-01

## 2020-04-02 PROBLEM — S91.109A OPEN TOE WOUND: Status: ACTIVE | Noted: 2020-01-01

## 2020-07-27 NOTE — PROGRESS NOTES
Virtual Brief Visit    Assessment/Plan:    Problem List Items Addressed This Visit        Cardiovascular and Mediastinum    PAOD (peripheral arterial occlusive disease) ([facility] [address] ) - Primary     Today's office encounter was via virtual telephone visit as facility did not have any video capability  This was in follow-up from prior office visit in April at which time Eugenia Fernandes had presented with a nonhealing left 2nd toe wound  The wound persists  The wound is reportedly dry  She denies any fevers or chills  Her recent arterial duplex in July does suggest bilateral SFA occlusions with an MATTY of 1 6 to on the right and 1 14 on the left  I have offered Eugenia Fernandes a left lower extremity arteriogram with possible intervention  Patient was quite explicit that she would not like any invasive measures  As such I instructed the facility to notify our office should there be any deterioration/changes to her current wounds  She may follow up on an as-needed basis  Reason for visit is   Chief Complaint   Patient presents with    Virtual Brief Visit        Encounter provider Nicolas Kuhn DO    Provider located at [address] [place] [id number]    Recent Visits  No visits were found meeting these conditions  Showing recent visits within past 7 days and meeting all other requirements     Today's Visits  Date Type Provider Dept   07/27/20 Telemedicine Shelly Christy DO [facility]   Showing today's visits and meeting all other requirements     Future Appointments  No visits were found meeting these conditions  Showing future appointments within next 150 days and meeting all other requirements        After connecting through telephone, the patient was identified by name and date of birth   Pamsy Dailey was informed that this is a telemedicine visit and that the visit is being conducted through telephone  My office door was closed  No one else was in the room  She acknowledged consent and understanding of privacy and security of the platform  The patient has agreed to participate and understands she can discontinue the visit at any time  Patient is aware this is a billable service  It was my intent to perform this visit via video technology but the patient was not able to do a video connection so the visit was completed via audio telephone only  Subjective    Gordo Leonardr is a 80 y o  female   Guadalupe Barney is an 78-year-old woman with multiple comorbidities with a nonhealing left 2nd toe wound  She was seen back in April at which time local wound care was recommended  This is a follow-up telephone visit  The wound persist   The wounds reportedly dry and scabbed"  Patient was offered a left lower extremity arteriogram with possible intervention however she is not interested in pursuing any intervention at this time  Past Medical History:   Diagnosis Date    Arthritis     CHF (congestive heart failure) (HCC)     CKD (chronic [facility]) stage 3, [facility] 30[facility] 8/25/2018    Disease of thyroid gland     Hypertension     MI, old     Pressure injury of skin     TIA (transient ischemic attack)        Past Surgical History:   Procedure Laterality Date    ANKLE FRACTURE SURGERY      APPENDECTOMY      CAROTID ARTERY - SUBCLAVIAN ARTERY BYPASS GRAFT      HYSTERECTOMY      TONSILLECTOMY         Current Outpatient Medications   Medication Sig Dispense Refill    metoprolol tartrate (LOPRESSOR) 25 mg tablet Take 1 tablet (25 mg total) by mouth every 12 (twelve) hours  0     No current facility-administered medications for this visit           Allergies   Allergen Reactions    Esomeprazole     Gatifloxacin     Influenza Virus Vac Live Quad     Lidocaine     Morphine     Naproxen     Penicillins      Unknown reaction    Silver Sulfadiazine     Sulfamethoxazole  Trimethoprim     Tylenol [Acetaminophen] Dizziness     spinning sensation        Review of Systems   Constitutional: Positive for activity change  HENT: Negative  Eyes: Positive for visual disturbance  Respiratory: Negative  Cardiovascular: Negative  Gastrointestinal: Negative  Endocrine: Negative  Genitourinary: Negative  Musculoskeletal: Positive for arthralgias, back pain and gait problem  Skin: Positive for wound  Allergic/Immunologic: Negative  Hematological: Negative  Psychiatric/Behavioral: Negative  There were no vitals filed for this visit  I spent 10 minutes directly with the patient via telephone during this visit    [address] [employer] [facility] acknowledges that she has consented to an online visit or consultation  She understands that the online visit is based solely on information provided by her, and that, in the absence of a face-to-face physical evaluation by the physician, the diagnosis she receives is both limited and provisional in terms of accuracy and completeness  This is not intended to replace a full medical face-to-face evaluation by the physician  Yasmine Schofield understands and accepts these terms

## 2020-07-27 NOTE — ASSESSMENT & PLAN NOTE
Today's office encounter was via virtual telephone visit as facility did not have any video capability  This was in follow-up from prior office visit in April at which time Luke Arenas had presented with a nonhealing left 2nd toe wound  The wound persists  The wound is reportedly dry  She denies any fevers or chills  Her recent arterial duplex in July does suggest bilateral SFA occlusions with an MATTY of 1 6 to on the right and 1 14 on the left  I have offered Luke Arenas a left lower extremity arteriogram with possible intervention  Patient was quite explicit that she would not like any invasive measures  As such I instructed the facility to notify our office should there be any deterioration/changes to her current wounds  She may follow up on an as-needed basis

## 2023-01-10 NOTE — PLAN OF CARE
Problem: Potential for Falls  Goal: Patient will remain free of falls  Description  INTERVENTIONS:  - Assess patient frequently for physical needs  -  Identify cognitive and physical deficits and behaviors that affect risk of falls  -  [place] fall precautions as indicated by assessment   - Educate patient/family on patient safety including physical limitations  - Instruct patient to call for assistance with activity based on assessment  - Modify environment to reduce risk of injury  - Consider OT/PT consult to assist with strengthening/mobility  Outcome: Progressing     Problem: Prexisting or High Potential for Compromised Skin Integrity  Goal: Skin integrity is maintained or improved  Description  INTERVENTIONS:  - Identify patients at risk for skin breakdown  - Assess and monitor skin integrity  - Assess and monitor nutrition and hydration status  - Monitor labs (i e  albumin)  - Assess for incontinence   - Turn and reposition patient  - Assist with mobility/ambulation  - Relieve pressure over bony prominences  - Avoid friction and shearing  - Provide appropriate hygiene as needed including keeping skin clean and dry  - Evaluate need for skin moisturizer/barrier cream  - Collaborate with interdisciplinary team (i e  Nutrition, Rehabilitation, etc )   - Patient/family teaching  Outcome: Progressing     Problem: Nutrition/Hydration-ADULT  Goal: Nutrient/Hydration intake appropriate for improving, restoring or maintaining nutritional needs  Description  Monitor and assess patient's nutrition/hydration status for malnutrition (ex- brittle hair, bruises, dry skin, pale skin and conjunctiva, muscle wasting, smooth red tongue, and disorientation)  Collaborate with interdisciplinary team and initiate plan and interventions as ordered  Monitor patient's weight and dietary intake as ordered or per policy  Utilize nutrition screening tool and intervene per policy   Determine patient's food preferences and provide high-protein, high-caloric foods as appropriate  INTERVENTIONS:  - Monitor oral intake, urinary output, labs, and treatment plans  - Assess nutrition and hydration status and recommend course of action  - Evaluate amount of meals eaten  - Assist patient with eating if necessary   - Allow adequate time for meals  - Recommend/ encourage appropriate diets, oral nutritional supplements, and vitamin/mineral supplements  - Order, calculate, and assess calorie counts as needed  - Recommend, monitor, and adjust tube feedings and TPN/PPN based on assessed needs  - Assess need for intravenous fluids  - Provide specific nutrition/hydration education as appropriate  - Include patient/family/caregiver in decisions related to nutrition      7-29-19      she is on a surgical full liquid  RDN visited on rounds this am and she was taking some of her tea and oatmeal  She stated that she did feel better  Her weight was 144 on 7-28 then 149 on 7-29 with edema noted  Her skin is intact  Her glucose was 89 WNL 7-29  RDN to evaluate her labs when available  She is on zofran and lopressor  RDN to reassess her nutrition needs with her and her team PRN at high risk  RDN to recommend to increase to solids as tolerated       Outcome: Progressing     Problem: PAIN - ADULT  Goal: Verbalizes/displays adequate comfort level or baseline comfort level  Description  Interventions:  - Encourage patient to monitor pain and request assistance  - Assess pain using appropriate pain scale  - Administer analgesics based on type and severity of pain and evaluate response  - Implement non-pharmacological measures as appropriate and evaluate response  - Consider cultural and social influences on pain and pain management  - Notify physician/advanced practitioner if interventions unsuccessful or patient reports new pain  Outcome: Progressing     Problem: INFECTION - ADULT  Goal: Absence or prevention of progression during hospitalization  Description  INTERVENTIONS:  - Assess and monitor for signs and symptoms of infection  - Monitor lab/diagnostic results  - Monitor all insertion sites, i e  indwelling lines, tubes, and drains  - Administer medications as ordered  - Instruct and encourage patient and family to use good hand hygiene technique  - Identify and instruct in appropriate isolation precautions for identified infection/condition   Outcome: Progressing  Goal: Absence of fever/infection during neutropenic period  Description  INTERVENTIONS:  - Monitor WBC     Outcome: Progressing     Problem: SAFETY ADULT  Goal: Patient will remain free of falls  Description  INTERVENTIONS:  - Assess patient frequently for physical needs  -  Identify cognitive and physical deficits and behaviors that affect risk of falls    -  [place] fall precautions as indicated by assessment   - Educate patient/family on patient safety including physical limitations  - Instruct patient to call for assistance with activity based on assessment  - Modify environment to reduce risk of injury  - Consider OT/PT consult to assist with strengthening/mobility  Outcome: Progressing  Goal: Maintain or return to baseline ADL function  Description  INTERVENTIONS:  -  Assess patient's ability to carry out ADLs; assess patient's baseline for ADL function and identify physical deficits which impact ability to perform ADLs (bathing, care of mouth/teeth, toileting, grooming, dressing, etc )  - Assess/evaluate cause of self-care deficits   - Assess range of motion  - Assess patient's mobility; develop plan if impaired  - Assess patient's need for assistive devices and provide as appropriate  - Encourage maximum independence but intervene and supervise when necessary  ¯ Involve family in performance of ADLs  ¯ Assess for home care needs following discharge   ¯ Request OT consult to assist with ADL evaluation and planning for discharge  ¯ Provide patient education as appropriate  Outcome: Progressing  Goal: Maintain or return mobility status to optimal level  Description  INTERVENTIONS:  - Assess patient's baseline mobility status (ambulation, transfers, stairs, etc )    - Identify cognitive and physical deficits and behaviors that affect mobility  - Identify mobility aids required to assist with transfers and/or ambulation (gait belt, sit-to-stand, lift, walker, cane, etc )  - [place] fall precautions as indicated by assessment  - Record patient progress and toleration of activity level on Mobility SBAR; progress patient to next Phase/Stage  - Instruct patient to call for assistance with activity based on assessment  - Request Rehabilitation consult to assist with strengthening/weightbearing, etc   Outcome: Progressing     Problem: DISCHARGE PLANNING  Goal: Discharge to home or other facility with appropriate resources  Description  INTERVENTIONS:  - Identify barriers to discharge w/patient and caregiver  - Arrange for needed discharge resources and transportation as appropriate  - Identify discharge learning needs (meds, wound care, etc )  - Arrange for interpretive services to assist at discharge as needed  - Refer to Case Management Department for coordinating discharge planning if the patient needs post-hospital services based on physician/advanced practitioner order or complex needs related to functional status, cognitive ability, or social support system  Outcome: Progressing     Problem: Knowledge Deficit  Goal: Patient/family/caregiver demonstrates understanding of disease process, treatment plan, medications, and discharge instructions  Description  Complete learning assessment and assess knowledge base    Interventions:  - Provide teaching at level of understanding  - Provide teaching via preferred learning methods  Outcome: Progressing     Problem: GASTROINTESTINAL - ADULT  Goal: Minimal or absence of nausea and/or vomiting  Description  INTERVENTIONS:  - Administer IV fluids as ordered to ensure adequate hydration  - Maintain NPO status until nausea and vomiting are resolved  - Nasogastric tube as ordered  - Administer ordered antiemetic medications as needed  - Provide nonpharmacologic comfort measures as appropriate  - Advance diet as tolerated, if ordered  - Nutrition services referral to assist patient with adequate nutrition and appropriate food choices  Outcome: Progressing  Goal: Maintains adequate nutritional intake  Description  INTERVENTIONS:  - Monitor percentage of each meal consumed  - Identify factors contributing to decreased intake, treat as appropriate  - Assist with meals as needed  - Monitor I&O, WT and lab values  - Obtain nutrition services referral as needed  Outcome: Progressing     Problem: METABOLIC, FLUID AND ELECTROLYTES - ADULT  Goal: Electrolytes maintained within normal limits  Description  INTERVENTIONS:  - Monitor labs and assess patient for signs and symptoms of electrolyte imbalances  - Administer electrolyte replacement as ordered  - Monitor response to electrolyte replacements, including repeat lab results as appropriate  - Instruct patient on fluid and nutrition as appropriate  Outcome: Progressing  Goal: Fluid balance maintained  Description  INTERVENTIONS:  - Monitor labs and assess for signs and symptoms of volume excess or deficit  - Monitor I/O and WT  - Instruct patient on fluid and nutrition as appropriate  Outcome: Progressing     Problem: SKIN/TISSUE INTEGRITY - ADULT  Goal: Skin integrity remains intact  Description  INTERVENTIONS  - Identify patients at risk for skin breakdown  - Assess and monitor skin integrity  - Assess and monitor nutrition and hydration status  - Monitor labs (i e  albumin)  - Assess for incontinence   - Turn and reposition patient  - Assist with mobility/ambulation  - Relieve pressure over bony prominences  - Avoid friction and shearing  - Provide appropriate hygiene as needed including keeping skin clean and dry  - Evaluate need for skin moisturizer/barrier cream  - Collaborate with interdisciplinary team (i e  Nutrition, Rehabilitation, etc )   - Patient/family teaching  Outcome: Progressing Yes

## 2023-08-09 NOTE — PLAN OF CARE
Problem: Knowledge Deficit  Goal: Patient/family/caregiver demonstrates understanding of disease process, treatment plan, medications, and discharge instructions  Complete learning assessment and assess knowledge base    Interventions:  - Provide teaching at level of understanding  - Provide teaching via preferred learning methods   Outcome: Progressing  Being  Explained  All  Treatment  And  procedures Provider (A): Dr. Carrero